# Patient Record
Sex: FEMALE | Race: WHITE | Employment: FULL TIME | URBAN - METROPOLITAN AREA
[De-identification: names, ages, dates, MRNs, and addresses within clinical notes are randomized per-mention and may not be internally consistent; named-entity substitution may affect disease eponyms.]

---

## 2017-01-10 DIAGNOSIS — K21.9 GASTROESOPHAGEAL REFLUX DISEASE, ESOPHAGITIS PRESENCE NOT SPECIFIED: ICD-10-CM

## 2017-01-15 RX ORDER — PANTOPRAZOLE SODIUM 40 MG/1
40 TABLET, DELAYED RELEASE ORAL
Qty: 90 TABLET | Refills: 0 | Status: SHIPPED | OUTPATIENT
Start: 2017-01-15 | End: 2017-01-15

## 2017-01-15 RX ORDER — ROSUVASTATIN CALCIUM 5 MG/1
5 TABLET, COATED ORAL NIGHTLY
Qty: 90 TABLET | Refills: 0 | Status: SHIPPED | OUTPATIENT
Start: 2017-01-15 | End: 2017-01-15

## 2017-01-15 RX ORDER — LEVOTHYROXINE SODIUM 112 UG/1
112 TABLET ORAL
Qty: 90 TABLET | Refills: 0 | Status: SHIPPED | OUTPATIENT
Start: 2017-01-15 | End: 2017-01-15

## 2017-01-16 NOTE — TELEPHONE ENCOUNTER
From: Naresh Plan  To: Payton Peter MD  Sent: 1/10/2017 10:21 PM CST  Subject: Medication Renewal Request    Original authorizing provider: MD Narehs Vega would like a refill of the following medications:  Flutica

## 2017-01-16 NOTE — TELEPHONE ENCOUNTER
Hypothyroid Medications: Medication refilled for 90 days per protocol.     Protocol Criteria:  Appointment scheduled in the past 12 months or the next 3 months  TSH resulted in the past 12 months that is normal  Recent Visits       Provider Department [facility]

## 2017-01-16 NOTE — TELEPHONE ENCOUNTER
Cholesterol Medications/Allergy Medications/GI medications: Medication refilled for 90 days per protocol.     Protocol Criteria:  · Appointment scheduled in the past 12 months or in the next 3 months  · ALT & LDL on file in the past 12 months  · ALT result

## 2017-02-11 ENCOUNTER — HOSPITAL ENCOUNTER (OUTPATIENT)
Age: 57
Discharge: HOME | End: 2017-02-11
Attending: EMERGENCY MEDICINE
Payer: COMMERCIAL

## 2017-02-11 VITALS
SYSTOLIC BLOOD PRESSURE: 124 MMHG | DIASTOLIC BLOOD PRESSURE: 73 MMHG | HEART RATE: 87 BPM | BODY MASS INDEX: 29.99 KG/M2 | TEMPERATURE: 98 F | HEIGHT: 65 IN | RESPIRATION RATE: 18 BRPM | WEIGHT: 180 LBS

## 2017-02-11 DIAGNOSIS — H10.33 ACUTE CONJUNCTIVITIS OF BOTH EYES, UNSPECIFIED ACUTE CONJUNCTIVITIS TYPE: Primary | ICD-10-CM

## 2017-02-11 PROCEDURE — 99204 OFFICE O/P NEW MOD 45 MIN: CPT

## 2017-02-11 PROCEDURE — 99213 OFFICE O/P EST LOW 20 MIN: CPT

## 2017-02-11 RX ORDER — TOBRAMYCIN 3 MG/ML
2 SOLUTION/ DROPS OPHTHALMIC EVERY 6 HOURS
Qty: 1 BOTTLE | Refills: 0 | Status: SHIPPED | OUTPATIENT
Start: 2017-02-11 | End: 2017-02-16

## 2017-02-11 NOTE — ED PROVIDER NOTES
[facility]    History   Patient presents with:   Eye Visual Problem (opthalmic)    Stated Complaint: EYE REDNESS    HPI    The patient is a 58-year-old female with no significant past medical history prese • Cancer Paternal Grandmother      breast   • Lipids Brother          Smoking Status: Never Smoker                      Alcohol Use: No                 Comment:        Review of Systems    Positive for stated complaint: EYE REDNESS  Other systems are as days  If symptoms worsen      Medications Prescribed:  Current Discharge Medication List    START taking these medications    Tobramycin Sulfate 0.3 % Ophthalmic Solution  Place 2 drops into both eyes every 6 (six) hours.   Qty: 1 Bottle Refills: 0

## 2017-03-01 ENCOUNTER — OFFICE VISIT (OUTPATIENT)
Dept: INTERNAL MEDICINE CLINIC | Facility: CLINIC | Age: 57
End: 2017-03-01

## 2017-03-01 VITALS
WEIGHT: 178 LBS | HEIGHT: 64 IN | SYSTOLIC BLOOD PRESSURE: 115 MMHG | BODY MASS INDEX: 30.39 KG/M2 | DIASTOLIC BLOOD PRESSURE: 72 MMHG | TEMPERATURE: 98 F | HEART RATE: 71 BPM

## 2017-03-01 DIAGNOSIS — H04.203 EYE TEARING, BILATERAL: Primary | ICD-10-CM

## 2017-03-01 PROCEDURE — 99212 OFFICE O/P EST SF 10 MIN: CPT | Performed by: INTERNAL MEDICINE

## 2017-03-01 NOTE — PROGRESS NOTES
Nia Sprague is a 60year old female. Patient presents with:  Eye Problem    HPI:   For the past 3-4 days, she has noticed excessive tearing with occasional itchiness in both eyes. No purulent drainage. No redness.   She does not wear contact lenses pink, no conjunctival or scleral redness or injection, and no discharge evident    ASSESSMENT AND PLAN:   1. Eye tearing, bilateral  Doubt conjunctivitis. Reassure and observe. Call if no better.     The patient indicates understanding of these issues and

## 2017-04-05 ENCOUNTER — PATIENT MESSAGE (OUTPATIENT)
Dept: INTERNAL MEDICINE CLINIC | Facility: CLINIC | Age: 57
End: 2017-04-05

## 2017-04-05 DIAGNOSIS — Z00.00 PHYSICAL EXAM: Primary | ICD-10-CM

## 2017-04-05 DIAGNOSIS — Z12.31 SCREENING MAMMOGRAM, ENCOUNTER FOR: ICD-10-CM

## 2017-04-05 NOTE — TELEPHONE ENCOUNTER
From: Terrell Anand  To: Billy Granados MD  Sent: 4/5/2017 11:24 AM CDT  Subject: Other    I have scheduled my yearly regular exam for 4/20/17 to see you. I just want to ask if there is any blood work or testing I should complete before I come.  (

## 2017-04-20 ENCOUNTER — OFFICE VISIT (OUTPATIENT)
Dept: INTERNAL MEDICINE CLINIC | Facility: CLINIC | Age: 57
End: 2017-04-20

## 2017-04-20 VITALS
HEART RATE: 76 BPM | SYSTOLIC BLOOD PRESSURE: 130 MMHG | BODY MASS INDEX: 30.16 KG/M2 | WEIGHT: 181 LBS | HEIGHT: 65 IN | DIASTOLIC BLOOD PRESSURE: 78 MMHG

## 2017-04-20 DIAGNOSIS — E06.3 HYPOTHYROIDISM DUE TO HASHIMOTO'S THYROIDITIS: ICD-10-CM

## 2017-04-20 DIAGNOSIS — E04.9 GOITER: ICD-10-CM

## 2017-04-20 DIAGNOSIS — E78.00 PURE HYPERCHOLESTEROLEMIA: ICD-10-CM

## 2017-04-20 DIAGNOSIS — E03.8 HYPOTHYROIDISM DUE TO HASHIMOTO'S THYROIDITIS: ICD-10-CM

## 2017-04-20 DIAGNOSIS — L98.9 SKIN LESIONS: ICD-10-CM

## 2017-04-20 DIAGNOSIS — K21.9 GASTROESOPHAGEAL REFLUX DISEASE, ESOPHAGITIS PRESENCE NOT SPECIFIED: Primary | ICD-10-CM

## 2017-04-20 DIAGNOSIS — M85.80 OSTEOPENIA, UNSPECIFIED LOCATION: ICD-10-CM

## 2017-04-20 DIAGNOSIS — K21.9 GASTROESOPHAGEAL REFLUX DISEASE WITHOUT ESOPHAGITIS: ICD-10-CM

## 2017-04-20 DIAGNOSIS — N63.25 BREAST LUMP ON LEFT SIDE AT 9 O'CLOCK POSITION: ICD-10-CM

## 2017-04-20 PROCEDURE — 99213 OFFICE O/P EST LOW 20 MIN: CPT | Performed by: INTERNAL MEDICINE

## 2017-04-20 PROCEDURE — 99396 PREV VISIT EST AGE 40-64: CPT | Performed by: INTERNAL MEDICINE

## 2017-04-20 RX ORDER — ROSUVASTATIN CALCIUM 5 MG/1
5 TABLET, COATED ORAL NIGHTLY
Qty: 90 TABLET | Refills: 0 | Status: SHIPPED | OUTPATIENT
Start: 2017-04-20 | End: 2017-04-26

## 2017-04-20 RX ORDER — LEVOTHYROXINE SODIUM 112 UG/1
112 TABLET ORAL
Qty: 90 TABLET | Refills: 0 | Status: SHIPPED | OUTPATIENT
Start: 2017-04-20 | End: 2017-04-26

## 2017-04-20 RX ORDER — PANTOPRAZOLE SODIUM 40 MG/1
40 TABLET, DELAYED RELEASE ORAL
Qty: 90 TABLET | Refills: 0 | Status: SHIPPED | OUTPATIENT
Start: 2017-04-20 | End: 2017-04-26

## 2017-04-20 NOTE — PROGRESS NOTES
HPI:    Patient ID: Keshav Fox is a 60year old female.     HPI Comments: Patient patient presents today for physical exam, states doing well otherwise, denies chest pain, shortness of breath, dyspnea on exertion or heart palpitations also denies na Fluticasone Propionate HFA (FLOVENT HFA) 110 MCG/ACT Inhalation Aerosol Inhale 2 puffs into the lungs 2 (two) times daily.  Disp: 3 Inhaler Rfl: 0   Albuterol Sulfate (2.5 MG/3ML) 0.083% Inhalation Nebu Soln Take 3 mL (2.5 mg total) by nebulization daily as Small  Movable  Under  The  Skin  Cystic  Type  Lesion  - mid  of  Chest   More to   To  Left  Side -to  Breast  Area     Otherwise No Breast mass - bilaterally    Abdominal: Soft. She exhibits no mass. There is no hepatosplenomegaly.  There is no tendernes Dexa  Scan pt  Education   Jae  Vit   D   Bid     Skin lesions  Refer to derm   Mositurizing   Lotions       Physical  Exam   Maintain a healthy low fat and sugar diet  balanced diet   Maintain a regular cardiovascular exercise /walking as tolerated   Comp

## 2017-04-21 ENCOUNTER — TELEPHONE (OUTPATIENT)
Dept: INTERNAL MEDICINE CLINIC | Facility: CLINIC | Age: 57
End: 2017-04-21

## 2017-04-21 DIAGNOSIS — K21.9 GASTROESOPHAGEAL REFLUX DISEASE, ESOPHAGITIS PRESENCE NOT SPECIFIED: Primary | ICD-10-CM

## 2017-04-21 NOTE — TELEPHONE ENCOUNTER
PLEASE SEND TO [facility] PLEASE RE DO SHE IS ALMOST OUT OF MEDICATION    Current Outpatient Prescriptions:  Rosuvastatin Calcium (CRESTOR) 5 MG Oral Tab Take 1 tablet (5 mg total) by mouth nightly.  Disp: 90 tablet Rfl: 0   Pantoprazole Sodium (PRO

## 2017-04-25 ENCOUNTER — PATIENT MESSAGE (OUTPATIENT)
Dept: INTERNAL MEDICINE CLINIC | Facility: CLINIC | Age: 57
End: 2017-04-25

## 2017-04-25 DIAGNOSIS — K21.9 GASTROESOPHAGEAL REFLUX DISEASE, ESOPHAGITIS PRESENCE NOT SPECIFIED: ICD-10-CM

## 2017-04-26 RX ORDER — LEVOTHYROXINE SODIUM 112 UG/1
112 TABLET ORAL
Qty: 90 TABLET | Refills: 1 | Status: SHIPPED | OUTPATIENT
Start: 2017-04-26 | End: 2017-07-17

## 2017-04-26 RX ORDER — ROSUVASTATIN CALCIUM 5 MG/1
5 TABLET, COATED ORAL NIGHTLY
Qty: 90 TABLET | Refills: 1 | Status: SHIPPED | OUTPATIENT
Start: 2017-04-26 | End: 2017-10-26

## 2017-04-26 RX ORDER — PANTOPRAZOLE SODIUM 40 MG/1
40 TABLET, DELAYED RELEASE ORAL
Qty: 90 TABLET | Refills: 1 | Status: SHIPPED | OUTPATIENT
Start: 2017-04-26 | End: 2017-07-17

## 2017-04-26 NOTE — TELEPHONE ENCOUNTER
Pt presented to the [facility] location stating that the meds was sent to the wrong pharmacy. Meds was cancelled at [facility] and sent to [facility]. Understanding was voiced.

## 2017-04-27 NOTE — TELEPHONE ENCOUNTER
From: Mel Garcia  To: Yovany Dominique MD  Sent: 4/25/2017 9:13 PM CDT  Subject: Other    I am upset!!!! I was to see Dr Saad Cooper on 4/20/17. I requested 3 prescription refills to be sent to [facility] immediately as I am almost out.

## 2017-04-28 NOTE — TELEPHONE ENCOUNTER
From: Bharath [place]  To: Laci Arvizu MD  Sent: 4/25/2017 9:21 PM CDT  Subject: Other    This is second message tonight relating to the unsent RX refill.  Please call my cell phone [phone] tomorrow (4/26/17) to let me know if you were able

## 2017-05-01 RX ORDER — PANTOPRAZOLE SODIUM 40 MG/1
40 TABLET, DELAYED RELEASE ORAL
Qty: 90 TABLET | Refills: 0 | Status: CANCELLED | OUTPATIENT
Start: 2017-05-01

## 2017-05-01 RX ORDER — ROSUVASTATIN CALCIUM 5 MG/1
5 TABLET, COATED ORAL NIGHTLY
Qty: 90 TABLET | Refills: 0 | Status: CANCELLED | OUTPATIENT
Start: 2017-05-01 | End: 2018-05-01

## 2017-05-01 RX ORDER — LEVOTHYROXINE SODIUM 112 UG/1
112 TABLET ORAL
Qty: 90 TABLET | Refills: 0 | Status: CANCELLED | OUTPATIENT
Start: 2017-05-01

## 2017-05-01 NOTE — TELEPHONE ENCOUNTER
From: Clara Dooley  To: Josh Vu MD  Sent: 4/25/2017 9:02 PM CDT  Subject: Medication Renewal Request    Original authorizing provider: MD Clara Hearn would like a refill of the following medications:  Rosuvast

## 2017-05-08 ENCOUNTER — HOSPITAL ENCOUNTER (OUTPATIENT)
Dept: ULTRASOUND IMAGING | Age: 57
Discharge: HOME | End: 2017-05-08
Attending: INTERNAL MEDICINE
Payer: COMMERCIAL

## 2017-05-08 ENCOUNTER — HOSPITAL ENCOUNTER (OUTPATIENT)
Dept: MAMMOGRAPHY | Age: 57
Discharge: HOME | End: 2017-05-08
Attending: INTERNAL MEDICINE
Payer: COMMERCIAL

## 2017-05-08 ENCOUNTER — HOSPITAL ENCOUNTER (OUTPATIENT)
Dept: BONE DENSITY | Age: 57
Discharge: HOME | End: 2017-05-08
Attending: INTERNAL MEDICINE
Payer: COMMERCIAL

## 2017-05-08 DIAGNOSIS — M85.80 OSTEOPENIA, UNSPECIFIED LOCATION: ICD-10-CM

## 2017-05-08 DIAGNOSIS — E06.3 HYPOTHYROIDISM DUE TO HASHIMOTO'S THYROIDITIS: ICD-10-CM

## 2017-05-08 DIAGNOSIS — E03.8 HYPOTHYROIDISM DUE TO HASHIMOTO'S THYROIDITIS: ICD-10-CM

## 2017-05-08 DIAGNOSIS — E04.9 GOITER: ICD-10-CM

## 2017-05-08 DIAGNOSIS — Z12.31 SCREENING MAMMOGRAM, ENCOUNTER FOR: ICD-10-CM

## 2017-05-08 PROCEDURE — 76536 US EXAM OF HEAD AND NECK: CPT | Performed by: INTERNAL MEDICINE

## 2017-05-08 PROCEDURE — 77067 SCR MAMMO BI INCL CAD: CPT | Performed by: INTERNAL MEDICINE

## 2017-05-08 PROCEDURE — 77080 DXA BONE DENSITY AXIAL: CPT | Performed by: INTERNAL MEDICINE

## 2017-05-15 ENCOUNTER — OFFICE VISIT (OUTPATIENT)
Dept: DERMATOLOGY CLINIC | Facility: CLINIC | Age: 57
End: 2017-05-15

## 2017-05-15 DIAGNOSIS — D23.5 BENIGN NEOPLASM OF SKIN OF TRUNK, EXCEPT SCROTUM: ICD-10-CM

## 2017-05-15 DIAGNOSIS — D23.60 BENIGN NEOPLASM OF SKIN OF UPPER LIMB, INCLUDING SHOULDER, UNSPECIFIED LATERALITY: ICD-10-CM

## 2017-05-15 DIAGNOSIS — D23.30 BENIGN NEOPLASM OF SKIN OF FACE: ICD-10-CM

## 2017-05-15 DIAGNOSIS — D23.4 BENIGN NEOPLASM OF SCALP AND SKIN OF NECK: ICD-10-CM

## 2017-05-15 DIAGNOSIS — D22.9 MULTIPLE NEVI: Primary | ICD-10-CM

## 2017-05-15 PROCEDURE — 99213 OFFICE O/P EST LOW 20 MIN: CPT | Performed by: DERMATOLOGY

## 2017-05-15 PROCEDURE — 99212 OFFICE O/P EST SF 10 MIN: CPT | Performed by: DERMATOLOGY

## 2017-05-20 ENCOUNTER — LAB ENCOUNTER (OUTPATIENT)
Dept: LAB | Facility: HOSPITAL | Age: 57
End: 2017-05-20
Attending: INTERNAL MEDICINE
Payer: COMMERCIAL

## 2017-05-20 DIAGNOSIS — Z12.31 SCREENING MAMMOGRAM, ENCOUNTER FOR: ICD-10-CM

## 2017-05-20 DIAGNOSIS — Z00.00 PHYSICAL EXAM: ICD-10-CM

## 2017-05-20 PROCEDURE — 85025 COMPLETE CBC W/AUTO DIFF WBC: CPT

## 2017-05-20 PROCEDURE — 36415 COLL VENOUS BLD VENIPUNCTURE: CPT

## 2017-05-20 PROCEDURE — 80053 COMPREHEN METABOLIC PANEL: CPT

## 2017-05-20 PROCEDURE — 81015 MICROSCOPIC EXAM OF URINE: CPT

## 2017-05-20 PROCEDURE — 80061 LIPID PANEL: CPT

## 2017-05-20 PROCEDURE — 84443 ASSAY THYROID STIM HORMONE: CPT

## 2017-05-22 NOTE — PROGRESS NOTES
Quick Note:    Please call patient with blood test results. Kidney and liver function are normal, no anemia. Cholesterol is MILDLY ELEVATED   sugar is MILDLY ELEVATED   Thyroid hormone is normal as well. Urine is clear.      ADVISE TO CONTINUE SAME M

## 2017-05-30 NOTE — PROGRESS NOTES
Becca Farooq is a 60year old female. HPI:     CC:  Patient presents with:  Full Skin Exam: LOV 5/22/2014 with Dr. Lorena Baxter. Pt presenting for full body skin exam. Pt denies personal and family hx of skin cancer.         Allergies:  Review of patient No Known Allergies    Past Medical History   Diagnosis Date   • Hypercholesterolemia    • Hypothyroid    • Mitral valve prolapse    • Lump, breast          Past Surgical History          Comment x2    COLONOSCOPY      T&A      OTHER SURGICAL HISTORY Examination:     Well-developed well-nourished patient alert oriented in no acute distress.   Exam total-body performed, including scalp, head, neck, face,nails, hair, external eyes, including conjunctival mucosa, eyelids, lips external ears, back, chest,/ seborrheic  keratoses, cherry angiomas:  Reassurance regarding other benign skin lesions. Signs and symptoms of skin cancer, ABCDE's of melanoma discussed with patient. Sunscreen use, sun protection, self exams reviewed.   Followup as noted RTC routine check

## 2017-06-01 ENCOUNTER — HOSPITAL ENCOUNTER (OUTPATIENT)
Dept: CT IMAGING | Age: 57
Discharge: HOME | End: 2017-06-01
Attending: INTERNAL MEDICINE
Payer: COMMERCIAL

## 2017-06-01 DIAGNOSIS — R22.1 NECK MASS: ICD-10-CM

## 2017-06-01 PROCEDURE — 70492 CT SFT TSUE NCK W/O & W/DYE: CPT | Performed by: INTERNAL MEDICINE

## 2017-06-01 PROCEDURE — 82565 ASSAY OF CREATININE: CPT

## 2017-06-12 ENCOUNTER — TELEPHONE (OUTPATIENT)
Dept: INTERNAL MEDICINE CLINIC | Facility: CLINIC | Age: 57
End: 2017-06-12

## 2017-06-12 NOTE — TELEPHONE ENCOUNTER
Dr. Krzysztof Blair called to advised PCP they will not see the patient at this time  Pt must see ENT Dr Chencho Mcqueen [place] [phone] if the her Pathology Report comes back positive then they will be able to see   Quyen Fletcher will hang on to her chart and see what ha

## 2017-06-13 NOTE — TELEPHONE ENCOUNTER
Patient was called and informed. She stated she understands and appointment was scheduled. She stated she keep the appointment.

## 2017-06-13 NOTE — TELEPHONE ENCOUNTER
Noted  ,Patient    Needs  To   See  ENT  - DR Parth Salazar  First  And than  possibly  DR Kera Phillips  If needed  Per Dr Kera Phillips  Recommendation  .     Please  Call   Patient  To  Inform about  It  -  She  Has apt   With Dr Parth Salazar soon - 6/20

## 2017-06-20 ENCOUNTER — OFFICE VISIT (OUTPATIENT)
Dept: OTOLARYNGOLOGY | Facility: CLINIC | Age: 57
End: 2017-06-20

## 2017-06-20 VITALS
HEART RATE: 76 BPM | DIASTOLIC BLOOD PRESSURE: 77 MMHG | BODY MASS INDEX: 30.16 KG/M2 | HEIGHT: 65 IN | WEIGHT: 181 LBS | TEMPERATURE: 98 F | SYSTOLIC BLOOD PRESSURE: 126 MMHG

## 2017-06-20 DIAGNOSIS — R59.1 LYMPHADENOPATHY OF HEAD AND NECK: ICD-10-CM

## 2017-06-20 DIAGNOSIS — J39.2 LESION OF THROAT: Primary | ICD-10-CM

## 2017-06-20 PROCEDURE — 99243 OFF/OP CNSLTJ NEW/EST LOW 30: CPT | Performed by: OTOLARYNGOLOGY

## 2017-06-20 PROCEDURE — 31575 DIAGNOSTIC LARYNGOSCOPY: CPT | Performed by: OTOLARYNGOLOGY

## 2017-06-20 NOTE — PROGRESS NOTES
Melisa Barragan is a 60year old female.   Patient presents with:  Lymph Node: Patient is here today to discuss CT results      HISTORY OF PRESENT ILLNESS    She presents with a history of US of the thyroid for presumed thyroid enlargement with normal th Negative Cold intolerance and heat intolerance. Neuro Negative Tremors. Psych Negative Anxiety and depression. Integumentary Negative Frequent skin infections, pigment change and rash. Hema/Lymph Negative Easy bleeding and easy bruising. flexible fiberoptic laryngoscopy was performed. The flexible fiberoptic laryngoscope was placed into the nose or mouthand advanced  into the interior of the larynx. A thorough examination of the interior of the larynx was performed.    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 I have recommended repeat laryngoscopy in 3 months and repeat US of the thyroid region to reevaluate this node in 6 months. She agrees with this plan. Nettie Singh.  Georgette Aguilar MD    6/20/2017    3:49 PM

## 2017-06-21 ENCOUNTER — TELEPHONE (OUTPATIENT)
Dept: INTERNAL MEDICINE CLINIC | Facility: CLINIC | Age: 57
End: 2017-06-21

## 2017-06-21 NOTE — TELEPHONE ENCOUNTER
Noted  ,  Patient  Has  Work up  In  Future  With   Dr Billy Smiley  -   Patient agreed  With  Plan  .   Please  Call pt   To inform   And  Let  Her  Know   Patient to F/u  With me in  3  Months  If any worsening  sy  Growth of lump  Or pain  - f/u sooner    [facility]

## 2017-06-21 NOTE — TELEPHONE ENCOUNTER
Espinoza Ontiveros RN from Dr. [facility] office called in just wanting to touch base with Dr. Jaspal Garvin regarding pt. Espinoza Ontiveros states pt saw Dr. Hyacinth Carver yesterday and he does not feel there is a need for a biopsy.   Espinoza Ontiveros states Dr. Jaspal Garvin can look back into Dr. Venu Packer notes from [facility]este

## 2017-07-11 ENCOUNTER — TELEPHONE (OUTPATIENT)
Dept: INTERNAL MEDICINE CLINIC | Facility: CLINIC | Age: 57
End: 2017-07-11

## 2017-07-11 DIAGNOSIS — K21.9 GASTROESOPHAGEAL REFLUX DISEASE, ESOPHAGITIS PRESENCE NOT SPECIFIED: ICD-10-CM

## 2017-07-11 NOTE — TELEPHONE ENCOUNTER
Pt calling and stts that she is having a mix up with pharmacy and needs scripts sent over to her new pharmacy.      Current Outpatient Prescriptions:  Levothyroxine Sodium 112 MCG Oral Tab Take 1 tablet (112 mcg total) by mouth every morning before breakfas

## 2017-07-17 RX ORDER — PANTOPRAZOLE SODIUM 40 MG/1
40 TABLET, DELAYED RELEASE ORAL
Qty: 90 TABLET | Refills: 0 | Status: SHIPPED | OUTPATIENT
Start: 2017-07-17 | End: 2017-10-26

## 2017-07-17 RX ORDER — LEVOTHYROXINE SODIUM 112 UG/1
112 TABLET ORAL
Qty: 90 TABLET | Refills: 0 | Status: SHIPPED | OUTPATIENT
Start: 2017-07-17 | End: 2017-10-26

## 2017-07-17 NOTE — TELEPHONE ENCOUNTER
Hypothyroid Medications  Protocol Criteria:  Appointment scheduled in the past 12 months or the next 3 months  TSH resulted in the past 12 months that is normal  Recent Outpatient Visits            3 weeks ago Lesion of throat    [facility] for presence not specified    Theodore Hughes MD    Office Visit        Future Appointments       Provider Department Appt Notes    In 1 month Tony Reed MD [facility] New Consult  dx: recurr

## 2017-08-30 ENCOUNTER — OFFICE VISIT (OUTPATIENT)
Dept: SURGERY | Facility: CLINIC | Age: 57
End: 2017-08-30

## 2017-08-30 VITALS
HEIGHT: 64 IN | RESPIRATION RATE: 18 BRPM | OXYGEN SATURATION: 95 % | SYSTOLIC BLOOD PRESSURE: 135 MMHG | HEART RATE: 77 BPM | BODY MASS INDEX: 31.07 KG/M2 | DIASTOLIC BLOOD PRESSURE: 68 MMHG | WEIGHT: 182 LBS

## 2017-08-30 DIAGNOSIS — D17.1 LIPOMA OF CHEST WALL: Primary | ICD-10-CM

## 2017-08-30 PROCEDURE — 99244 OFF/OP CNSLTJ NEW/EST MOD 40: CPT | Performed by: SURGERY

## 2017-10-26 ENCOUNTER — OFFICE VISIT (OUTPATIENT)
Dept: INTERNAL MEDICINE CLINIC | Facility: CLINIC | Age: 57
End: 2017-10-26

## 2017-10-26 VITALS
RESPIRATION RATE: 20 BRPM | BODY MASS INDEX: 30.9 KG/M2 | HEIGHT: 64 IN | WEIGHT: 181 LBS | TEMPERATURE: 98 F | SYSTOLIC BLOOD PRESSURE: 120 MMHG | HEART RATE: 80 BPM | DIASTOLIC BLOOD PRESSURE: 75 MMHG

## 2017-10-26 DIAGNOSIS — E03.8 HYPOTHYROIDISM DUE TO HASHIMOTO'S THYROIDITIS: ICD-10-CM

## 2017-10-26 DIAGNOSIS — E78.00 PURE HYPERCHOLESTEROLEMIA: ICD-10-CM

## 2017-10-26 DIAGNOSIS — Z23 ENCOUNTER FOR VACCINATION: ICD-10-CM

## 2017-10-26 DIAGNOSIS — K21.9 GASTROESOPHAGEAL REFLUX DISEASE, ESOPHAGITIS PRESENCE NOT SPECIFIED: ICD-10-CM

## 2017-10-26 DIAGNOSIS — E06.3 HYPOTHYROIDISM DUE TO HASHIMOTO'S THYROIDITIS: ICD-10-CM

## 2017-10-26 DIAGNOSIS — R59.0 ENLARGED LYMPH NODE IN NECK: Primary | ICD-10-CM

## 2017-10-26 PROCEDURE — 90471 IMMUNIZATION ADMIN: CPT | Performed by: INTERNAL MEDICINE

## 2017-10-26 PROCEDURE — 99214 OFFICE O/P EST MOD 30 MIN: CPT | Performed by: INTERNAL MEDICINE

## 2017-10-26 PROCEDURE — 90686 IIV4 VACC NO PRSV 0.5 ML IM: CPT | Performed by: INTERNAL MEDICINE

## 2017-10-26 RX ORDER — LEVOTHYROXINE SODIUM 112 UG/1
112 TABLET ORAL
Qty: 90 TABLET | Refills: 3 | Status: SHIPPED | OUTPATIENT
Start: 2017-10-26 | End: 2018-02-06

## 2017-10-26 RX ORDER — ROSUVASTATIN CALCIUM 5 MG/1
5 TABLET, COATED ORAL NIGHTLY
Qty: 90 TABLET | Refills: 3 | Status: SHIPPED | OUTPATIENT
Start: 2017-10-26 | End: 2018-05-19

## 2017-10-26 RX ORDER — PANTOPRAZOLE SODIUM 40 MG/1
40 TABLET, DELAYED RELEASE ORAL
Qty: 90 TABLET | Refills: 3 | Status: SHIPPED | OUTPATIENT
Start: 2017-10-26 | End: 2018-05-19

## 2017-10-26 NOTE — PROGRESS NOTES
HPI:    Patient ID: Teresa Zheng is a 59year old female. Thyroid Problem   This is a chronic (pt state doing well  denies ant complains -) problem. The current episode started more than 1 year ago. The problem has been gradually improving.  Pertin and time. She appears well-developed and well-nourished. No distress. HENT:   Head: Normocephalic and atraumatic.    Right Ear: Tympanic membrane, external ear and ear canal normal.   Left Ear: Tympanic membrane, external ear and ear canal normal.   Nose: node in neck  (primary encounter diagnosis)  Us thyroid neck recheck ly node  Than to see Dr Walker Doty   Need repeat laryngoscopy pt will Make apt  -ENT    Pure hypercholesterolemia  · Advice low fat  diet , lean meat turkey and chicken breast , fish in diet ,

## 2017-11-07 ENCOUNTER — OFFICE VISIT (OUTPATIENT)
Dept: OTOLARYNGOLOGY | Facility: CLINIC | Age: 57
End: 2017-11-07

## 2017-11-07 VITALS
HEIGHT: 64 IN | TEMPERATURE: 98 F | DIASTOLIC BLOOD PRESSURE: 79 MMHG | SYSTOLIC BLOOD PRESSURE: 146 MMHG | BODY MASS INDEX: 30.73 KG/M2 | WEIGHT: 180 LBS

## 2017-11-07 DIAGNOSIS — R59.1 LYMPHADENOPATHY OF HEAD AND NECK: Primary | ICD-10-CM

## 2017-11-07 PROCEDURE — 31575 DIAGNOSTIC LARYNGOSCOPY: CPT | Performed by: OTOLARYNGOLOGY

## 2017-11-07 PROCEDURE — 99214 OFFICE O/P EST MOD 30 MIN: CPT | Performed by: OTOLARYNGOLOGY

## 2017-11-07 PROCEDURE — 99212 OFFICE O/P EST SF 10 MIN: CPT | Performed by: OTOLARYNGOLOGY

## 2017-11-07 NOTE — PROGRESS NOTES
Ponce Florence is a 59year old female. Patient presents with:   Follow - Up: regarding lesion of throat, pt is doing well       HISTORY OF PRESENT ILLNESS    She presents with a history of US of the thyroid for presumed thyroid enlargement with normal breast    • Mitral valve prolapse        Past Surgical History:  No date: ADENOIDECTOMY  No date: COLONOSCOPY  2013: ENDOMETRIAL BIOPSY - JAR(S): 2  1997: LUMPECTOMY LEFT      Comment: negative  No date:       Comment: x2  No date: OTHER SURGICAL HISTO Right: Normal, Left: Normal.   Skin Normal Inspection - Normal.        Lymph Detail Normal Submental. Submandibular. Anterior cervical. Posterior cervical. Supraclavicular.         Nose/Mouth/Throat Normal External nose - Normal. Lips/teeth/gums - Normal. T 90 tablet, Rfl: 3  •  Levothyroxine Sodium 112 MCG Oral Tab, Take 1 tablet (112 mcg total) by mouth every morning before breakfast., Disp: 90 tablet, Rfl: 3  •  Fluticasone Propionate HFA (FLOVENT HFA) 110 MCG/ACT Inhalation Aerosol, Inhale 2 puffs into th

## 2017-11-28 ENCOUNTER — HOSPITAL ENCOUNTER (OUTPATIENT)
Dept: ULTRASOUND IMAGING | Age: 57
Discharge: HOME | End: 2017-11-28
Attending: OTOLARYNGOLOGY
Payer: COMMERCIAL

## 2017-11-28 DIAGNOSIS — R59.1 LYMPHADENOPATHY OF HEAD AND NECK: ICD-10-CM

## 2017-11-28 PROCEDURE — 76536 US EXAM OF HEAD AND NECK: CPT | Performed by: OTOLARYNGOLOGY

## 2017-11-30 ENCOUNTER — TELEPHONE (OUTPATIENT)
Dept: OTOLARYNGOLOGY | Facility: CLINIC | Age: 57
End: 2017-11-30

## 2017-11-30 DIAGNOSIS — R59.1 GENERALIZED ENLARGED LYMPH NODES: Primary | ICD-10-CM

## 2017-12-01 NOTE — TELEPHONE ENCOUNTER
Pt informed of results and recommendation for US FNA. Pt verbalized understanding. Order entered and faxed to radiology dept, [phone]; confirmation rec'd.

## 2017-12-13 ENCOUNTER — TELEPHONE (OUTPATIENT)
Dept: OTOLARYNGOLOGY | Facility: CLINIC | Age: 57
End: 2017-12-13

## 2017-12-13 NOTE — TELEPHONE ENCOUNTER
Informed pt radiology department does have her order on file and she will be called to schedule US FNA. Pt verbalized understanding.

## 2017-12-13 NOTE — TELEPHONE ENCOUNTER
Patient calling confused on how to scheduled her US FNA. States was told by the RN not to make the appt until she was called. States its been 2 weeks since the US was ordered and no one has called her.  Wants to make sure she doesn't have to call to schedul

## 2017-12-28 PROBLEM — D17.1 LIPOMA OF CHEST WALL: Status: ACTIVE | Noted: 2017-12-28

## 2017-12-29 NOTE — PROGRESS NOTES
Breast Surgery New Patient Consultation    This is the first visit for this 59year old woman, referred by Dr. Dandre Dela Cruz, who presents for evaluation of Left breast mass.     History of Present Illness:   Ms. Armando Cisneros is a 59year old woman who infertility treatment to achieve pregnancy.     Medications:      [DISCONTINUED] Levothyroxine Sodium 112 MCG Oral Tab Take 1 tablet (112 mcg total) by mouth every morning before breakfast. Disp: 90 tablet Rfl: 0   [DISCONTINUED] Pantoprazole Sodium (PROTON SOB/Coughing at night, swelling of the legs or chest pain while walking.     Breasts:  See history of present illness    Gastrointestinal:     There is no history of difficulty or pain with swallowing, +reflux symptoms, vomiting, dark or bloody stools, cons Her speech patterns and movements are normal. Her affect is appropriate. HEENT: The head is normocephalic. The neck is supple. The thyroid is not enlarged and is without palpable masses/nodules. There are no palpable masses.  The trachea is in the midlin exam today I found the appearance of a clinically apparent lipoma in her area of concern. I reassured the patient that this is not within her breast gland and that therefore I am not suspicious of a primary breast carcinoma.   I did explain that lipomas ar

## 2018-01-03 ENCOUNTER — OFFICE VISIT (OUTPATIENT)
Dept: SURGERY | Facility: CLINIC | Age: 58
End: 2018-01-03

## 2018-01-03 ENCOUNTER — HOSPITAL ENCOUNTER (OUTPATIENT)
Facility: HOSPITAL | Age: 58
Discharge: HOME | End: 2018-01-03
Attending: SURGERY
Payer: COMMERCIAL

## 2018-01-03 VITALS
DIASTOLIC BLOOD PRESSURE: 60 MMHG | HEART RATE: 76 BPM | HEIGHT: 64 IN | WEIGHT: 180 LBS | BODY MASS INDEX: 30.73 KG/M2 | OXYGEN SATURATION: 97 % | SYSTOLIC BLOOD PRESSURE: 136 MMHG | RESPIRATION RATE: 18 BRPM

## 2018-01-03 DIAGNOSIS — D17.1 LIPOMA OF CHEST WALL: Primary | ICD-10-CM

## 2018-01-03 DIAGNOSIS — Z12.39 SCREENING FOR BREAST CANCER: ICD-10-CM

## 2018-01-03 PROCEDURE — 99214 OFFICE O/P EST MOD 30 MIN: CPT | Performed by: SURGERY

## 2018-01-03 PROCEDURE — 99211 OFF/OP EST MAY X REQ PHY/QHP: CPT | Performed by: SURGERY

## 2018-01-04 ENCOUNTER — LAB ENCOUNTER (OUTPATIENT)
Dept: LAB | Facility: HOSPITAL | Age: 58
End: 2018-01-04
Attending: INTERNAL MEDICINE
Payer: COMMERCIAL

## 2018-01-04 DIAGNOSIS — E78.00 PURE HYPERCHOLESTEROLEMIA: ICD-10-CM

## 2018-01-04 LAB
ALBUMIN SERPL BCP-MCNC: 4 G/DL (ref 3.5–4.8)
ALBUMIN/GLOB SERPL: 1.3 {RATIO} (ref 1–2)
ALP SERPL-CCNC: 37 U/L (ref 32–100)
ALT SERPL-CCNC: 24 U/L (ref 14–54)
ANION GAP SERPL CALC-SCNC: 8 MMOL/L (ref 0–18)
AST SERPL-CCNC: 22 U/L (ref 15–41)
BACTERIA UR QL AUTO: NEGATIVE /HPF
BASOPHILS # BLD: 0 K/UL (ref 0–0.2)
BASOPHILS NFR BLD: 1 %
BILIRUB SERPL-MCNC: 1.3 MG/DL (ref 0.3–1.2)
BUN SERPL-MCNC: 13 MG/DL (ref 8–20)
BUN/CREAT SERPL: 13.8 (ref 10–20)
CALCIUM SERPL-MCNC: 9.5 MG/DL (ref 8.5–10.5)
CHLORIDE SERPL-SCNC: 103 MMOL/L (ref 95–110)
CHOLEST SERPL-MCNC: 170 MG/DL (ref 110–200)
CO2 SERPL-SCNC: 27 MMOL/L (ref 22–32)
CREAT SERPL-MCNC: 0.94 MG/DL (ref 0.5–1.5)
EOSINOPHIL # BLD: 0.1 K/UL (ref 0–0.7)
EOSINOPHIL NFR BLD: 1 %
ERYTHROCYTE [DISTWIDTH] IN BLOOD BY AUTOMATED COUNT: 12.6 % (ref 11–15)
GLOBULIN PLAS-MCNC: 3.2 G/DL (ref 2.5–3.7)
GLUCOSE SERPL-MCNC: 122 MG/DL (ref 70–99)
HCT VFR BLD AUTO: 41.3 % (ref 35–48)
HDLC SERPL-MCNC: 53 MG/DL
HGB BLD-MCNC: 13.8 G/DL (ref 12–16)
HYALINE CASTS #/AREA URNS AUTO: 1 /LPF
LDLC SERPL CALC-MCNC: 100 MG/DL (ref 0–99)
LYMPHOCYTES # BLD: 2.3 K/UL (ref 1–4)
LYMPHOCYTES NFR BLD: 41 %
MCH RBC QN AUTO: 30.4 PG (ref 27–32)
MCHC RBC AUTO-ENTMCNC: 33.4 G/DL (ref 32–37)
MCV RBC AUTO: 91.1 FL (ref 80–100)
MONOCYTES # BLD: 0.4 K/UL (ref 0–1)
MONOCYTES NFR BLD: 6 %
NEUTROPHILS # BLD AUTO: 2.9 K/UL (ref 1.8–7.7)
NEUTROPHILS NFR BLD: 51 %
NONHDLC SERPL-MCNC: 117 MG/DL
OSMOLALITY UR CALC.SUM OF ELEC: 287 MOSM/KG (ref 275–295)
PLATELET # BLD AUTO: 286 K/UL (ref 140–400)
PMV BLD AUTO: 7 FL (ref 7.4–10.3)
POTASSIUM SERPL-SCNC: 4 MMOL/L (ref 3.3–5.1)
PROT SERPL-MCNC: 7.2 G/DL (ref 5.9–8.4)
RBC # BLD AUTO: 4.53 M/UL (ref 3.7–5.4)
RBC #/AREA URNS AUTO: <1 /HPF
SODIUM SERPL-SCNC: 138 MMOL/L (ref 136–144)
T3 SERPL-MCNC: 1.03 NG/ML (ref 0.87–1.78)
T4 FREE SERPL-MCNC: 1.35 NG/DL (ref 0.58–1.64)
TRIGL SERPL-MCNC: 85 MG/DL (ref 1–149)
TSH SERPL-ACNC: 0.2 UIU/ML (ref 0.45–5.33)
WBC # BLD AUTO: 5.7 K/UL (ref 4–11)
WBC #/AREA URNS AUTO: <1 /HPF

## 2018-01-04 PROCEDURE — 81015 MICROSCOPIC EXAM OF URINE: CPT

## 2018-01-04 PROCEDURE — 84439 ASSAY OF FREE THYROXINE: CPT

## 2018-01-04 PROCEDURE — 84480 ASSAY TRIIODOTHYRONINE (T3): CPT

## 2018-01-04 PROCEDURE — 84443 ASSAY THYROID STIM HORMONE: CPT

## 2018-01-04 PROCEDURE — 80061 LIPID PANEL: CPT

## 2018-01-04 PROCEDURE — 36415 COLL VENOUS BLD VENIPUNCTURE: CPT

## 2018-01-04 PROCEDURE — 85025 COMPLETE CBC W/AUTO DIFF WBC: CPT

## 2018-01-04 PROCEDURE — 80053 COMPREHEN METABOLIC PANEL: CPT

## 2018-01-04 NOTE — PROGRESS NOTES
Breast Surgery Surveillance      History of Present Illness:   Ms. Aliya Khan is a 59year old woman who presented with concerns of a periodic palpable mass in the left medial chest wall and was last seen in August 2017.   She reports that she has n contraceptive use for 1 years, last in 1978. She denies infertility treatment to achieve pregnancy. Medications:      Rosuvastatin Calcium (CRESTOR) 5 MG Oral Tab Take 1 tablet (5 mg total) by mouth nightly.  Disp: 90 tablet Rfl: 3   Pantoprazole Sodium flat, SOB/Coughing at night, swelling of the legs or chest pain while walking.     Breasts:  See history of present illness    Gastrointestinal:     There is no history of difficulty or pain with swallowing, +reflux symptoms, vomiting, dark or bloody stools age. Her speech patterns and movements are normal. Her affect is appropriate. HEENT: The head is normocephalic. The neck is supple. The thyroid is not enlarged and is without palpable masses/nodules. There are no palpable masses.  The trachea is in the m exam. On exam today I found the appearance of a clinically apparent lipoma in her area of concern that has overall not changed since her last visit.   I reassured the patient that this is not within her breast gland and that therefore I am not suspicious of

## 2018-01-05 ENCOUNTER — HOSPITAL ENCOUNTER (OUTPATIENT)
Dept: ULTRASOUND IMAGING | Facility: HOSPITAL | Age: 58
Discharge: HOME | End: 2018-01-05
Attending: OTOLARYNGOLOGY
Payer: COMMERCIAL

## 2018-01-05 VITALS
SYSTOLIC BLOOD PRESSURE: 153 MMHG | HEART RATE: 82 BPM | DIASTOLIC BLOOD PRESSURE: 79 MMHG | RESPIRATION RATE: 16 BRPM | OXYGEN SATURATION: 97 %

## 2018-01-05 DIAGNOSIS — R59.1 GENERALIZED ENLARGED LYMPH NODES: ICD-10-CM

## 2018-01-05 LAB
CD10 CELLS NFR SPEC: 1 %
CD11C CELLS NFR SPEC: 2 %
CD14 CELLS NFR SPEC: 2 %
CD19 CELLS NFR SPEC: 18 %
CD19/CD10 CELLS: 1 %
CD20 CELLS NFR SPEC: 18 %
CD22 CELLS NFR SPEC: 18 %
CD23 CELLS NFR SPEC: 10 %
CD3 CELLS NFR SPEC: 82 %
CD3+CD4+ CELLS NFR SPEC: 75 %
CD3+CD4+ CELLS/CD3+CD8+ CLL SPEC: 10.7
CD3+CD8+ CELLS NFR SPEC: 7 %
CD45 CELLS NFR SPEC: 100 %
CD5 CELLS NFR SPEC: 84 %
CD5/CD19 CELLS: 2 %
CD56 CELLS NFR SPEC: <1 %
CD7 CELLS NFR SPEC: 77 %
CELL SURF KAPPA/LAMBDA RATIO: 1.3
CELL SURF LAMBDA LIGHT CHAIN: 7 %
CELL SURFACE KAPPA LIGHT CHAIN: 9 %
FMC7 CELLS NFR SPEC: 15 %

## 2018-01-05 PROCEDURE — 10022 US FNA LYMPH NODE, RIGHT (CPT=76942,10022): CPT | Performed by: OTOLARYNGOLOGY

## 2018-01-05 PROCEDURE — 88185 FLOWCYTOMETRY/TC ADD-ON: CPT | Performed by: OTOLARYNGOLOGY

## 2018-01-05 PROCEDURE — 76942 ECHO GUIDE FOR BIOPSY: CPT | Performed by: OTOLARYNGOLOGY

## 2018-01-05 PROCEDURE — 88172 CYTP DX EVAL FNA 1ST EA SITE: CPT | Performed by: OTOLARYNGOLOGY

## 2018-01-05 PROCEDURE — 88184 FLOWCYTOMETRY/ TC 1 MARKER: CPT | Performed by: OTOLARYNGOLOGY

## 2018-01-05 PROCEDURE — 88189 FLOWCYTOMETRY/READ 16 & >: CPT | Performed by: OTOLARYNGOLOGY

## 2018-01-05 PROCEDURE — 88173 CYTOPATH EVAL FNA REPORT: CPT | Performed by: OTOLARYNGOLOGY

## 2018-01-05 NOTE — IMAGING NOTE
PT ARRIVED TO ROOM 4   SCANS BY CLAUDIA RAZO US TECH    HX TAKEN PROCEDURE EXPLAINED QUESTIONS ANSWERED  HX OF HYPOTHYROIDISM.  LOCALIZED SWELLING, LUMP RIGHT SIDE OF NECK  PLAN IS FNA X 2 SUBMANDIBULAR LYMPH NODES ON THE RIGHT    PT CONSENTED AT 1

## 2018-01-08 ENCOUNTER — TELEPHONE (OUTPATIENT)
Dept: OTOLARYNGOLOGY | Facility: CLINIC | Age: 58
End: 2018-01-08

## 2018-01-08 NOTE — PROGRESS NOTES
Please call patient with blood test results. Kidney and liver function are normal, no anemia.    Cholesterol is normal   sugar is elevated - advice to decrease  Sugars and carbs in  Diet   Thyroid hormone mildly elevated - will  Decrease   Dosage to 100

## 2018-01-18 ENCOUNTER — OFFICE VISIT (OUTPATIENT)
Dept: OTOLARYNGOLOGY | Facility: CLINIC | Age: 58
End: 2018-01-18

## 2018-01-18 VITALS
BODY MASS INDEX: 30.73 KG/M2 | HEIGHT: 64 IN | DIASTOLIC BLOOD PRESSURE: 82 MMHG | TEMPERATURE: 98 F | WEIGHT: 180 LBS | SYSTOLIC BLOOD PRESSURE: 133 MMHG

## 2018-01-18 DIAGNOSIS — R59.1 LYMPHADENOPATHY OF HEAD AND NECK: Primary | ICD-10-CM

## 2018-01-18 PROCEDURE — 99212 OFFICE O/P EST SF 10 MIN: CPT | Performed by: OTOLARYNGOLOGY

## 2018-01-18 PROCEDURE — 99214 OFFICE O/P EST MOD 30 MIN: CPT | Performed by: OTOLARYNGOLOGY

## 2018-01-18 NOTE — PROGRESS NOTES
Miller Stahl is a 59year old female. Patient presents with:   Follow - Up: US FNA lymph node result done on 1/5/18      HISTORY OF PRESENT ILLNESS  She presents with a history of US of the thyroid for presumed thyroid enlargement with normal thyroid • Cancer Mother      cervical   • Cancer Paternal Grandmother      breast   • Breast Cancer Paternal Grandmother 48   • Lipids Brother        Past Medical History:   Diagnosis Date   • Hypercholesterolemia    • Hypothyroid    • Hypothyroidism    • Lump, External nose - Normal. Lips/teeth/gums - Normal. Tonsils - Normal. Oropharynx - Normal.   Ears Normal Inspection - Right: Normal, Left: Normal. Canal - Right: Normal, Left: Normal. TM - Right: Normal, Left: Normal.   Skin Normal Inspection - Normal.

## 2018-02-06 ENCOUNTER — OFFICE VISIT (OUTPATIENT)
Dept: OBGYN CLINIC | Facility: CLINIC | Age: 58
End: 2018-02-06

## 2018-02-06 VITALS
DIASTOLIC BLOOD PRESSURE: 74 MMHG | HEIGHT: 64 IN | SYSTOLIC BLOOD PRESSURE: 137 MMHG | HEART RATE: 82 BPM | BODY MASS INDEX: 30.25 KG/M2 | WEIGHT: 177.19 LBS

## 2018-02-06 DIAGNOSIS — Z01.419 ENCOUNTER FOR GYNECOLOGICAL EXAMINATION: ICD-10-CM

## 2018-02-06 DIAGNOSIS — Z12.4 SCREENING FOR MALIGNANT NEOPLASM OF CERVIX: Primary | ICD-10-CM

## 2018-02-06 PROCEDURE — 99386 PREV VISIT NEW AGE 40-64: CPT | Performed by: OBSTETRICS & GYNECOLOGY

## 2018-02-07 LAB — HPV I/H RISK 1 DNA SPEC QL NAA+PROBE: NEGATIVE

## 2018-02-07 NOTE — PROGRESS NOTES
Clara Dooley is a 59year old female [id number] Patient's last menstrual period was 12/01/2016. here for annual exam.       Seen [facility] in 2013. LMP 12/2016. Sees Dr Estrella Cárdenas. Pt has stable small lipoma on medial left breast.  Last mammogram 5/2017. Fluticasone Propionate HFA (FLOVENT HFA) 110 MCG/ACT Inhalation Aerosol Inhale 2 puffs into the lungs 2 (two) times daily.  Disp: 3 Inhaler Rfl: 3       ALLERGIES:  No Known Allergies      Review of Systems:  Constitutional:  Denies fatigue, night sweats, contour, position, mobility, without tenderness  Adnexa: normal without masses or tenderness  Perineum/anus: normal      Assessment & Plan:    Chloe Barclay is a 59year old female who presents for an annual physical exam.    1. Encounter for gynecolo

## 2018-02-24 ENCOUNTER — PATIENT MESSAGE (OUTPATIENT)
Dept: INTERNAL MEDICINE CLINIC | Facility: CLINIC | Age: 58
End: 2018-02-24

## 2018-02-24 ENCOUNTER — TELEPHONE (OUTPATIENT)
Dept: INTERNAL MEDICINE CLINIC | Facility: CLINIC | Age: 58
End: 2018-02-24

## 2018-02-24 DIAGNOSIS — R73.9 ELEVATED BLOOD SUGAR: ICD-10-CM

## 2018-02-24 DIAGNOSIS — E03.9 HYPOTHYROIDISM, UNSPECIFIED TYPE: Primary | ICD-10-CM

## 2018-02-26 ENCOUNTER — APPOINTMENT (OUTPATIENT)
Dept: LAB | Facility: HOSPITAL | Age: 58
End: 2018-02-26
Attending: INTERNAL MEDICINE
Payer: COMMERCIAL

## 2018-02-26 DIAGNOSIS — E03.9 HYPOTHYROIDISM, UNSPECIFIED TYPE: ICD-10-CM

## 2018-02-26 LAB — TSH SERPL-ACNC: 2.28 UIU/ML (ref 0.45–5.33)

## 2018-02-26 PROCEDURE — 36415 COLL VENOUS BLD VENIPUNCTURE: CPT

## 2018-02-26 PROCEDURE — 84443 ASSAY THYROID STIM HORMONE: CPT

## 2018-02-26 NOTE — TELEPHONE ENCOUNTER
Please call pt  -  sorry  For  The  Confusion regarding  Labs  Order    -  Labs  Are  In system now  and  Can still see me on  Thursday  -  If  You   Can   To complete  Before  Visit  3/1 /18 That  Would  Be best -   Than  Will  Make  Refills  At  The same

## 2018-02-26 NOTE — TELEPHONE ENCOUNTER
Spoke to Susie and let her know that everything was in the system to be able to get her bloodwork complete. Pt stated understanding and will try to go either today or tomorrow for at least the thyroid portion of what was ordered.   Apologized to the patien

## 2018-02-26 NOTE — TELEPHONE ENCOUNTER
Pt is requesting an order for labs to have her thyroid checked. Last lab dated 01/04/18. Should pt have repeat labs? Thank you. Mylene Carpenter Please respond to pool: NELSON BERNARD LMB LPN/CMA

## 2018-03-01 ENCOUNTER — OFFICE VISIT (OUTPATIENT)
Dept: INTERNAL MEDICINE CLINIC | Facility: CLINIC | Age: 58
End: 2018-03-01

## 2018-03-01 VITALS
SYSTOLIC BLOOD PRESSURE: 129 MMHG | HEIGHT: 64 IN | TEMPERATURE: 98 F | HEART RATE: 73 BPM | DIASTOLIC BLOOD PRESSURE: 86 MMHG | RESPIRATION RATE: 20 BRPM | BODY MASS INDEX: 29.77 KG/M2 | WEIGHT: 174.38 LBS

## 2018-03-01 DIAGNOSIS — E03.8 HYPOTHYROIDISM DUE TO HASHIMOTO'S THYROIDITIS: ICD-10-CM

## 2018-03-01 DIAGNOSIS — E06.3 HYPOTHYROIDISM DUE TO HASHIMOTO'S THYROIDITIS: ICD-10-CM

## 2018-03-01 DIAGNOSIS — J30.9 ALLERGIC RHINITIS, UNSPECIFIED SEASONALITY, UNSPECIFIED TRIGGER: Primary | ICD-10-CM

## 2018-03-01 PROCEDURE — 99214 OFFICE O/P EST MOD 30 MIN: CPT | Performed by: INTERNAL MEDICINE

## 2018-03-01 PROCEDURE — 99212 OFFICE O/P EST SF 10 MIN: CPT | Performed by: INTERNAL MEDICINE

## 2018-03-01 RX ORDER — LEVOTHYROXINE SODIUM 0.1 MG/1
100 TABLET ORAL
Qty: 90 TABLET | Refills: 1 | Status: SHIPPED | OUTPATIENT
Start: 2018-03-01 | End: 2018-08-25

## 2018-03-01 NOTE — PROGRESS NOTES
HPI:    Patient ID: Wilmar Reina is a 59year old female. Medication Request   Associated symptoms include congestion and coughing.  Pertinent negatives include no abdominal pain, chest pain, chills, fatigue, fever, headaches, myalgias, nausea, dillon breakfast. Disp: 90 tablet Rfl: 1   Rosuvastatin Calcium (CRESTOR) 5 MG Oral Tab Take 1 tablet (5 mg total) by mouth nightly.  Disp: 90 tablet Rfl: 3   Pantoprazole Sodium (PROTONIX) 40 MG Oral Tab EC Take 1 tablet (40 mg total) by mouth every morning befor She has no cervical adenopathy. Right axillary: No pectoral and no lateral adenopathy present. Left axillary: No pectoral and no lateral adenopathy present. Right: No supraclavicular adenopathy present.         Left: No supraclavicul

## 2018-05-19 DIAGNOSIS — K21.9 GASTROESOPHAGEAL REFLUX DISEASE, ESOPHAGITIS PRESENCE NOT SPECIFIED: ICD-10-CM

## 2018-05-21 RX ORDER — PANTOPRAZOLE SODIUM 40 MG/1
40 TABLET, DELAYED RELEASE ORAL
Qty: 90 TABLET | Refills: 0 | Status: SHIPPED | OUTPATIENT
Start: 2018-05-21 | End: 2018-08-25

## 2018-05-21 RX ORDER — ROSUVASTATIN CALCIUM 5 MG/1
5 TABLET, COATED ORAL NIGHTLY
Qty: 90 TABLET | Refills: 0 | Status: SHIPPED | OUTPATIENT
Start: 2018-05-21 | End: 2018-08-25

## 2018-05-21 NOTE — TELEPHONE ENCOUNTER
Cholesterol Medication Protocol Passed5/21 12:13 PM   ALT in past 12 months    LDL in past 12 months    Last ALT < 80    Last LDL < 130    Appointment in past 12 or next 3 months       Gastrointestional Medication Protocol Passed5/21 4:29 PM   Appointment

## 2018-05-21 NOTE — TELEPHONE ENCOUNTER
From: Jan Brown  Sent: 5/19/2018 10:04 PM CDT  Subject: Medication Renewal Request    Jan Brown would like a refill of the following medications:     Rosuvastatin Calcium (CRESTOR) 5 MG Oral Tab Jan Pierce MD]   Patient Comment:

## 2018-06-05 ENCOUNTER — HOSPITAL ENCOUNTER (OUTPATIENT)
Dept: MAMMOGRAPHY | Age: 58
Discharge: HOME | End: 2018-06-05
Attending: SURGERY
Payer: COMMERCIAL

## 2018-06-05 DIAGNOSIS — Z12.39 SCREENING FOR BREAST CANCER: ICD-10-CM

## 2018-06-05 PROCEDURE — 77067 SCR MAMMO BI INCL CAD: CPT | Performed by: SURGERY

## 2018-06-18 ENCOUNTER — NURSE TRIAGE (OUTPATIENT)
Dept: OTHER | Age: 58
End: 2018-06-18

## 2018-06-18 NOTE — TELEPHONE ENCOUNTER
Action Requested: Summary for Provider     []  Critical Lab, Recommendations Needed  [] Need Additional Advice  []   FYI    []   Need Orders  [] Need Medications Sent to Pharmacy  []  Other     SUMMARY: Spoke with patient who reports she received 2 mosquit

## 2018-06-19 ENCOUNTER — OFFICE VISIT (OUTPATIENT)
Dept: INTERNAL MEDICINE CLINIC | Facility: CLINIC | Age: 58
End: 2018-06-19

## 2018-06-19 VITALS
DIASTOLIC BLOOD PRESSURE: 75 MMHG | HEART RATE: 89 BPM | WEIGHT: 179 LBS | SYSTOLIC BLOOD PRESSURE: 114 MMHG | TEMPERATURE: 98 F | BODY MASS INDEX: 31 KG/M2 | RESPIRATION RATE: 16 BRPM

## 2018-06-19 DIAGNOSIS — L03.116 CELLULITIS OF LEFT LOWER EXTREMITY: Primary | ICD-10-CM

## 2018-06-19 PROCEDURE — 99213 OFFICE O/P EST LOW 20 MIN: CPT | Performed by: INTERNAL MEDICINE

## 2018-06-19 PROCEDURE — 99212 OFFICE O/P EST SF 10 MIN: CPT | Performed by: INTERNAL MEDICINE

## 2018-06-19 RX ORDER — SULFAMETHOXAZOLE AND TRIMETHOPRIM 800; 160 MG/1; MG/1
1 TABLET ORAL 2 TIMES DAILY
Qty: 10 TABLET | Refills: 0 | Status: SHIPPED | OUTPATIENT
Start: 2018-06-19 | End: 2018-06-24

## 2018-06-19 NOTE — PROGRESS NOTES
Hazel Garcia is a 59year old female. Patient presents with:  Cellulitis (integumentary, infectious): Left leg x 3 days      HPI:   Comes as an urgent visit  C/c left leg ?  Bug bite   C/o left leg swelling and warm   Was in the backyard with her gra fatigue  SKIN:as above   RESPIRATORY: denies shortness of breath, cough, wheezing  CARDIOVASCULAR: denies chest pain on exertion, palpitations, swelling in feet  MUS: No back pain, joint pain, muscle pain  NEURO: denies headaches , anxiety, depression    E

## 2018-06-19 NOTE — PATIENT INSTRUCTIONS
Cellulitis  Cellulitis is an infection of the deep layers of skin. A break in the skin, such as a cut or scratch, can let bacteria under the skin. If the bacteria get to deep layers of the skin, it can be serious.  If not treated, cellulitis can get into · Fever higher of 100.4º F (38.0º C) or higher after 2 days on antibiotics  Date Last Reviewed: 9/1/2016  © 4529-6941 [employer]. [address] [facility], [address] [place] [address]. All rights reserved.  This information is not intended as a substitut

## 2018-08-25 ENCOUNTER — APPOINTMENT (OUTPATIENT)
Dept: LAB | Facility: HOSPITAL | Age: 58
End: 2018-08-25
Attending: INTERNAL MEDICINE
Payer: COMMERCIAL

## 2018-08-25 DIAGNOSIS — R73.9 ELEVATED BLOOD SUGAR: ICD-10-CM

## 2018-08-25 DIAGNOSIS — K21.9 GASTROESOPHAGEAL REFLUX DISEASE, ESOPHAGITIS PRESENCE NOT SPECIFIED: ICD-10-CM

## 2018-08-25 LAB
ALBUMIN SERPL BCP-MCNC: 3.9 G/DL (ref 3.5–4.8)
ALBUMIN/GLOB SERPL: 1.2 {RATIO} (ref 1–2)
ALP SERPL-CCNC: 37 U/L (ref 32–100)
ALT SERPL-CCNC: 18 U/L (ref 14–54)
ANION GAP SERPL CALC-SCNC: 7 MMOL/L (ref 0–18)
AST SERPL-CCNC: 20 U/L (ref 15–41)
BILIRUB SERPL-MCNC: 1.4 MG/DL (ref 0.3–1.2)
BUN SERPL-MCNC: 10 MG/DL (ref 8–20)
BUN/CREAT SERPL: 11 (ref 10–20)
CALCIUM SERPL-MCNC: 9.3 MG/DL (ref 8.5–10.5)
CHLORIDE SERPL-SCNC: 99 MMOL/L (ref 95–110)
CO2 SERPL-SCNC: 30 MMOL/L (ref 22–32)
CREAT SERPL-MCNC: 0.91 MG/DL (ref 0.5–1.5)
GLOBULIN PLAS-MCNC: 3.3 G/DL (ref 2.5–3.7)
GLUCOSE SERPL-MCNC: 110 MG/DL (ref 70–99)
HBA1C MFR BLD: 5.9 % (ref 4–6)
OSMOLALITY UR CALC.SUM OF ELEC: 282 MOSM/KG (ref 275–295)
PATIENT FASTING: YES
POTASSIUM SERPL-SCNC: 3.8 MMOL/L (ref 3.3–5.1)
PROT SERPL-MCNC: 7.2 G/DL (ref 5.9–8.4)
SODIUM SERPL-SCNC: 136 MMOL/L (ref 136–144)

## 2018-08-25 PROCEDURE — 83036 HEMOGLOBIN GLYCOSYLATED A1C: CPT

## 2018-08-25 PROCEDURE — 80053 COMPREHEN METABOLIC PANEL: CPT

## 2018-08-25 PROCEDURE — 36415 COLL VENOUS BLD VENIPUNCTURE: CPT

## 2018-08-25 NOTE — TELEPHONE ENCOUNTER
From: Clara Dooley  Sent: 8/25/2018 8:20 AM CDT  Subject: Medication Renewal Request    Clara Dooley would like a refill of the following medications:     Levothyroxine Sodium 100 MCG Oral Tab Barbara Christianson MD]   Patient Comment: 3 month pt lethargic

## 2018-08-27 RX ORDER — ROSUVASTATIN CALCIUM 5 MG/1
5 TABLET, COATED ORAL NIGHTLY
Qty: 90 TABLET | Refills: 0 | Status: SHIPPED
Start: 2018-08-27 | End: 2018-11-17

## 2018-08-27 RX ORDER — PANTOPRAZOLE SODIUM 40 MG/1
40 TABLET, DELAYED RELEASE ORAL
Qty: 90 TABLET | Refills: 0 | Status: SHIPPED
Start: 2018-08-27 | End: 2018-11-17

## 2018-08-27 RX ORDER — LEVOTHYROXINE SODIUM 0.1 MG/1
100 TABLET ORAL
Qty: 90 TABLET | Refills: 1 | Status: SHIPPED
Start: 2018-08-27 | End: 2018-11-17

## 2018-08-27 NOTE — TELEPHONE ENCOUNTER
Hypothyroid Medications  Protocol Criteria:  Appointment scheduled in the past 12 months or the next 3 months  TSH resulted in the past 12 months that is normal  Recent Outpatient Visits            2 months ago Cellulitis of left lower extremity    [place] Results  Component Value Date    (H) 01/04/2018       Lab Results  Component Value Date   ALT 18 08/25/2018         Refill Protocol Appointment Criteria  · Appointment scheduled in the past 12 months or in the next 3 months  Recent Outpatient Visits

## 2018-11-17 DIAGNOSIS — K21.9 GASTROESOPHAGEAL REFLUX DISEASE, ESOPHAGITIS PRESENCE NOT SPECIFIED: ICD-10-CM

## 2018-11-18 RX ORDER — ROSUVASTATIN CALCIUM 5 MG/1
5 TABLET, COATED ORAL NIGHTLY
Qty: 90 TABLET | Refills: 0 | Status: SHIPPED | OUTPATIENT
Start: 2018-11-18 | End: 2019-02-23

## 2018-11-18 RX ORDER — PANTOPRAZOLE SODIUM 40 MG/1
40 TABLET, DELAYED RELEASE ORAL
Qty: 90 TABLET | Refills: 0 | Status: SHIPPED | OUTPATIENT
Start: 2018-11-18 | End: 2019-02-23

## 2018-11-18 RX ORDER — LEVOTHYROXINE SODIUM 0.1 MG/1
100 TABLET ORAL
Qty: 90 TABLET | Refills: 0 | Status: SHIPPED | OUTPATIENT
Start: 2018-11-18 | End: 2019-02-23

## 2019-02-23 DIAGNOSIS — K21.9 GASTROESOPHAGEAL REFLUX DISEASE, ESOPHAGITIS PRESENCE NOT SPECIFIED: ICD-10-CM

## 2019-02-25 RX ORDER — LEVOTHYROXINE SODIUM 0.1 MG/1
100 TABLET ORAL
Qty: 90 TABLET | Refills: 0 | Status: SHIPPED | OUTPATIENT
Start: 2019-02-25 | End: 2019-05-02

## 2019-02-25 RX ORDER — PANTOPRAZOLE SODIUM 40 MG/1
40 TABLET, DELAYED RELEASE ORAL
Qty: 90 TABLET | Refills: 0 | Status: SHIPPED | OUTPATIENT
Start: 2019-02-25 | End: 2019-03-14

## 2019-02-26 NOTE — TELEPHONE ENCOUNTER
Please review; protocol failed.     Cholesterol Medications  Protocol Criteria:  · Appointment scheduled in the past 12 months or in the next 3 months  · ALT & LDL on file in the past 12 months  · ALT result < 80  · LDL result <130   Recent Outpatient Visit Hypothyroid Medications  Protocol Criteria:  Appointment scheduled in the past 12 months or the next 3 months  TSH resulted in the past 12 months that is normal  Recent Outpatient Visits            8 months ago Cellulitis of left lower extremity    E

## 2019-02-28 RX ORDER — ROSUVASTATIN CALCIUM 5 MG/1
5 TABLET, COATED ORAL NIGHTLY
Qty: 90 TABLET | Refills: 0 | Status: SHIPPED | OUTPATIENT
Start: 2019-02-28 | End: 2019-05-25

## 2019-03-14 ENCOUNTER — OFFICE VISIT (OUTPATIENT)
Dept: INTERNAL MEDICINE CLINIC | Facility: CLINIC | Age: 59
End: 2019-03-14
Payer: COMMERCIAL

## 2019-03-14 VITALS
DIASTOLIC BLOOD PRESSURE: 83 MMHG | HEART RATE: 87 BPM | WEIGHT: 190.19 LBS | HEIGHT: 64 IN | TEMPERATURE: 98 F | RESPIRATION RATE: 20 BRPM | SYSTOLIC BLOOD PRESSURE: 134 MMHG | BODY MASS INDEX: 32.47 KG/M2

## 2019-03-14 DIAGNOSIS — E78.00 PURE HYPERCHOLESTEROLEMIA: Primary | ICD-10-CM

## 2019-03-14 DIAGNOSIS — K21.9 GASTROESOPHAGEAL REFLUX DISEASE, ESOPHAGITIS PRESENCE NOT SPECIFIED: ICD-10-CM

## 2019-03-14 DIAGNOSIS — E03.8 HYPOTHYROIDISM DUE TO HASHIMOTO'S THYROIDITIS: ICD-10-CM

## 2019-03-14 DIAGNOSIS — E06.3 HYPOTHYROIDISM DUE TO HASHIMOTO'S THYROIDITIS: ICD-10-CM

## 2019-03-14 DIAGNOSIS — Z00.00 PHYSICAL EXAM: ICD-10-CM

## 2019-03-14 DIAGNOSIS — R49.0 HOARSENESS OF VOICE: ICD-10-CM

## 2019-03-14 DIAGNOSIS — Z12.31 SCREENING MAMMOGRAM, ENCOUNTER FOR: ICD-10-CM

## 2019-03-14 PROCEDURE — 99213 OFFICE O/P EST LOW 20 MIN: CPT | Performed by: INTERNAL MEDICINE

## 2019-03-14 PROCEDURE — 99212 OFFICE O/P EST SF 10 MIN: CPT | Performed by: INTERNAL MEDICINE

## 2019-03-14 PROCEDURE — 99396 PREV VISIT EST AGE 40-64: CPT | Performed by: INTERNAL MEDICINE

## 2019-03-14 RX ORDER — PANTOPRAZOLE SODIUM 40 MG/1
40 TABLET, DELAYED RELEASE ORAL
Qty: 180 TABLET | Refills: 0 | Status: SHIPPED | OUTPATIENT
Start: 2019-03-14 | End: 2019-09-19

## 2019-03-14 NOTE — PROGRESS NOTES
HPI:    Patient ID: Nash Josue is a 59year old female. Patient presents with:  Physical: Pt is presenting today for a physical       Patient presents today for physical exam, states doing well otherwise, denies chest pain, shortness of breath, dysp time  Was told that she is snoring      Cardiovascular: Negative for chest pain, palpitations and leg swelling. Gastrointestinal: Negative for abdominal pain, constipation, diarrhea, nausea and vomiting.         Heartburns at least 3-4 times a week per we scleral icterus. Neck: Neck supple. No JVD present. No thyromegaly present. Cardiovascular: Normal rate, regular rhythm and normal heart sounds. No murmur heard. Pulmonary/Chest: Effort normal and breath sounds normal. No respiratory distress.  She h Aspirin-based medications  Advised to avoid wearing  tight clothes   Advised to elevate the head of the bed   Avoid eating at least 3 hours before bedtime   Counseling on ideal weight/BMI  Advised patient to increase pantoprazole twice daily qd in the morn Prescriptions     Signed Prescriptions Disp Refills   • Pantoprazole Sodium (PROTONIX) 40 MG Oral Tab  tablet 0     Sig: Take 1 tablet (40 mg total) by mouth 2 (two) times daily before meals.  In am   30- 60 min before  breakfast  and  30  - 60 min be

## 2019-04-02 ENCOUNTER — OFFICE VISIT (OUTPATIENT)
Dept: OTOLARYNGOLOGY | Facility: CLINIC | Age: 59
End: 2019-04-02
Payer: COMMERCIAL

## 2019-04-02 VITALS
HEIGHT: 65 IN | DIASTOLIC BLOOD PRESSURE: 88 MMHG | BODY MASS INDEX: 31.65 KG/M2 | SYSTOLIC BLOOD PRESSURE: 156 MMHG | TEMPERATURE: 98 F | WEIGHT: 190 LBS

## 2019-04-02 DIAGNOSIS — R49.0 HOARSENESS: Primary | ICD-10-CM

## 2019-04-02 PROCEDURE — 99212 OFFICE O/P EST SF 10 MIN: CPT | Performed by: OTOLARYNGOLOGY

## 2019-04-02 PROCEDURE — 99214 OFFICE O/P EST MOD 30 MIN: CPT | Performed by: OTOLARYNGOLOGY

## 2019-04-02 PROCEDURE — 31575 DIAGNOSTIC LARYNGOSCOPY: CPT | Performed by: OTOLARYNGOLOGY

## 2019-04-02 RX ORDER — AZELASTINE 1 MG/ML
2 SPRAY, METERED NASAL 2 TIMES DAILY
Qty: 1 BOTTLE | Refills: 0 | Status: SHIPPED | OUTPATIENT
Start: 2019-04-02 | End: 2020-02-26 | Stop reason: ALTCHOICE

## 2019-04-02 RX ORDER — MONTELUKAST SODIUM 10 MG/1
10 TABLET ORAL NIGHTLY
Qty: 30 TABLET | Refills: 3 | Status: SHIPPED | OUTPATIENT
Start: 2019-04-02 | End: 2019-09-23

## 2019-04-02 NOTE — PROGRESS NOTES
Berny Felix is a 59year old female. Patient presents with:  Voice Problem: hoarseness       HISTORY OF PRESENT ILLNESS  She presents with a history of US of the thyroid for presumed thyroid enlargement with normal thyroid noted.  1.9 cm node noted Alcohol use:  Yes        Alcohol/week: 0.5 oz        Types: 1 Glasses of wine per week        Comment:        Drug use: No    Other Topics      Concerns:        Caffeine Concern: Yes          soda, 8oz daily        Pt has a pacemaker: No        Pt has a person & situation. Appropriate mood and affect.    Neck Exam Normal Inspection - Normal. Palpation - Normal. Parotid gland - Normal. Thyroid gland - Normal.   Eyes Normal Conjunctiva - Right: Normal, Left: Normal. Pupil - Right: Normal, Left: Normal. Fundu swollen  Pyriform sinus:  Bilateral: Pyriform sinuses are normal.  Base of tongue is normal in appearance. There is no airway obstruction. General comments: No lesions or masses noted.   Mild swelling of vocal cord                Current Outpatient Medic

## 2019-04-27 ENCOUNTER — LAB ENCOUNTER (OUTPATIENT)
Dept: LAB | Facility: HOSPITAL | Age: 59
End: 2019-04-27
Attending: INTERNAL MEDICINE
Payer: COMMERCIAL

## 2019-04-27 DIAGNOSIS — Z00.00 PHYSICAL EXAM: ICD-10-CM

## 2019-04-27 DIAGNOSIS — E78.00 PURE HYPERCHOLESTEROLEMIA: ICD-10-CM

## 2019-04-27 DIAGNOSIS — E03.8 HYPOTHYROIDISM DUE TO HASHIMOTO'S THYROIDITIS: ICD-10-CM

## 2019-04-27 DIAGNOSIS — E06.3 HYPOTHYROIDISM DUE TO HASHIMOTO'S THYROIDITIS: ICD-10-CM

## 2019-04-27 PROCEDURE — 84443 ASSAY THYROID STIM HORMONE: CPT

## 2019-04-27 PROCEDURE — 80061 LIPID PANEL: CPT

## 2019-04-27 PROCEDURE — 84439 ASSAY OF FREE THYROXINE: CPT

## 2019-04-27 PROCEDURE — 36415 COLL VENOUS BLD VENIPUNCTURE: CPT

## 2019-04-27 PROCEDURE — 81001 URINALYSIS AUTO W/SCOPE: CPT

## 2019-04-27 PROCEDURE — 80053 COMPREHEN METABOLIC PANEL: CPT

## 2019-04-27 PROCEDURE — 85025 COMPLETE CBC W/AUTO DIFF WBC: CPT

## 2019-05-07 ENCOUNTER — OFFICE VISIT (OUTPATIENT)
Dept: OTOLARYNGOLOGY | Facility: CLINIC | Age: 59
End: 2019-05-07
Payer: COMMERCIAL

## 2019-05-07 VITALS
TEMPERATURE: 97 F | DIASTOLIC BLOOD PRESSURE: 85 MMHG | HEIGHT: 64 IN | SYSTOLIC BLOOD PRESSURE: 127 MMHG | BODY MASS INDEX: 30.73 KG/M2 | WEIGHT: 180 LBS

## 2019-05-07 DIAGNOSIS — R49.0 HOARSENESS: Primary | ICD-10-CM

## 2019-05-07 PROCEDURE — 99212 OFFICE O/P EST SF 10 MIN: CPT | Performed by: OTOLARYNGOLOGY

## 2019-05-07 PROCEDURE — 99214 OFFICE O/P EST MOD 30 MIN: CPT | Performed by: OTOLARYNGOLOGY

## 2019-05-07 NOTE — PROGRESS NOTES
Keshav Fox is a 59year old female. Patient presents with:   Follow - Up: regarding hoarseness, improvement in symptoms       HISTORY OF PRESENT ILLNESS  She presents with a history of US of the thyroid for presumed thyroid enlargement with normal to once a day. She has only been able to tolerate Loratadine-D once a day as it does cause her to have palpitations at times. No other signs, symptoms or complaints.   Overall she feels that she is at least 50% better than the last time I saw her      Soc heartbeat/palpitations and syncope. GI Negative Abdominal pain and diarrhea. Endocrine Negative Cold intolerance and heat intolerance. Neuro Negative Tremors. Psych Negative Anxiety and depression.    Integumentary Negative Frequent skin infections, nightly., Disp: 30 tablet, Rfl: 3  •  Loratadine-Pseudoephedrine ER 5-120 MG Oral Tablet 12 Hr, Take 1 tablet by mouth every 12 (twelve) hours. , Disp: 60 tablet, Rfl: 3  •  Azelastine HCl 0.1 % Nasal Solution, 2 sprays by Nasal route 2 (two) times daily. ,

## 2019-05-28 RX ORDER — ROSUVASTATIN CALCIUM 5 MG/1
5 TABLET, COATED ORAL NIGHTLY
Qty: 90 TABLET | Refills: 1 | Status: SHIPPED | OUTPATIENT
Start: 2019-05-28 | End: 2020-07-27

## 2019-05-28 RX ORDER — LEVOTHYROXINE SODIUM 0.1 MG/1
TABLET ORAL
Qty: 90 TABLET | Refills: 0 | OUTPATIENT
Start: 2019-05-28

## 2019-05-28 NOTE — TELEPHONE ENCOUNTER
Thyroid medication dose increased on 5/2/19, refill refused. Protocol passed.   Cholesterol Medications  Protocol Criteria:  · Appointment scheduled in the past 12 months or in the next 3 months  · ALT & LDL on file in the past 12 months  · ALT result

## 2019-06-24 RX ORDER — LEVOTHYROXINE SODIUM 112 UG/1
TABLET ORAL
Qty: 90 TABLET | Refills: 1 | Status: SHIPPED | OUTPATIENT
Start: 2019-06-24 | End: 2019-09-23

## 2019-06-24 NOTE — TELEPHONE ENCOUNTER
Refill passed per [facility], [facility] protocol.   Hypothyroid Medications  Protocol Criteria:  Appointment scheduled in the past 12 months or the next 3 months  TSH resulted in the past 12 months that is normal  Recent Outpatient Visits            1 month ago H

## 2019-07-17 ENCOUNTER — LAB ENCOUNTER (OUTPATIENT)
Dept: LAB | Age: 59
End: 2019-07-17
Attending: INTERNAL MEDICINE
Payer: COMMERCIAL

## 2019-07-17 ENCOUNTER — HOSPITAL ENCOUNTER (OUTPATIENT)
Dept: MAMMOGRAPHY | Age: 59
Discharge: HOME | End: 2019-07-17
Attending: INTERNAL MEDICINE
Payer: COMMERCIAL

## 2019-07-17 DIAGNOSIS — Z12.31 SCREENING MAMMOGRAM, ENCOUNTER FOR: ICD-10-CM

## 2019-07-17 DIAGNOSIS — E03.8 HYPOTHYROIDISM DUE TO HASHIMOTO'S THYROIDITIS: ICD-10-CM

## 2019-07-17 DIAGNOSIS — E06.3 HYPOTHYROIDISM DUE TO HASHIMOTO'S THYROIDITIS: ICD-10-CM

## 2019-07-17 LAB
T4 FREE SERPL-MCNC: 1.2 NG/DL (ref 0.8–1.7)
TSI SER-ACNC: 1.88 MIU/ML (ref 0.36–3.74)

## 2019-07-17 PROCEDURE — 36415 COLL VENOUS BLD VENIPUNCTURE: CPT

## 2019-07-17 PROCEDURE — 84439 ASSAY OF FREE THYROXINE: CPT

## 2019-07-17 PROCEDURE — 77067 SCR MAMMO BI INCL CAD: CPT | Performed by: INTERNAL MEDICINE

## 2019-07-17 PROCEDURE — 77063 BREAST TOMOSYNTHESIS BI: CPT | Performed by: INTERNAL MEDICINE

## 2019-07-17 PROCEDURE — 84443 ASSAY THYROID STIM HORMONE: CPT

## 2019-09-19 DIAGNOSIS — K21.9 GASTROESOPHAGEAL REFLUX DISEASE, ESOPHAGITIS PRESENCE NOT SPECIFIED: ICD-10-CM

## 2019-09-19 RX ORDER — PANTOPRAZOLE SODIUM 40 MG/1
TABLET, DELAYED RELEASE ORAL
Qty: 90 TABLET | Refills: 1 | Status: SHIPPED | OUTPATIENT
Start: 2019-09-19 | End: 2020-05-26

## 2019-09-20 NOTE — TELEPHONE ENCOUNTER
Refill passed per [facility], [facility] protocol.   Refill Protocol Appointment Criteria  · Appointment scheduled in the past 12 months or in the next 3 months  Recent Outpatient Visits            4 months ago [facility] for [facility]

## 2019-09-23 ENCOUNTER — PATIENT MESSAGE (OUTPATIENT)
Dept: INTERNAL MEDICINE CLINIC | Facility: CLINIC | Age: 59
End: 2019-09-23

## 2019-09-23 RX ORDER — LEVOTHYROXINE SODIUM 112 UG/1
TABLET ORAL
Qty: 90 TABLET | Refills: 0 | OUTPATIENT
Start: 2019-09-23

## 2019-09-23 NOTE — TELEPHONE ENCOUNTER
Spoke with pharmacy. Duplicate request. Prescription ready for . Pharmacy will call patient and let her know.

## 2019-09-24 RX ORDER — MONTELUKAST SODIUM 10 MG/1
10 TABLET ORAL NIGHTLY
Qty: 30 TABLET | Refills: 3 | Status: SHIPPED | OUTPATIENT
Start: 2019-09-24 | End: 2020-01-18

## 2019-09-24 NOTE — TELEPHONE ENCOUNTER
From: Keshav Fox  To: Rakel Colon MD  Sent: 9/23/2019 8:40 PM CDT  Subject: Prescription Question    Please disregard previous note about Levothyorine refill. Apparently it was a pharmacy mix up between 2 Minh Candelario.  I do have one more

## 2019-09-24 NOTE — TELEPHONE ENCOUNTER
From: Catarino Montemayor  To: Khris Hernandez MD  Sent: 9/23/2019 8:23 PM CDT  Subject: Prescription Question    I have recently moved so I [facility] to request refill of Levothyoxine 112 mcg from you and it was DENIED.  I only hav

## 2019-09-25 RX ORDER — LEVOTHYROXINE SODIUM 112 UG/1
112 TABLET ORAL
Qty: 90 TABLET | Refills: 1 | Status: SHIPPED | OUTPATIENT
Start: 2019-09-25 | End: 2020-06-11

## 2019-09-25 NOTE — TELEPHONE ENCOUNTER
Refill passed per [facility] protocol.     Requested Prescriptions   Pending Prescriptions Disp Refills   • Levothyroxine Sodium 112 MCG Oral Tab 90 tablet 1     Sig: Take 1 tablet (112 mcg total) by mouth before breakfast. 30-60mins before breakfast.

## 2019-11-06 RX ORDER — LORATADINE, PSEUDOEPHEDRINE 5; 120 MG/1; MG/1
TABLET, EXTENDED RELEASE ORAL
Qty: 60 TABLET | Refills: 0 | Status: SHIPPED
Start: 2019-11-06 | End: 2021-03-17

## 2019-11-06 NOTE — TELEPHONE ENCOUNTER
Last office visit 5/7/19. Was supposed to fu in the summer but didn't, no fu scheduled. Refill for loratidine-d pended.

## 2019-11-09 RX ORDER — LORATADINE, PSEUDOEPHEDRINE 5; 120 MG/1; MG/1
TABLET, EXTENDED RELEASE ORAL
Qty: 60 TABLET | Refills: 0 | OUTPATIENT
Start: 2019-11-09

## 2020-01-18 RX ORDER — MONTELUKAST SODIUM 10 MG/1
10 TABLET ORAL NIGHTLY
Qty: 30 TABLET | Refills: 3 | Status: SHIPPED | OUTPATIENT
Start: 2020-01-18 | End: 2020-07-27

## 2020-02-11 ENCOUNTER — HOSPITAL ENCOUNTER (OUTPATIENT)
Age: 60
Discharge: HOME | End: 2020-02-11
Attending: EMERGENCY MEDICINE
Payer: COMMERCIAL

## 2020-02-11 VITALS
WEIGHT: 185 LBS | DIASTOLIC BLOOD PRESSURE: 82 MMHG | SYSTOLIC BLOOD PRESSURE: 148 MMHG | HEART RATE: 108 BPM | BODY MASS INDEX: 32 KG/M2 | OXYGEN SATURATION: 99 % | TEMPERATURE: 98 F | RESPIRATION RATE: 18 BRPM

## 2020-02-11 DIAGNOSIS — N30.01 ACUTE CYSTITIS WITH HEMATURIA: Primary | ICD-10-CM

## 2020-02-11 LAB
BILIRUB UR QL STRIP: NEGATIVE
COLOR UR: YELLOW
GLUCOSE UR STRIP-MCNC: NEGATIVE MG/DL
KETONES UR STRIP-MCNC: NEGATIVE MG/DL
NITRITE UR QL STRIP: POSITIVE
PH UR STRIP: 7 [PH]
PROT UR STRIP-MCNC: NEGATIVE MG/DL
SP GR UR STRIP: 1.01
UROBILINOGEN UR STRIP-ACNC: <2 MG/DL

## 2020-02-11 PROCEDURE — 99204 OFFICE O/P NEW MOD 45 MIN: CPT

## 2020-02-11 PROCEDURE — 87086 URINE CULTURE/COLONY COUNT: CPT | Performed by: EMERGENCY MEDICINE

## 2020-02-11 PROCEDURE — 87186 SC STD MICRODIL/AGAR DIL: CPT | Performed by: EMERGENCY MEDICINE

## 2020-02-11 PROCEDURE — 99214 OFFICE O/P EST MOD 30 MIN: CPT

## 2020-02-11 PROCEDURE — 87077 CULTURE AEROBIC IDENTIFY: CPT | Performed by: EMERGENCY MEDICINE

## 2020-02-11 PROCEDURE — 81002 URINALYSIS NONAUTO W/O SCOPE: CPT

## 2020-02-11 RX ORDER — FLUCONAZOLE 150 MG/1
TABLET ORAL
Qty: 2 TABLET | Refills: 0 | Status: SHIPPED | OUTPATIENT
Start: 2020-02-11 | End: 2020-02-26 | Stop reason: ALTCHOICE

## 2020-02-11 RX ORDER — ATORVASTATIN CALCIUM 10 MG/1
10 TABLET, FILM COATED ORAL NIGHTLY
COMMUNITY
End: 2020-02-26

## 2020-02-11 RX ORDER — CEPHALEXIN 500 MG/1
500 CAPSULE ORAL 3 TIMES DAILY
Qty: 21 CAPSULE | Refills: 0 | Status: SHIPPED | OUTPATIENT
Start: 2020-02-11 | End: 2020-02-18

## 2020-02-12 NOTE — ED PROVIDER NOTES
Patient Seen in: [address] [facility]      History   Patient presents with:  Urinary Symptoms    Stated Complaint: urinary symptoms    HPI    Patient is a 17-year-old female without contributory past medical history complains of dy BMI 31.76 kg/m²         Physical Exam    Alert and in no acute distress  HEENT: Normocephalic atraumatic  Oropharynx: Mucous membranes moist  Neck: Supple without adenopathy  Back: Full range of motion, no CVA tenderness  Abdomen: Normal active bowel sound

## 2020-02-26 ENCOUNTER — APPOINTMENT (OUTPATIENT)
Dept: LAB | Age: 60
End: 2020-02-26
Attending: INTERNAL MEDICINE
Payer: COMMERCIAL

## 2020-02-26 ENCOUNTER — OFFICE VISIT (OUTPATIENT)
Dept: INTERNAL MEDICINE CLINIC | Facility: CLINIC | Age: 60
End: 2020-02-26
Payer: COMMERCIAL

## 2020-02-26 VITALS
BODY MASS INDEX: 32.47 KG/M2 | RESPIRATION RATE: 20 BRPM | DIASTOLIC BLOOD PRESSURE: 80 MMHG | HEIGHT: 64 IN | SYSTOLIC BLOOD PRESSURE: 139 MMHG | HEART RATE: 88 BPM | TEMPERATURE: 97 F | OXYGEN SATURATION: 96 % | WEIGHT: 190.19 LBS

## 2020-02-26 DIAGNOSIS — N39.0 URINARY TRACT INFECTION WITHOUT HEMATURIA, SITE UNSPECIFIED: Primary | ICD-10-CM

## 2020-02-26 DIAGNOSIS — N39.0 URINARY TRACT INFECTION WITHOUT HEMATURIA, SITE UNSPECIFIED: ICD-10-CM

## 2020-02-26 DIAGNOSIS — E78.00 PURE HYPERCHOLESTEROLEMIA: ICD-10-CM

## 2020-02-26 DIAGNOSIS — E03.8 HYPOTHYROIDISM DUE TO HASHIMOTO'S THYROIDITIS: ICD-10-CM

## 2020-02-26 DIAGNOSIS — Z12.11 SCREENING FOR MALIGNANT NEOPLASM OF COLON: ICD-10-CM

## 2020-02-26 DIAGNOSIS — E06.3 HYPOTHYROIDISM DUE TO HASHIMOTO'S THYROIDITIS: ICD-10-CM

## 2020-02-26 LAB
BACTERIA UR QL AUTO: NEGATIVE /HPF
BILIRUB UR QL: NEGATIVE
CLARITY UR: CLEAR
COLOR UR: COLORLESS
GLUCOSE UR-MCNC: NEGATIVE MG/DL
KETONES UR-MCNC: NEGATIVE MG/DL
NITRITE UR QL STRIP.AUTO: NEGATIVE
PH UR: 7 [PH] (ref 5–8)
PROT UR-MCNC: NEGATIVE MG/DL
RBC #/AREA URNS AUTO: 1 /HPF
SP GR UR STRIP: 1.01 (ref 1–1.03)
UROBILINOGEN UR STRIP-ACNC: <2
WBC #/AREA URNS AUTO: 8 /HPF

## 2020-02-26 PROCEDURE — 87086 URINE CULTURE/COLONY COUNT: CPT

## 2020-02-26 PROCEDURE — 81001 URINALYSIS AUTO W/SCOPE: CPT

## 2020-02-26 PROCEDURE — 99214 OFFICE O/P EST MOD 30 MIN: CPT | Performed by: INTERNAL MEDICINE

## 2020-02-26 RX ORDER — ROSUVASTATIN CALCIUM 5 MG/1
5 TABLET, COATED ORAL NIGHTLY
COMMUNITY
Start: 2020-02-03 | End: 2021-03-17

## 2020-02-26 NOTE — PROGRESS NOTES
HPI:    Patient ID: Georgina Benitez is a 62year old female. Patient presents with:  UTI: Pt state that she is f/u from the UC with UTI and it is better.      Patient in office today for  uti sy -follow-up from the immediate care patient states she is d • Levothyroxine Sodium 112 MCG Oral Tab Take 1 tablet (112 mcg total) by mouth before breakfast. 30-60mins before breakfast. 90 tablet 1   • PANTOPRAZOLE SODIUM 40 MG Oral Tab EC Take 1 tablet (40 mg total) by mouth 2 (two) times daily before meals.  In m Psychiatric: She has a normal mood and affect. Her behavior is normal.   Nursing note and vitals reviewed. Blood pressure 139/80, pulse 88, temperature 97.3 °F (36.3 °C), temperature source Oral, resp.  rate 20, height 5' 4\" (1.626 m), weight 190 lb 3

## 2020-03-11 ENCOUNTER — HOSPITAL ENCOUNTER (OUTPATIENT)
Age: 60
Discharge: HOME | End: 2020-03-11
Attending: FAMILY MEDICINE
Payer: COMMERCIAL

## 2020-03-11 VITALS
SYSTOLIC BLOOD PRESSURE: 146 MMHG | WEIGHT: 185 LBS | OXYGEN SATURATION: 99 % | RESPIRATION RATE: 18 BRPM | HEART RATE: 98 BPM | BODY MASS INDEX: 32 KG/M2 | DIASTOLIC BLOOD PRESSURE: 74 MMHG | TEMPERATURE: 98 F

## 2020-03-11 DIAGNOSIS — J06.9 VIRAL UPPER RESPIRATORY TRACT INFECTION: Primary | ICD-10-CM

## 2020-03-11 DIAGNOSIS — H10.33 ACUTE BACTERIAL CONJUNCTIVITIS OF BOTH EYES: ICD-10-CM

## 2020-03-11 PROCEDURE — 99213 OFFICE O/P EST LOW 20 MIN: CPT

## 2020-03-11 PROCEDURE — 99214 OFFICE O/P EST MOD 30 MIN: CPT

## 2020-03-11 RX ORDER — BENZONATATE 100 MG/1
100 CAPSULE ORAL 3 TIMES DAILY PRN
Qty: 30 CAPSULE | Refills: 0 | Status: SHIPPED | OUTPATIENT
Start: 2020-03-11 | End: 2020-04-10

## 2020-03-11 RX ORDER — ALBUTEROL SULFATE 90 UG/1
2 AEROSOL, METERED RESPIRATORY (INHALATION) EVERY 4 HOURS PRN
Qty: 1 INHALER | Refills: 0 | Status: SHIPPED | OUTPATIENT
Start: 2020-03-11 | End: 2020-04-10

## 2020-03-11 RX ORDER — TOBRAMYCIN 3 MG/ML
2 SOLUTION/ DROPS OPHTHALMIC
Qty: 1 BOTTLE | Refills: 0 | Status: SHIPPED | OUTPATIENT
Start: 2020-03-11 | End: 2020-03-18

## 2020-03-11 NOTE — ED PROVIDER NOTES
Patient Seen in: [facility]    History   Patient presents with: Eye Visual Problem    Stated Complaint: eye issue    HPI    Pt complains of right eye redness and discharge for 1 days . Now some redness in the left eye.  Has • Lipids Mother    • Cancer Mother         cervical   • Cancer Paternal Grandmother         breast   • Breast Cancer Paternal Grandmother 48   • Lipids Brother        Family history reviewed with patient/caregiver and is not pertinent to presenting probl conjunctivitis of both eyes    Disposition:  Discharge    [facility]  [phone]          MD Shashi Rose  [phone]    In 5 days        Medications Presc

## 2020-03-16 ENCOUNTER — NURSE TRIAGE (OUTPATIENT)
Dept: INTERNAL MEDICINE CLINIC | Facility: CLINIC | Age: 60
End: 2020-03-16

## 2020-03-16 RX ORDER — AZITHROMYCIN 250 MG/1
TABLET, FILM COATED ORAL
Qty: 6 TABLET | Refills: 0 | Status: SHIPPED | OUTPATIENT
Start: 2020-03-16 | End: 2021-03-17 | Stop reason: ALTCHOICE

## 2020-03-16 RX ORDER — BUDESONIDE AND FORMOTEROL FUMARATE DIHYDRATE 160; 4.5 UG/1; UG/1
2 AEROSOL RESPIRATORY (INHALATION) 2 TIMES DAILY
Qty: 1 INHALER | Refills: 0 | Status: SHIPPED | OUTPATIENT
Start: 2020-03-16 | End: 2021-11-01 | Stop reason: ALTCHOICE

## 2020-03-16 NOTE — TELEPHONE ENCOUNTER
Discussed with patient denies any travel or any exposure to somebody who is diagnosed with: Coronavirus cov 19 it or possibly have  it       Patient denies fever or wheezing  , states has asthma and is having some cough with some worsening with mucus  , bu

## 2020-03-16 NOTE — TELEPHONE ENCOUNTER
Action Requested: Summary for Provider     []  Critical Lab, Recommendations Needed  [] Need Additional Advice  []   FYI    []   Need Orders  [] Need Medications Sent to Pharmacy  []  Other     SUMMARY:  Patient states she was seen in the UC on 3/11, diagn

## 2020-03-18 ENCOUNTER — TELEPHONE (OUTPATIENT)
Dept: INTERNAL MEDICINE CLINIC | Facility: CLINIC | Age: 60
End: 2020-03-18

## 2020-03-18 ENCOUNTER — PATIENT MESSAGE (OUTPATIENT)
Dept: INTERNAL MEDICINE CLINIC | Facility: CLINIC | Age: 60
End: 2020-03-18

## 2020-03-18 RX ORDER — TOBRAMYCIN 3 MG/ML
2 SOLUTION/ DROPS OPHTHALMIC EVERY 6 HOURS
Qty: 1 BOTTLE | Refills: 0 | Status: SHIPPED | OUTPATIENT
Start: 2020-03-18 | End: 2020-03-23

## 2020-03-18 NOTE — TELEPHONE ENCOUNTER
Dr Deb Vargas for Dr Annelise Pina  Patient asking for refill of tobramycin eye drops prescribed in UC on 3/11/20 for return of symptoms, see pending rx

## 2020-03-18 NOTE — TELEPHONE ENCOUNTER
From: Walter Haskins  To: Laney Brown MD  Sent: 3/18/2020 8:45 AM CDT  Subject: Visit Follow-up Question    Dr WOODS--I was seen at [facility] on 3/11/20 for bilateral bacterial conjunctivitis (red eyes, itchy, crusty and gooey secretions).  I fo

## 2020-03-18 NOTE — TELEPHONE ENCOUNTER
Dr WOODS--I was seen at [facility]mediate care on 3/11/20 for bilateral bacterial conjunctivitis (red eyes, itchy, crusty and gooey secretions).  I followed the orders of Tobramycin eye gtts for 5 days and was due to see you on Tuesday 3/17/20 for re-check (appt was ca

## 2020-03-18 NOTE — TELEPHONE ENCOUNTER
Spoke to patient, advised to try ophthalmic drops again and if the eye symptoms not resolving see ophthalmologist, prescription sent to the pharmacy

## 2020-03-23 ENCOUNTER — TELEPHONE (OUTPATIENT)
Dept: OTHER | Age: 60
End: 2020-03-23

## 2020-03-23 ENCOUNTER — TELEPHONE (OUTPATIENT)
Dept: INTERNAL MEDICINE CLINIC | Facility: CLINIC | Age: 60
End: 2020-03-23

## 2020-03-23 ENCOUNTER — TELEPHONE (OUTPATIENT)
Dept: OPHTHALMOLOGY | Facility: CLINIC | Age: 60
End: 2020-03-23

## 2020-03-23 PROCEDURE — 99442 PHONE E/M BY PHYS 11-20 MIN: CPT | Performed by: INTERNAL MEDICINE

## 2020-03-23 NOTE — TELEPHONE ENCOUNTER
Per pt has pink eye, has been treated by PCP twice this month and there hasnt been improvement. Pt asking for appt soon. Please advise thank you.

## 2020-03-23 NOTE — TELEPHONE ENCOUNTER
Virtual/Telephone Check-In    Keshav Fox verbally consents to a Virtual/Telephone Check-In service on 03/23/20. Patient understands and accepts financial responsibility for any deductible, co-insurance and/or co-pays associated with this service.

## 2020-03-23 NOTE — TELEPHONE ENCOUNTER
Spoke with patient and Dr Aj Razo regarding patient care. Advised patient to follow up with PCP per Dr Aj Razo and also to try doing lid hygiene a few times a day to help, advised if wearing CLs to stop wearing them.  Pt states she is almost out of tobramycin

## 2020-03-23 NOTE — TELEPHONE ENCOUNTER
Patient calling does not know what to do. Still with eye infection, swollen eye lids, white of eye pink. Eyes itching no pain. Crusty discharge in morning. Has used 2 bottles of Tobramycin and not improving.   Asking could she have allergy to tobramycin

## 2020-05-24 DIAGNOSIS — K21.9 GASTROESOPHAGEAL REFLUX DISEASE, ESOPHAGITIS PRESENCE NOT SPECIFIED: ICD-10-CM

## 2020-05-26 RX ORDER — PANTOPRAZOLE SODIUM 40 MG/1
40 TABLET, DELAYED RELEASE ORAL NIGHTLY
Qty: 90 TABLET | Refills: 1 | Status: SHIPPED | OUTPATIENT
Start: 2020-05-26 | End: 2020-12-29

## 2020-05-26 RX ORDER — PANTOPRAZOLE SODIUM 40 MG/1
TABLET, DELAYED RELEASE ORAL
Qty: 90 TABLET | Refills: 0 | Status: SHIPPED | OUTPATIENT
Start: 2020-05-26 | End: 2020-09-04

## 2020-06-11 RX ORDER — LEVOTHYROXINE SODIUM 112 UG/1
TABLET ORAL
Qty: 90 TABLET | Refills: 0 | Status: SHIPPED | OUTPATIENT
Start: 2020-06-11 | End: 2020-09-20

## 2020-06-26 RX ORDER — MONTELUKAST SODIUM 10 MG/1
10 TABLET ORAL NIGHTLY
Qty: 30 TABLET | Refills: 0 | OUTPATIENT
Start: 2020-06-26

## 2020-07-08 RX ORDER — MONTELUKAST SODIUM 10 MG/1
10 TABLET ORAL NIGHTLY
Qty: 30 TABLET | Refills: 3 | OUTPATIENT
Start: 2020-07-08

## 2020-07-27 ENCOUNTER — PATIENT MESSAGE (OUTPATIENT)
Dept: INTERNAL MEDICINE CLINIC | Facility: CLINIC | Age: 60
End: 2020-07-27

## 2020-07-27 RX ORDER — ROSUVASTATIN CALCIUM 5 MG/1
5 TABLET, COATED ORAL NIGHTLY
Qty: 90 TABLET | Refills: 0 | Status: SHIPPED | OUTPATIENT
Start: 2020-07-27 | End: 2020-12-20

## 2020-07-27 RX ORDER — MONTELUKAST SODIUM 10 MG/1
10 TABLET ORAL NIGHTLY
Qty: 30 TABLET | Refills: 3 | Status: SHIPPED | OUTPATIENT
Start: 2020-07-27 | End: 2020-11-14

## 2020-07-27 NOTE — TELEPHONE ENCOUNTER
From: Berny Face  To: Shanon Poole MD  Sent: 7/27/2020 7:35 AM CDT  Subject: Prescription Question    I am unable to refill Rovastatin (Crestor) via the refill center on this website. I only have 1 1/2 weeks worth left to this medication.  James Stevens

## 2020-09-22 RX ORDER — LEVOTHYROXINE SODIUM 112 UG/1
112 TABLET ORAL
Qty: 90 TABLET | Refills: 0 | Status: SHIPPED | OUTPATIENT
Start: 2020-09-22 | End: 2020-12-22

## 2020-10-24 ENCOUNTER — MED REC SCAN ONLY (OUTPATIENT)
Dept: INTERNAL MEDICINE CLINIC | Facility: CLINIC | Age: 60
End: 2020-10-24

## 2020-11-14 RX ORDER — MONTELUKAST SODIUM 10 MG/1
10 TABLET ORAL NIGHTLY
Qty: 30 TABLET | Refills: 3 | Status: SHIPPED | OUTPATIENT
Start: 2020-11-14 | End: 2021-03-29

## 2020-12-22 RX ORDER — LEVOTHYROXINE SODIUM 112 UG/1
112 TABLET ORAL
Qty: 90 TABLET | Refills: 0 | Status: SHIPPED | OUTPATIENT
Start: 2020-12-22 | End: 2021-05-26 | Stop reason: DRUGHIGH

## 2020-12-25 RX ORDER — LEVOTHYROXINE SODIUM 112 UG/1
112 TABLET ORAL
Qty: 30 TABLET | Refills: 0 | Status: SHIPPED | OUTPATIENT
Start: 2020-12-25 | End: 2021-03-17

## 2020-12-25 RX ORDER — ROSUVASTATIN CALCIUM 5 MG/1
5 TABLET, COATED ORAL NIGHTLY
Qty: 30 TABLET | Refills: 0 | Status: SHIPPED | OUTPATIENT
Start: 2020-12-25 | End: 2021-01-24

## 2020-12-27 DIAGNOSIS — K21.9 GASTROESOPHAGEAL REFLUX DISEASE: ICD-10-CM

## 2020-12-29 RX ORDER — PANTOPRAZOLE SODIUM 40 MG/1
40 TABLET, DELAYED RELEASE ORAL NIGHTLY
Qty: 90 TABLET | Refills: 0 | Status: SHIPPED | OUTPATIENT
Start: 2020-12-29 | End: 2021-03-17

## 2021-01-24 RX ORDER — ROSUVASTATIN CALCIUM 5 MG/1
5 TABLET, COATED ORAL NIGHTLY
Qty: 15 TABLET | Refills: 0 | Status: SHIPPED | OUTPATIENT
Start: 2021-01-24 | End: 2021-03-17

## 2021-01-25 NOTE — TELEPHONE ENCOUNTER
Call center please call and schedule an appointment. Thank You. Mychart message sent to the patient.

## 2021-01-27 NOTE — TELEPHONE ENCOUNTER
Patient called and advised she is Hesitant to come into the office at the moment. She would like to know if a Virtual visit will be OK for her to come in? She advised she is due for her 2nd Covid Shot soon, so she might feel a little more comfortable coming once that is done. Please Advise.

## 2021-02-10 RX ORDER — ROSUVASTATIN CALCIUM 5 MG/1
5 TABLET, COATED ORAL NIGHTLY
Qty: 15 TABLET | Refills: 0 | OUTPATIENT
Start: 2021-02-10 | End: 2021-08-09

## 2021-02-17 ENCOUNTER — LAB ENCOUNTER (OUTPATIENT)
Dept: LAB | Age: 61
End: 2021-02-17
Attending: INTERNAL MEDICINE
Payer: COMMERCIAL

## 2021-02-17 DIAGNOSIS — E03.8 HYPOTHYROIDISM DUE TO HASHIMOTO'S THYROIDITIS: ICD-10-CM

## 2021-02-17 DIAGNOSIS — E78.00 PURE HYPERCHOLESTEROLEMIA: ICD-10-CM

## 2021-02-17 DIAGNOSIS — E06.3 HYPOTHYROIDISM DUE TO HASHIMOTO'S THYROIDITIS: ICD-10-CM

## 2021-02-17 LAB
ALBUMIN SERPL-MCNC: 3.7 G/DL (ref 3.4–5)
ALBUMIN/GLOB SERPL: 0.9 {RATIO} (ref 1–2)
ALP LIVER SERPL-CCNC: 52 U/L
ALT SERPL-CCNC: 32 U/L
ANION GAP SERPL CALC-SCNC: 3 MMOL/L (ref 0–18)
AST SERPL-CCNC: 17 U/L (ref 15–37)
BASOPHILS # BLD AUTO: 0.05 X10(3) UL (ref 0–0.2)
BASOPHILS NFR BLD AUTO: 0.7 %
BILIRUB SERPL-MCNC: 1 MG/DL (ref 0.1–2)
BUN BLD-MCNC: 13 MG/DL (ref 7–18)
BUN/CREAT SERPL: 14.4 (ref 10–20)
CALCIUM BLD-MCNC: 9.7 MG/DL (ref 8.5–10.1)
CHLORIDE SERPL-SCNC: 104 MMOL/L (ref 98–112)
CHOLEST SMN-MCNC: 205 MG/DL (ref ?–200)
CO2 SERPL-SCNC: 32 MMOL/L (ref 21–32)
CREAT BLD-MCNC: 0.9 MG/DL
DEPRECATED RDW RBC AUTO: 41.9 FL (ref 35.1–46.3)
EOSINOPHIL # BLD AUTO: 0.14 X10(3) UL (ref 0–0.7)
EOSINOPHIL NFR BLD AUTO: 1.9 %
ERYTHROCYTE [DISTWIDTH] IN BLOOD BY AUTOMATED COUNT: 12.1 % (ref 11–15)
GLOBULIN PLAS-MCNC: 4.3 G/DL (ref 2.8–4.4)
GLUCOSE BLD-MCNC: 115 MG/DL (ref 70–99)
HCT VFR BLD AUTO: 41.5 %
HDLC SERPL-MCNC: 43 MG/DL (ref 40–59)
HGB BLD-MCNC: 13.4 G/DL
IMM GRANULOCYTES # BLD AUTO: 0.01 X10(3) UL (ref 0–1)
IMM GRANULOCYTES NFR BLD: 0.1 %
LDLC SERPL CALC-MCNC: 133 MG/DL (ref ?–100)
LYMPHOCYTES # BLD AUTO: 3.25 X10(3) UL (ref 1–4)
LYMPHOCYTES NFR BLD AUTO: 45.3 %
M PROTEIN MFR SERPL ELPH: 8 G/DL (ref 6.4–8.2)
MCH RBC QN AUTO: 30.1 PG (ref 26–34)
MCHC RBC AUTO-ENTMCNC: 32.3 G/DL (ref 31–37)
MCV RBC AUTO: 93.3 FL
MONOCYTES # BLD AUTO: 0.44 X10(3) UL (ref 0.1–1)
MONOCYTES NFR BLD AUTO: 6.1 %
NEUTROPHILS # BLD AUTO: 3.29 X10 (3) UL (ref 1.5–7.7)
NEUTROPHILS # BLD AUTO: 3.29 X10(3) UL (ref 1.5–7.7)
NEUTROPHILS NFR BLD AUTO: 45.9 %
NONHDLC SERPL-MCNC: 162 MG/DL (ref ?–130)
OSMOLALITY SERPL CALC.SUM OF ELEC: 289 MOSM/KG (ref 275–295)
PATIENT FASTING Y/N/NP: YES
PATIENT FASTING Y/N/NP: YES
PLATELET # BLD AUTO: 315 10(3)UL (ref 150–450)
POTASSIUM SERPL-SCNC: 4 MMOL/L (ref 3.5–5.1)
RBC # BLD AUTO: 4.45 X10(6)UL
SODIUM SERPL-SCNC: 139 MMOL/L (ref 136–145)
T4 FREE SERPL-MCNC: 1 NG/DL (ref 0.8–1.7)
TRIGL SERPL-MCNC: 147 MG/DL (ref 30–149)
TSI SER-ACNC: 7.17 MIU/ML (ref 0.36–3.74)
VLDLC SERPL CALC-MCNC: 29 MG/DL (ref 0–30)
WBC # BLD AUTO: 7.2 X10(3) UL (ref 4–11)

## 2021-02-17 PROCEDURE — 84443 ASSAY THYROID STIM HORMONE: CPT

## 2021-02-17 PROCEDURE — 80061 LIPID PANEL: CPT

## 2021-02-17 PROCEDURE — 36415 COLL VENOUS BLD VENIPUNCTURE: CPT

## 2021-02-17 PROCEDURE — 85025 COMPLETE CBC W/AUTO DIFF WBC: CPT

## 2021-02-17 PROCEDURE — 84439 ASSAY OF FREE THYROXINE: CPT

## 2021-02-17 PROCEDURE — 80053 COMPREHEN METABOLIC PANEL: CPT

## 2021-03-02 NOTE — PROGRESS NOTES
Patient viewed on 2/27/21.      Viewed by Clara Granda on 2/27/2021  9:35 PM  Written by Daria Lafleur MD on 2/22/2021  8:32 AM   Dear Katie Carrizales,     Your blood test results are showing       Kidney and liver function are normal   No anemia   Choles

## 2021-03-11 ENCOUNTER — OFFICE VISIT (OUTPATIENT)
Dept: OTOLARYNGOLOGY | Facility: CLINIC | Age: 61
End: 2021-03-11
Payer: COMMERCIAL

## 2021-03-11 VITALS
HEIGHT: 64 IN | SYSTOLIC BLOOD PRESSURE: 134 MMHG | TEMPERATURE: 98 F | WEIGHT: 185 LBS | BODY MASS INDEX: 31.58 KG/M2 | DIASTOLIC BLOOD PRESSURE: 85 MMHG

## 2021-03-11 DIAGNOSIS — J34.89 SINUS PRESSURE: ICD-10-CM

## 2021-03-11 DIAGNOSIS — H61.21 IMPACTED CERUMEN OF RIGHT EAR: ICD-10-CM

## 2021-03-11 DIAGNOSIS — R49.0 HOARSENESS: Primary | ICD-10-CM

## 2021-03-11 PROCEDURE — 3008F BODY MASS INDEX DOCD: CPT | Performed by: OTOLARYNGOLOGY

## 2021-03-11 PROCEDURE — 3075F SYST BP GE 130 - 139MM HG: CPT | Performed by: OTOLARYNGOLOGY

## 2021-03-11 PROCEDURE — 99213 OFFICE O/P EST LOW 20 MIN: CPT | Performed by: OTOLARYNGOLOGY

## 2021-03-11 PROCEDURE — 3079F DIAST BP 80-89 MM HG: CPT | Performed by: OTOLARYNGOLOGY

## 2021-03-11 PROCEDURE — 69210 REMOVE IMPACTED EAR WAX UNI: CPT | Performed by: OTOLARYNGOLOGY

## 2021-03-11 RX ORDER — MONTELUKAST SODIUM 10 MG/1
10 TABLET ORAL NIGHTLY
Qty: 90 TABLET | Refills: 3 | Status: SHIPPED | OUTPATIENT
Start: 2021-03-11 | End: 2021-03-17

## 2021-03-11 RX ORDER — AZELASTINE 1 MG/ML
2 SPRAY, METERED NASAL 2 TIMES DAILY
Qty: 1 BOTTLE | Refills: 3 | Status: SHIPPED | OUTPATIENT
Start: 2021-03-11 | End: 2021-11-01 | Stop reason: ALTCHOICE

## 2021-03-11 NOTE — PROGRESS NOTES
eMlisa Barragan is a 56year old female.   Patient presents with:  Sinus Problem: c/o sinus congestion, clogged ear for  a while      HISTORY OF PRESENT ILLNESS    She presents with a history of US of the thyroid for presumed thyroid enlargement with nor day to once a day. She has only been able to tolerate Loratadine-D once a day as it does cause her to have palpitations at times. No other signs, symptoms or complaints.   Overall she feels that she is at least 50% better than the last time I saw her    Attends Club or Organization Meetings:       Marital Status:   Intimate Partner Violence:       Fear of Current or Ex-Partner:       Emotionally Abused:       Physically Abused:       Sexually Abused:     Family History   Problem Relation Age of Onset   • Palpation - Normal. Parotid gland - Normal. Thyroid gland - Normal.   Eyes Normal Conjunctiva - Right: Normal, Left: Normal. Pupil - Right: Normal, Left: Normal. Fundus - Right: Normal, Left: Normal.   Neurological Normal Memory - Normal. Cranial nerves - MINUTES BEFORE BREAKFAST, Disp: 30 tablet, Rfl: 0  •  LEVOTHYROXINE SODIUM 112 MCG Oral Tab, Take 1 tablet (112 mcg total) by mouth every morning before breakfast. 30-60 MINUTES BEFORE BREAKFAST, Disp: 90 tablet, Rfl: 0  •  MONTELUKAST SODIUM 10 MG Oral Ta

## 2021-03-12 ENCOUNTER — ORDER TRANSCRIPTION (OUTPATIENT)
Dept: ADMINISTRATIVE | Facility: HOSPITAL | Age: 61
End: 2021-03-12

## 2021-03-12 ENCOUNTER — TELEPHONE (OUTPATIENT)
Dept: GASTROENTEROLOGY | Facility: CLINIC | Age: 61
End: 2021-03-12

## 2021-03-12 DIAGNOSIS — Z12.31 VISIT FOR SCREENING MAMMOGRAM: Primary | ICD-10-CM

## 2021-03-15 ENCOUNTER — HOSPITAL ENCOUNTER (OUTPATIENT)
Dept: MAMMOGRAPHY | Age: 61
Discharge: HOME | End: 2021-03-15
Attending: INTERNAL MEDICINE
Payer: COMMERCIAL

## 2021-03-15 DIAGNOSIS — Z12.31 VISIT FOR SCREENING MAMMOGRAM: ICD-10-CM

## 2021-03-15 PROCEDURE — 77067 SCR MAMMO BI INCL CAD: CPT | Performed by: INTERNAL MEDICINE

## 2021-03-15 PROCEDURE — 77063 BREAST TOMOSYNTHESIS BI: CPT | Performed by: INTERNAL MEDICINE

## 2021-03-16 NOTE — TELEPHONE ENCOUNTER
[facility]  Please review patient's chart & provide prep/orders if appropriate. Please see below note regarding inability to retrieve last colon report. Will continue to check [facility] to be back up and running to obtain. Anticoagulants: no    Diabetic Meds: no    BP meds(Ace inhibitors/ARB's):no    Weight loss meds (phentermine/vyvanse): no    Iron supplement (RX/OTC): no    Height & Weight/BMI: 5'4\"/200 lb/BMI 34.3    Hx of Cardiac/CVA issues/(MI/Stroke): no    Devices Pacemaker/Defibrillator/Stents: no    Resp.  Issues/Oxygen Use/ELINOR/COPD: No    Issues w/Anesthesia: no    Symptoms (Y/N): no

## 2021-03-16 NOTE — TELEPHONE ENCOUNTER
Nursing: Since we are waiting to check on [employer], can we double check the details of her last colonoscopy and forward the encounter back to me once we have done so? Thank you!

## 2021-03-16 NOTE — TELEPHONE ENCOUNTER
Unable to obtain medical records from last colonoscopy at this time. Last colon was performed 11/11/2019. [employer] system is currently down. I called [address] [place] [address] medical records department, but due to 10 year retention, they were unable to assist in obtaining patient's records from 2009. I will obtain last colon pathology/operative report ASAP. LMTCB on patient's phone.

## 2021-03-16 NOTE — TELEPHONE ENCOUNTER
Madison Ch: Yes, of course! Will route back when operative report and pathology available from previous procedure.

## 2021-03-17 ENCOUNTER — OFFICE VISIT (OUTPATIENT)
Dept: INTERNAL MEDICINE CLINIC | Facility: CLINIC | Age: 61
End: 2021-03-17
Payer: COMMERCIAL

## 2021-03-17 VITALS
SYSTOLIC BLOOD PRESSURE: 116 MMHG | WEIGHT: 205 LBS | HEIGHT: 64 IN | HEART RATE: 86 BPM | DIASTOLIC BLOOD PRESSURE: 79 MMHG | BODY MASS INDEX: 35 KG/M2

## 2021-03-17 DIAGNOSIS — E03.8 HYPOTHYROIDISM DUE TO HASHIMOTO'S THYROIDITIS: ICD-10-CM

## 2021-03-17 DIAGNOSIS — D22.9 NUMEROUS SKIN MOLES: ICD-10-CM

## 2021-03-17 DIAGNOSIS — Z00.00 PE (PHYSICAL EXAM), ROUTINE: ICD-10-CM

## 2021-03-17 DIAGNOSIS — E06.3 HYPOTHYROIDISM DUE TO HASHIMOTO'S THYROIDITIS: ICD-10-CM

## 2021-03-17 DIAGNOSIS — K21.9 GASTROESOPHAGEAL REFLUX DISEASE: ICD-10-CM

## 2021-03-17 DIAGNOSIS — Z12.31 SCREENING MAMMOGRAM, ENCOUNTER FOR: Primary | ICD-10-CM

## 2021-03-17 DIAGNOSIS — Z13.820 SCREENING FOR OSTEOPOROSIS: ICD-10-CM

## 2021-03-17 PROCEDURE — 3074F SYST BP LT 130 MM HG: CPT | Performed by: INTERNAL MEDICINE

## 2021-03-17 PROCEDURE — 3078F DIAST BP <80 MM HG: CPT | Performed by: INTERNAL MEDICINE

## 2021-03-17 PROCEDURE — 99396 PREV VISIT EST AGE 40-64: CPT | Performed by: INTERNAL MEDICINE

## 2021-03-17 PROCEDURE — 99213 OFFICE O/P EST LOW 20 MIN: CPT | Performed by: INTERNAL MEDICINE

## 2021-03-17 PROCEDURE — 3008F BODY MASS INDEX DOCD: CPT | Performed by: INTERNAL MEDICINE

## 2021-03-17 RX ORDER — LEVOTHYROXINE SODIUM 112 UG/1
112 TABLET ORAL
Qty: 90 TABLET | Refills: 1 | Status: CANCELLED | OUTPATIENT
Start: 2021-03-17

## 2021-03-17 RX ORDER — PANTOPRAZOLE SODIUM 40 MG/1
40 TABLET, DELAYED RELEASE ORAL NIGHTLY
Qty: 90 TABLET | Refills: 3 | Status: SHIPPED | OUTPATIENT
Start: 2021-03-17

## 2021-03-17 RX ORDER — LORATADINE 10 MG/1
10 TABLET ORAL AS NEEDED
COMMUNITY

## 2021-03-17 RX ORDER — ROSUVASTATIN CALCIUM 5 MG/1
5 TABLET, COATED ORAL NIGHTLY
Qty: 90 TABLET | Refills: 3 | Status: SHIPPED | OUTPATIENT
Start: 2021-03-17

## 2021-03-17 RX ORDER — LEVOTHYROXINE SODIUM 0.12 MG/1
125 TABLET ORAL
Qty: 90 TABLET | Refills: 0 | Status: SHIPPED | OUTPATIENT
Start: 2021-03-17 | End: 2021-06-14

## 2021-03-17 NOTE — PROGRESS NOTES
HPI:    Patient ID: Joann Canseco is a 56year old female. Patient presents with:  Physical: Recent labs completed on 2/17/21      Patient presents today for physical exam, states doing well otherwise, denies chest pain, shortness of breath, dyspnea on Musculoskeletal: Negative for myalgias. Skin: Negative for pallor. Neurological: Negative for dizziness, focal weakness and headaches. Psychiatric/Behavioral: Negative for confusion. The patient is not nervous/anxious.              Current Outpatien Rhythm: Normal rate and regular rhythm. Heart sounds: Normal heart sounds. No murmur heard. Pulmonary:      Effort: Pulmonary effort is normal. No respiratory distress. Breath sounds: Normal breath sounds. No wheezing or rales.    Abdominal: stomach and 30-60 minutes before dinner   side effects and directions of medication discussed with patient. Patient verbalized understanding and compliance. - Pantoprazole Sodium (PROTONIX) 40 MG Oral Tab EC;  Take 1 tablet (40 mg total) by mouth 2 (two

## 2021-03-23 NOTE — TELEPHONE ENCOUNTER
Pt: MYRTLECORYENRICO CHAVEZ       Acct: [de-identified]                               Rpt: [id number]    Patient Room/Bed:   --------------------------------------------------------------------------------------------------------------    Patient:  Maxime LABOYHELEN #:  [id number]  Room:PRATIMA Dc MD #:  [id number]           DATE OF SURGERY: 11/11/2009     PREOPERATIVE DIAGNOSIS: Colorectal cancer screening. POSTOPERATIVE DIAGNOSIS: Normal colonoscopy. PROCEDURE: Screening colonoscopy. SURGEON: Dr. Rick Sutherland. ANESTHESIA:  1. Fentanyl 75 mcg intravenous. 2. Versed 5 mg intravenous in divided doses. HISTORY: [age]-year-old female who presents for screening colonoscopy. She has had no gastrointestinal tract symptoms, signs, or family history. TECHNIQUE: The patient placed in the left lateral recumbent position. Digital rectal examination revealed no intraluminal abnormalities. Olympus  variable stiffness 180 series colonoscopy inserted into the rectum and  advanced to the splenic flexure. The patient was made supine. The  instrument advanced to the cecum, ileocecal valve and into the terminal  ileum. The colon was examined upon withdrawal in the supine position. ENDOSCOPIC FINDINGS: Preparation of the colon was good. The terminal ileum  mucosa was normal.  The ileocecal valve well preserved. The colonic  haustrations were distinct. The mucosa from the cecum to the anal verge  was normal with an intact vascular pattern. No colonic polyps, definite  diverticula, mass lesions or vascular anomalies were seen. Retroflexion of  the rectum revealed no abnormalities. The patient tolerated this procedure  well without immediate complication. IMPRESSION: Normal colonoscopy to the terminal ileum. RECOMMENDATIONS: In the absence of any new symptoms or signs, repeat  colonoscopy in 10 years.               D:    11/11/2009 8:14 A  T:    11/11/2009  9:37 A  ATTENDING:  Leonard Mireles MD Josh 0800  DICTATING:    Clarisse Escamilla MD 0800  MD ID #:      [id number]  cc:   Marks D [phone]        Clarisse Escamilla MD 0800    ================================

## 2021-03-23 NOTE — TELEPHONE ENCOUNTER
Pt states PCP is requesting for pt to see Dr. Tony Nino first before scheduling CLN due to heartburn.  Pt scheduled appt for 5/26/21 at 2:30pm.

## 2021-03-29 RX ORDER — MONTELUKAST SODIUM 10 MG/1
10 TABLET ORAL NIGHTLY
Qty: 30 TABLET | Refills: 0 | Status: SHIPPED | OUTPATIENT
Start: 2021-03-29

## 2021-04-15 ENCOUNTER — OFFICE VISIT (OUTPATIENT)
Dept: OTOLARYNGOLOGY | Facility: CLINIC | Age: 61
End: 2021-04-15
Payer: COMMERCIAL

## 2021-04-15 VITALS
HEIGHT: 64 IN | DIASTOLIC BLOOD PRESSURE: 74 MMHG | TEMPERATURE: 98 F | WEIGHT: 205 LBS | SYSTOLIC BLOOD PRESSURE: 117 MMHG | BODY MASS INDEX: 35 KG/M2

## 2021-04-15 DIAGNOSIS — R49.0 HOARSENESS: Primary | ICD-10-CM

## 2021-04-15 DIAGNOSIS — J34.89 SINUS PRESSURE: ICD-10-CM

## 2021-04-15 PROCEDURE — 99213 OFFICE O/P EST LOW 20 MIN: CPT | Performed by: OTOLARYNGOLOGY

## 2021-04-15 PROCEDURE — 3008F BODY MASS INDEX DOCD: CPT | Performed by: OTOLARYNGOLOGY

## 2021-04-15 PROCEDURE — 3074F SYST BP LT 130 MM HG: CPT | Performed by: OTOLARYNGOLOGY

## 2021-04-15 PROCEDURE — 3078F DIAST BP <80 MM HG: CPT | Performed by: OTOLARYNGOLOGY

## 2021-04-16 NOTE — PROGRESS NOTES
Amarilis Manzo is a 56year old female. Patient presents with:   Follow - Up: 1 month follow up- sinus pressure- per pt there is some changes/improvement of symptoms      HISTORY OF PRESENT ILLNESS  She presents with a history of US of the thyroid for to get off of her pantoprazole twice a day to once a day.  She has only been able to tolerate Loratadine-D once a day as it does cause her to have palpitations at times.  No other signs, symptoms or complaints.  Overall she feels that she is at least 50% b Hypercholesterolemia    • Hyperlipidemia    • Hypothyroidism    • Lump, breast    • Mitral valve prolapse        Past Surgical History:   Procedure Laterality Date   • ADENOIDECTOMY     • COLONOSCOPY     • ENDOMETRIAL BIOPSY - JAR(S): 2  2013   • [facility] LOCAL [place] - Right: Normal, Left: Normal. TM - Right: Normal, Left: Normal.   Skin Normal Inspection - Normal.        Lymph Detail Normal Submental. Submandibular. Anterior cervical. Posterior cervical. Supraclavicular.         Nose/Mouth/Throat Normal External corrected. While every attempt is made to correct errors during dictation discrepancies may still exist    Nino Zhou MD    4/15/2021    8:25 PM

## 2021-05-02 ENCOUNTER — HOSPITAL ENCOUNTER (OUTPATIENT)
Age: 61
Discharge: HOME | End: 2021-05-02
Payer: COMMERCIAL

## 2021-05-02 ENCOUNTER — APPOINTMENT (OUTPATIENT)
Dept: GENERAL RADIOLOGY | Age: 61
End: 2021-05-02
Attending: NURSE PRACTITIONER
Payer: COMMERCIAL

## 2021-05-02 VITALS
RESPIRATION RATE: 18 BRPM | TEMPERATURE: 98 F | DIASTOLIC BLOOD PRESSURE: 71 MMHG | SYSTOLIC BLOOD PRESSURE: 156 MMHG | HEART RATE: 85 BPM | OXYGEN SATURATION: 97 %

## 2021-05-02 DIAGNOSIS — S83.92XA SPRAIN OF LEFT KNEE, UNSPECIFIED LIGAMENT, INITIAL ENCOUNTER: Primary | ICD-10-CM

## 2021-05-02 PROCEDURE — 99213 OFFICE O/P EST LOW 20 MIN: CPT

## 2021-05-02 PROCEDURE — 73560 X-RAY EXAM OF KNEE 1 OR 2: CPT | Performed by: NURSE PRACTITIONER

## 2021-05-02 NOTE — ED PROVIDER NOTES
Patient presents with:  Leg or Foot Injury      HPI:     Naresh Wooten is a 56year old female who presents today with a chief complaint of pain in the left knee.   The patient has been having intermittent discomfort in the left knee and her daughter i  Service: Not Asked        Blood Transfusions: Not Asked        Caffeine Concern: Yes          soda, 8oz daily        Occupational Exposure: Not Asked        Hobby Hazards: Not Asked        Sleep Concern: Not Asked        Stress Concern: Not Asked intact:  Yes  Normal sensation: Yes  Normal capillary refill: Yes  ROM:  pain to the left knee with ambulation and flexion/extension. No deformity. No calf redness, pain, or swelling. Bearing weight with a limp.   Point Tenderness: No     Physical Exam:  BP initial encounter  S83. [id number]        All results reviewed and discussed with patient. See AVS for detailed discharge instructions for your condition today.     Follow Up with:  Callie Mauro MD  [address] [id number] [phone]    Schedule

## 2021-05-02 NOTE — ED INITIAL ASSESSMENT (HPI)
Patient presents with pain on left knee due to injury. Pt states 3year old family member run into her knee.

## 2021-05-04 ENCOUNTER — OFFICE VISIT (OUTPATIENT)
Dept: ORTHOPEDICS CLINIC | Facility: CLINIC | Age: 61
End: 2021-05-04
Payer: COMMERCIAL

## 2021-05-04 VITALS — HEART RATE: 76 BPM | SYSTOLIC BLOOD PRESSURE: 125 MMHG | DIASTOLIC BLOOD PRESSURE: 78 MMHG

## 2021-05-04 DIAGNOSIS — E66.01 CLASS 2 SEVERE OBESITY DUE TO EXCESS CALORIES WITH SERIOUS COMORBIDITY AND BODY MASS INDEX (BMI) OF 35.0 TO 35.9 IN ADULT (HCC): ICD-10-CM

## 2021-05-04 DIAGNOSIS — M23.92 ACUTE INTERNAL DERANGEMENT OF LEFT KNEE: Primary | ICD-10-CM

## 2021-05-04 PROCEDURE — 3078F DIAST BP <80 MM HG: CPT | Performed by: ORTHOPAEDIC SURGERY

## 2021-05-04 PROCEDURE — 99244 OFF/OP CNSLTJ NEW/EST MOD 40: CPT | Performed by: ORTHOPAEDIC SURGERY

## 2021-05-04 PROCEDURE — 3074F SYST BP LT 130 MM HG: CPT | Performed by: ORTHOPAEDIC SURGERY

## 2021-05-04 PROCEDURE — 20610 DRAIN/INJ JOINT/BURSA W/O US: CPT | Performed by: ORTHOPAEDIC SURGERY

## 2021-05-04 RX ORDER — TRIAMCINOLONE ACETONIDE 40 MG/ML
40 INJECTION, SUSPENSION INTRA-ARTICULAR; INTRAMUSCULAR ONCE
Status: COMPLETED | OUTPATIENT
Start: 2021-05-04 | End: 2021-05-04

## 2021-05-04 RX ADMIN — TRIAMCINOLONE ACETONIDE 40 MG: 40 INJECTION, SUSPENSION INTRA-ARTICULAR; INTRAMUSCULAR at 15:57:00

## 2021-05-04 NOTE — H&P
NURSING INTAKE COMMENTS: Patient presents with:  Knee Pain: left knee pain for 3 weeks- feels worse for that  last 2 days- does not recall how it started hurting- denies any injuries besides grandson running into the knee- pain at this visit 5/10- pain is LEVOTHYROXINE SODIUM 112 MCG Oral Tab Take 1 tablet (112 mcg total) by mouth every morning before breakfast. 30-60 MINUTES BEFORE BREAKFAST (Patient not taking: Reported on 4/15/2021 ) 90 tablet 0   • Budesonide-Formoterol Fumarate (SYMBICORT) 160-4.5 MCG/ Constitutional: appears well hydrated, alert and responsive, no acute distress noted  Extremities: The left thigh knee and leg were symmetric to the right without swelling or asymmetric warmth. There was no effusion. Calves were soft and nontender.   Mu index (BMI) of 35.0 to 35.9 in adult [facility])        Assessment: Left knee internal derangement x3 weeks. Plan: I recommended aspiration and injection of the Baker's cyst and knee. Aspiration of the Baker's cyst yielded a drop of thick normal joint fluid.

## 2021-05-05 ENCOUNTER — HOSPITAL ENCOUNTER (OUTPATIENT)
Dept: BONE DENSITY | Age: 61
Discharge: HOME | End: 2021-05-05
Attending: INTERNAL MEDICINE
Payer: COMMERCIAL

## 2021-05-05 ENCOUNTER — LAB ENCOUNTER (OUTPATIENT)
Dept: LAB | Age: 61
End: 2021-05-05
Attending: INTERNAL MEDICINE
Payer: COMMERCIAL

## 2021-05-05 DIAGNOSIS — Z13.820 SCREENING FOR OSTEOPOROSIS: ICD-10-CM

## 2021-05-05 DIAGNOSIS — E06.3 HYPOTHYROIDISM DUE TO HASHIMOTO'S THYROIDITIS: ICD-10-CM

## 2021-05-05 DIAGNOSIS — E03.8 HYPOTHYROIDISM DUE TO HASHIMOTO'S THYROIDITIS: ICD-10-CM

## 2021-05-05 PROCEDURE — 36415 COLL VENOUS BLD VENIPUNCTURE: CPT

## 2021-05-05 PROCEDURE — 84443 ASSAY THYROID STIM HORMONE: CPT

## 2021-05-25 ENCOUNTER — HOSPITAL ENCOUNTER (EMERGENCY)
Facility: HOSPITAL | Age: 61
Discharge: HOME | End: 2021-05-25
Attending: EMERGENCY MEDICINE
Payer: COMMERCIAL

## 2021-05-25 ENCOUNTER — APPOINTMENT (OUTPATIENT)
Dept: GENERAL RADIOLOGY | Facility: HOSPITAL | Age: 61
End: 2021-05-25
Attending: EMERGENCY MEDICINE
Payer: COMMERCIAL

## 2021-05-25 VITALS
BODY MASS INDEX: 34.15 KG/M2 | OXYGEN SATURATION: 94 % | SYSTOLIC BLOOD PRESSURE: 129 MMHG | WEIGHT: 200 LBS | DIASTOLIC BLOOD PRESSURE: 73 MMHG | TEMPERATURE: 99 F | HEART RATE: 79 BPM | HEIGHT: 64 IN | RESPIRATION RATE: 17 BRPM

## 2021-05-25 DIAGNOSIS — F43.0 ACUTE STRESS REACTION: ICD-10-CM

## 2021-05-25 DIAGNOSIS — R91.1 LUNG NODULE: ICD-10-CM

## 2021-05-25 DIAGNOSIS — R00.2 PALPITATIONS: Primary | ICD-10-CM

## 2021-05-25 DIAGNOSIS — I49.3 PVC (PREMATURE VENTRICULAR CONTRACTION): ICD-10-CM

## 2021-05-25 PROCEDURE — 83735 ASSAY OF MAGNESIUM: CPT | Performed by: EMERGENCY MEDICINE

## 2021-05-25 PROCEDURE — 85025 COMPLETE CBC W/AUTO DIFF WBC: CPT

## 2021-05-25 PROCEDURE — 84484 ASSAY OF TROPONIN QUANT: CPT

## 2021-05-25 PROCEDURE — 36415 COLL VENOUS BLD VENIPUNCTURE: CPT

## 2021-05-25 PROCEDURE — 85379 FIBRIN DEGRADATION QUANT: CPT | Performed by: EMERGENCY MEDICINE

## 2021-05-25 PROCEDURE — 80048 BASIC METABOLIC PNL TOTAL CA: CPT | Performed by: EMERGENCY MEDICINE

## 2021-05-25 PROCEDURE — 71045 X-RAY EXAM CHEST 1 VIEW: CPT | Performed by: EMERGENCY MEDICINE

## 2021-05-25 PROCEDURE — 93010 ELECTROCARDIOGRAM REPORT: CPT | Performed by: EMERGENCY MEDICINE

## 2021-05-25 PROCEDURE — 84484 ASSAY OF TROPONIN QUANT: CPT | Performed by: EMERGENCY MEDICINE

## 2021-05-25 PROCEDURE — 93005 ELECTROCARDIOGRAM TRACING: CPT

## 2021-05-25 PROCEDURE — 84443 ASSAY THYROID STIM HORMONE: CPT | Performed by: EMERGENCY MEDICINE

## 2021-05-25 PROCEDURE — 99284 EMERGENCY DEPT VISIT MOD MDM: CPT

## 2021-05-25 PROCEDURE — 85025 COMPLETE CBC W/AUTO DIFF WBC: CPT | Performed by: EMERGENCY MEDICINE

## 2021-05-25 PROCEDURE — 80048 BASIC METABOLIC PNL TOTAL CA: CPT

## 2021-05-25 RX ORDER — POTASSIUM CHLORIDE 20 MEQ/1
40 TABLET, EXTENDED RELEASE ORAL ONCE
Status: COMPLETED | OUTPATIENT
Start: 2021-05-25 | End: 2021-05-25

## 2021-05-25 RX ORDER — DIAZEPAM 2 MG/1
2 TABLET ORAL 3 TIMES DAILY PRN
Qty: 15 TABLET | Refills: 0 | Status: SHIPPED | OUTPATIENT
Start: 2021-05-25 | End: 2021-05-30

## 2021-05-25 RX ORDER — DIAZEPAM 5 MG/1
5 TABLET ORAL ONCE
Status: COMPLETED | OUTPATIENT
Start: 2021-05-25 | End: 2021-05-25

## 2021-05-25 NOTE — ED PROVIDER NOTES
Patient Seen in: [facility] Emergency Department    History   Patient presents with:  Chest Pain Angina    Stated Complaint: cp     HPI    27-year-old female with past medical history of GERD, dyslipidemia, hypothyroid presenting for evaluation of a tablet (112 mcg total) by mouth every morning before breakfast. 30-60 MINUTES BEFORE BREAKFAST  Patient not taking: Reported on 4/15/2021    Budesonide-Formoterol Fumarate (SYMBICORT) 160-4.5 MCG/ACT Inhalation Aerosol,  Inhale 2 puffs into the lungs 2 (tw deformity. Neurological: Alert. CN II-XII grossly intact. BUE/BLE proximally and distally with 5/5 strength. Skin: Skin is warm. Psychiatric: Cooperative. Nursing note and vitals reviewed.         ED Course     Labs Reviewed   BASIC METABOLIC PANEL (8) No significant pulmonary parenchymal abnormalities. No effusion or pleural thickening. BONES: No fracture or visible bony lesion. OTHER: Negative. CONCLUSION:  1. No acute airspace disease.  2. Questionable indeterminate 4.6 mm size nodule right l personal hand/facial/oropharyngeal contact and gloves worn throughout encounter. See note and/or contact this provider for further PPE details.     We recommend that you schedule follow up care with a primary care provider within the next three months to ob

## 2021-05-25 NOTE — ED INITIAL ASSESSMENT (HPI)
Pt presents to ER for palpitations withnShortenss of breath, with dizziness going on for a week. Per pt she is on a lot of stress lately. Denies N/V/D, denies fever. Pt is A/O x 4, breathing is unlabored.

## 2021-05-26 ENCOUNTER — TELEPHONE (OUTPATIENT)
Dept: GASTROENTEROLOGY | Facility: CLINIC | Age: 61
End: 2021-05-26

## 2021-05-26 ENCOUNTER — OFFICE VISIT (OUTPATIENT)
Dept: GASTROENTEROLOGY | Facility: CLINIC | Age: 61
End: 2021-05-26
Payer: COMMERCIAL

## 2021-05-26 VITALS
HEIGHT: 64 IN | HEART RATE: 79 BPM | WEIGHT: 202 LBS | SYSTOLIC BLOOD PRESSURE: 128 MMHG | DIASTOLIC BLOOD PRESSURE: 78 MMHG | BODY MASS INDEX: 34.49 KG/M2

## 2021-05-26 DIAGNOSIS — Z12.11 SCREENING FOR COLORECTAL CANCER: ICD-10-CM

## 2021-05-26 DIAGNOSIS — Z12.12 SCREENING FOR COLORECTAL CANCER: ICD-10-CM

## 2021-05-26 DIAGNOSIS — Z12.11 COLON CANCER SCREENING: Primary | ICD-10-CM

## 2021-05-26 DIAGNOSIS — K21.9 GASTROESOPHAGEAL REFLUX DISEASE WITHOUT ESOPHAGITIS: ICD-10-CM

## 2021-05-26 DIAGNOSIS — K21.9 GASTROESOPHAGEAL REFLUX DISEASE, UNSPECIFIED WHETHER ESOPHAGITIS PRESENT: Primary | ICD-10-CM

## 2021-05-26 PROCEDURE — 3074F SYST BP LT 130 MM HG: CPT | Performed by: INTERNAL MEDICINE

## 2021-05-26 PROCEDURE — 3008F BODY MASS INDEX DOCD: CPT | Performed by: INTERNAL MEDICINE

## 2021-05-26 PROCEDURE — 3078F DIAST BP <80 MM HG: CPT | Performed by: INTERNAL MEDICINE

## 2021-05-26 PROCEDURE — S0285 CNSLT BEFORE SCREEN COLONOSC: HCPCS | Performed by: INTERNAL MEDICINE

## 2021-05-26 RX ORDER — POLYETHYLENE GLYCOL 3350, SODIUM CHLORIDE, SODIUM BICARBONATE, POTASSIUM CHLORIDE 420; 11.2; 5.72; 1.48 G/4L; G/4L; G/4L; G/4L
4 POWDER, FOR SOLUTION ORAL ONCE
Qty: 4000 ML | Refills: 0 | Status: SHIPPED | OUTPATIENT
Start: 2021-05-26 | End: 2021-05-26

## 2021-05-26 NOTE — TELEPHONE ENCOUNTER
Scheduled for: Colonoscopy [facility]   Provider Name: Dr Denny Saucedo    Date:  Mon 9/27/2021    Location: [facility]   Sedation: IV   Time: 9:45 am    Prep: split trilyte  Meds/Allergies Reconciled?: NKDA  Diagnosis with codes: screening Z12.11, GERD K21.9

## 2021-05-26 NOTE — PATIENT INSTRUCTIONS
1. Schedule screening colonoscopy following a split dose TriLyte preparation and either IV sedation or monitored anesthesia care per scheduling. 2. Schedule concurrent upper endoscopy for chronic gastroesophageal reflux. 3. Continue Protonix.

## 2021-06-09 ENCOUNTER — OFFICE VISIT (OUTPATIENT)
Dept: INTERNAL MEDICINE CLINIC | Facility: CLINIC | Age: 61
End: 2021-06-09
Payer: COMMERCIAL

## 2021-06-09 VITALS
BODY MASS INDEX: 34.83 KG/M2 | HEIGHT: 64 IN | OXYGEN SATURATION: 99 % | DIASTOLIC BLOOD PRESSURE: 78 MMHG | HEART RATE: 77 BPM | WEIGHT: 204 LBS | SYSTOLIC BLOOD PRESSURE: 124 MMHG

## 2021-06-09 DIAGNOSIS — E06.3 HYPOTHYROIDISM DUE TO HASHIMOTO'S THYROIDITIS: ICD-10-CM

## 2021-06-09 DIAGNOSIS — E78.00 PURE HYPERCHOLESTEROLEMIA: ICD-10-CM

## 2021-06-09 DIAGNOSIS — R91.1 INCIDENTAL LUNG NODULE, > 3MM AND < 8MM: ICD-10-CM

## 2021-06-09 DIAGNOSIS — E03.8 HYPOTHYROIDISM DUE TO HASHIMOTO'S THYROIDITIS: ICD-10-CM

## 2021-06-09 DIAGNOSIS — R00.2 HEART PALPITATIONS: Primary | ICD-10-CM

## 2021-06-09 DIAGNOSIS — K21.9 GASTROESOPHAGEAL REFLUX DISEASE WITHOUT ESOPHAGITIS: ICD-10-CM

## 2021-06-09 PROCEDURE — 3074F SYST BP LT 130 MM HG: CPT | Performed by: INTERNAL MEDICINE

## 2021-06-09 PROCEDURE — 3008F BODY MASS INDEX DOCD: CPT | Performed by: INTERNAL MEDICINE

## 2021-06-09 PROCEDURE — 99214 OFFICE O/P EST MOD 30 MIN: CPT | Performed by: INTERNAL MEDICINE

## 2021-06-09 PROCEDURE — 3078F DIAST BP <80 MM HG: CPT | Performed by: INTERNAL MEDICINE

## 2021-06-09 NOTE — PROGRESS NOTES
HPI:    Patient ID: Nia Sprague is a 56year old female. Patient presents with:  ER F/U: Stts she was seen at [facility] ER on 5/25/21 due to palpatations      Patient presents today for follow-up ER due to heart palpitations patient states that she has bee her occasionally needs to take the medicine daily to control the heartburns    Genitourinary: Negative for dysuria and frequency. Musculoskeletal: Negative for myalgias. Skin: Negative for pallor.    Neurological: Negative for dizziness, focal weakness Neck:      Thyroid: No thyromegaly. Vascular: No JVD. Cardiovascular:      Rate and Rhythm: Normal rate and regular rhythm. Heart sounds: Normal heart sounds. No murmur heard.      Pulmonary:      Effort: Pulmonary effort is normal. No respira any higher doses than that even   Continue to improve low saturated fat diet  - LIPID PANEL; Future    . Hypothyroidism due to Hashimoto's thyroiditis  Labs stable cpm    stable  ,levothyroxine 125 mcg daily          3.  Gastroesophageal reflux disease, eso

## 2021-06-10 PROBLEM — R00.2 HEART PALPITATIONS: Status: ACTIVE | Noted: 2021-06-10

## 2021-06-10 PROBLEM — R91.1 INCIDENTAL LUNG NODULE, > 3MM AND < 8MM: Status: ACTIVE | Noted: 2021-06-10

## 2021-06-13 RX ORDER — LEVOTHYROXINE SODIUM 0.12 MG/1
125 TABLET ORAL
Qty: 90 TABLET | Refills: 0 | OUTPATIENT
Start: 2021-06-13

## 2021-06-14 RX ORDER — LEVOTHYROXINE SODIUM 0.12 MG/1
125 TABLET ORAL
Qty: 90 TABLET | Refills: 0 | Status: SHIPPED | OUTPATIENT
Start: 2021-06-14 | End: 2021-09-10

## 2021-06-21 ENCOUNTER — HOSPITAL ENCOUNTER (OUTPATIENT)
Dept: CT IMAGING | Age: 61
Discharge: HOME | End: 2021-06-21
Attending: INTERNAL MEDICINE
Payer: COMMERCIAL

## 2021-06-21 DIAGNOSIS — R91.1 INCIDENTAL LUNG NODULE, > 3MM AND < 8MM: ICD-10-CM

## 2021-06-21 PROCEDURE — 71260 CT THORAX DX C+: CPT | Performed by: INTERNAL MEDICINE

## 2021-06-21 PROCEDURE — 71250 CT THORAX DX C-: CPT | Performed by: INTERNAL MEDICINE

## 2021-06-21 PROCEDURE — 71270 CT THORAX DX C-/C+: CPT | Performed by: INTERNAL MEDICINE

## 2021-06-24 ENCOUNTER — HOSPITAL ENCOUNTER (OUTPATIENT)
Dept: CV DIAGNOSTICS | Facility: HOSPITAL | Age: 61
Discharge: HOME | End: 2021-06-24
Attending: INTERNAL MEDICINE
Payer: COMMERCIAL

## 2021-06-24 DIAGNOSIS — R00.2 HEART PALPITATIONS: ICD-10-CM

## 2021-06-24 PROCEDURE — 93306 TTE W/DOPPLER COMPLETE: CPT | Performed by: INTERNAL MEDICINE

## 2021-06-24 PROCEDURE — 93225 XTRNL ECG REC<48 HRS REC: CPT | Performed by: INTERNAL MEDICINE

## 2021-06-24 PROCEDURE — 93227 XTRNL ECG REC<48 HR R&I: CPT | Performed by: INTERNAL MEDICINE

## 2021-07-05 ENCOUNTER — TELEPHONE (OUTPATIENT)
Dept: INTERNAL MEDICINE CLINIC | Facility: CLINIC | Age: 61
End: 2021-07-05

## 2021-07-05 DIAGNOSIS — R00.2 PALPITATIONS: Primary | ICD-10-CM

## 2021-07-05 DIAGNOSIS — I47.2 NSVT (NONSUSTAINED VENTRICULAR TACHYCARDIA) (HCC): ICD-10-CM

## 2021-07-21 ENCOUNTER — OFFICE VISIT (OUTPATIENT)
Dept: CARDIOLOGY CLINIC | Facility: CLINIC | Age: 61
End: 2021-07-21
Payer: COMMERCIAL

## 2021-07-21 VITALS
RESPIRATION RATE: 18 BRPM | DIASTOLIC BLOOD PRESSURE: 82 MMHG | WEIGHT: 205 LBS | SYSTOLIC BLOOD PRESSURE: 142 MMHG | HEART RATE: 84 BPM | BODY MASS INDEX: 35 KG/M2 | HEIGHT: 64 IN

## 2021-07-21 DIAGNOSIS — E78.00 PURE HYPERCHOLESTEROLEMIA: ICD-10-CM

## 2021-07-21 DIAGNOSIS — R00.2 HEART PALPITATIONS: Primary | ICD-10-CM

## 2021-07-21 PROCEDURE — 3008F BODY MASS INDEX DOCD: CPT | Performed by: INTERNAL MEDICINE

## 2021-07-21 PROCEDURE — 3079F DIAST BP 80-89 MM HG: CPT | Performed by: INTERNAL MEDICINE

## 2021-07-21 PROCEDURE — 99245 OFF/OP CONSLTJ NEW/EST HI 55: CPT | Performed by: INTERNAL MEDICINE

## 2021-07-21 PROCEDURE — 3077F SYST BP >= 140 MM HG: CPT | Performed by: INTERNAL MEDICINE

## 2021-07-21 RX ORDER — METOPROLOL SUCCINATE 50 MG/1
50 TABLET, EXTENDED RELEASE ORAL DAILY
Qty: 30 TABLET | Refills: 11 | Status: SHIPPED | OUTPATIENT
Start: 2021-07-21

## 2021-07-21 NOTE — PROGRESS NOTES
Cardiology Consult Note    7/21/2021    Nash Josue is a 59year old female. HPI:   22-year-old female presents for initial consultation.   Has been experiencing frequent palpitations and fluttering in her chest.  Had Holter monitor that showed freq Use      Smoking status: Never Smoker      Smokeless tobacco: Never Used    Vaping Use      Vaping Use: Never used    Alcohol use:  Yes      Alcohol/week: 0.8 standard drinks      Types: 1 Glasses of wine per week      Comment: rare    Drug use: No     [facility]. Interventional Cardiology.

## 2021-08-02 ENCOUNTER — HOSPITAL ENCOUNTER (OUTPATIENT)
Dept: CV DIAGNOSTICS | Facility: HOSPITAL | Age: 61
Discharge: HOME | End: 2021-08-02
Attending: INTERNAL MEDICINE
Payer: COMMERCIAL

## 2021-08-02 ENCOUNTER — HOSPITAL ENCOUNTER (OUTPATIENT)
Dept: NUCLEAR MEDICINE | Facility: HOSPITAL | Age: 61
Discharge: HOME | End: 2021-08-02
Attending: INTERNAL MEDICINE
Payer: COMMERCIAL

## 2021-08-02 DIAGNOSIS — R00.2 HEART PALPITATIONS: ICD-10-CM

## 2021-08-02 PROCEDURE — 78452 HT MUSCLE IMAGE SPECT MULT: CPT | Performed by: INTERNAL MEDICINE

## 2021-08-02 PROCEDURE — 93017 CV STRESS TEST TRACING ONLY: CPT | Performed by: INTERNAL MEDICINE

## 2021-08-02 PROCEDURE — 93016 CV STRESS TEST SUPVJ ONLY: CPT | Performed by: INTERNAL MEDICINE

## 2021-08-02 PROCEDURE — 93018 CV STRESS TEST I&R ONLY: CPT | Performed by: INTERNAL MEDICINE

## 2021-08-06 ENCOUNTER — HOSPITAL ENCOUNTER (OUTPATIENT)
Age: 61
Discharge: ANOTHER HOSPITAL | End: 2021-08-06
Attending: EMERGENCY MEDICINE
Payer: COMMERCIAL

## 2021-08-06 ENCOUNTER — HOSPITAL ENCOUNTER (EMERGENCY)
Facility: HOSPITAL | Age: 61
Discharge: HOME | End: 2021-08-06
Attending: EMERGENCY MEDICINE
Payer: COMMERCIAL

## 2021-08-06 VITALS
WEIGHT: 205 LBS | RESPIRATION RATE: 16 BRPM | HEART RATE: 92 BPM | BODY MASS INDEX: 35 KG/M2 | OXYGEN SATURATION: 97 % | HEIGHT: 64 IN | TEMPERATURE: 100 F | SYSTOLIC BLOOD PRESSURE: 161 MMHG | DIASTOLIC BLOOD PRESSURE: 80 MMHG

## 2021-08-06 VITALS
OXYGEN SATURATION: 100 % | SYSTOLIC BLOOD PRESSURE: 142 MMHG | TEMPERATURE: 99 F | HEART RATE: 96 BPM | DIASTOLIC BLOOD PRESSURE: 82 MMHG | RESPIRATION RATE: 20 BRPM

## 2021-08-06 DIAGNOSIS — R42 LIGHTHEADEDNESS: ICD-10-CM

## 2021-08-06 DIAGNOSIS — R04.0 LEFT-SIDED NOSEBLEED: Primary | ICD-10-CM

## 2021-08-06 DIAGNOSIS — R04.0 EPISTAXIS: Primary | ICD-10-CM

## 2021-08-06 LAB
BASOPHILS # BLD AUTO: 0.04 X10(3) UL (ref 0–0.2)
BASOPHILS NFR BLD AUTO: 0.5 %
DEPRECATED RDW RBC AUTO: 40.3 FL (ref 35.1–46.3)
EOSINOPHIL # BLD AUTO: 0.09 X10(3) UL (ref 0–0.7)
EOSINOPHIL NFR BLD AUTO: 1.1 %
ERYTHROCYTE [DISTWIDTH] IN BLOOD BY AUTOMATED COUNT: 11.9 % (ref 11–15)
HCT VFR BLD AUTO: 39.7 %
HGB BLD-MCNC: 13.3 G/DL
IMM GRANULOCYTES # BLD AUTO: 0.01 X10(3) UL (ref 0–1)
IMM GRANULOCYTES NFR BLD: 0.1 %
LYMPHOCYTES # BLD AUTO: 2.81 X10(3) UL (ref 1–4)
LYMPHOCYTES NFR BLD AUTO: 32.9 %
MCH RBC QN AUTO: 30.7 PG (ref 26–34)
MCHC RBC AUTO-ENTMCNC: 33.5 G/DL (ref 31–37)
MCV RBC AUTO: 91.7 FL
MONOCYTES # BLD AUTO: 0.57 X10(3) UL (ref 0.1–1)
MONOCYTES NFR BLD AUTO: 6.7 %
NEUTROPHILS # BLD AUTO: 5.02 X10 (3) UL (ref 1.5–7.7)
NEUTROPHILS # BLD AUTO: 5.02 X10(3) UL (ref 1.5–7.7)
NEUTROPHILS NFR BLD AUTO: 58.7 %
PLATELET # BLD AUTO: 307 10(3)UL (ref 150–450)
RBC # BLD AUTO: 4.33 X10(6)UL
WBC # BLD AUTO: 8.5 X10(3) UL (ref 4–11)

## 2021-08-06 PROCEDURE — 99215 OFFICE O/P EST HI 40 MIN: CPT

## 2021-08-06 PROCEDURE — 30903 CONTROL OF NOSEBLEED: CPT

## 2021-08-06 PROCEDURE — 85025 COMPLETE CBC W/AUTO DIFF WBC: CPT | Performed by: EMERGENCY MEDICINE

## 2021-08-06 PROCEDURE — 99283 EMERGENCY DEPT VISIT LOW MDM: CPT

## 2021-08-06 PROCEDURE — 99214 OFFICE O/P EST MOD 30 MIN: CPT

## 2021-08-06 PROCEDURE — 36415 COLL VENOUS BLD VENIPUNCTURE: CPT

## 2021-08-06 NOTE — ED QUICK NOTES
Left rhino rocket placed by dr Mikhail Patel, pt felt lightheaded and dizzy, pt to go to ed for further eval and management, 911 called

## 2021-08-06 NOTE — ED PROVIDER NOTES
Nosebleed  Patient Seen in: [facility]      History   Patient presents with:  Nose Bleed    Stated Complaint: BLOODY NOSE    HPI/Subjective:   HPI    The patient is a 49-year-old female with a past history of hyperlipidemia, hypothyroidism who °C) (Temporal)   Resp 20   SpO2 100%         Physical Exam    Constitutional: Well-developed well-nourished in no acute distress  Head: Normocephalic, no swelling or tenderness  Eyes: Nonicteric sclera, no conjunctival injection  ENT: TMs are clear and fla

## 2021-08-06 NOTE — ED QUICK NOTES
[facility] approx 1600 developed a nosebleed. [facility] went to [facility], rhinorocket was placed. States continues to bleed. [facility] sent to ER.

## 2021-08-06 NOTE — ED INITIAL ASSESSMENT (HPI)
Care assumed from EMS. Pt sent from [address] [place] [address] for epistaxis since around 1600. rhinorocket placed at IC to L nostril which pt states has not helped.    Pt denies blood thinners

## 2021-08-07 NOTE — ED PROVIDER NOTES
Patient Seen in: [facility] AND [facility] Emergency Department      History   Patient presents with:  Nose Bleed    Stated Complaint: epistaxis    HPI/Subjective:   HPI    68-year-old female presents for evaluation of epistaxis.   Patient seen in immediate care 16   Temp 99.7 °F (37.6 °C)   Temp src Temporal   SpO2 97 %   O2 Device None (Room air)       Current:BP (!) 161/80   Pulse 92   Temp 99.7 °F (37.6 °C) (Temporal)   Resp 16   Ht 162.6 cm (5' 4\")   Wt 93 kg   SpO2 97%   BMI 35.19 kg/m²         Physical Exa with ENT follow-up and return precautions. She verbalizes understanding of and agreement with this plan.         Disposition and Plan     Clinical Impression:  Epistaxis  (primary encounter diagnosis)     Disposition:  Discharge  8/6/2021  6:40 pm    [facility] AT [facility]

## 2021-08-10 ENCOUNTER — OFFICE VISIT (OUTPATIENT)
Dept: OTOLARYNGOLOGY | Facility: CLINIC | Age: 61
End: 2021-08-10
Payer: COMMERCIAL

## 2021-08-10 VITALS — HEIGHT: 64 IN | WEIGHT: 205 LBS | BODY MASS INDEX: 35 KG/M2

## 2021-08-10 DIAGNOSIS — R04.0 NOSEBLEED: Primary | ICD-10-CM

## 2021-08-10 PROCEDURE — 99213 OFFICE O/P EST LOW 20 MIN: CPT | Performed by: OTOLARYNGOLOGY

## 2021-08-10 PROCEDURE — 3008F BODY MASS INDEX DOCD: CPT | Performed by: OTOLARYNGOLOGY

## 2021-08-10 NOTE — PROGRESS NOTES
Yazmin Sanders is a 59year old female. Patient presents with: Follow - Up: pt presents today due to nose packet falling out. pt was in the er 08/06 due to nosebleeds.       HISTORY OF PRESENT ILLNESS  She presents with a history of US of the thyroid wishes to get off of her pantoprazole twice a day to once a day. Pal[place] Rey has only been able to tolerate Loratadine-D once a day as it does cause her to have palpitations at times.  No other signs, symptoms or complaints.  Overall she feels that she is at leas Concerns:        Caffeine Concern: Yes          soda, 8oz daily        Pt has a pacemaker: No        Pt has a defibrillator: No        Reaction to local anesthetic: No      Family History   Problem Relation Age of Onset   • Stroke Father    • Other (Ambrocio Palpation - Normal. Parotid gland - Normal. Thyroid gland - Normal.   Eyes Normal Conjunctiva - Right: Normal, Left: Normal. Pupil - Right: Normal, Left: Normal. Fundus - Right: Normal, Left: Normal.   Neurological Normal Memory - Normal. Cranial nerves - daily. (Patient not taking: Reported on 8/10/2021 ), Disp: 1 Bottle, Rfl: 3  •  Budesonide-Formoterol Fumarate (SYMBICORT) 160-4.5 MCG/ACT Inhalation Aerosol, Inhale 2 puffs into the lungs 2 (two) times daily.  (Patient not taking: Reported on 8/10/2021 ),

## 2021-08-23 ENCOUNTER — HOSPITAL ENCOUNTER (EMERGENCY)
Facility: HOSPITAL | Age: 61
Discharge: HOME | End: 2021-08-23
Attending: EMERGENCY MEDICINE
Payer: COMMERCIAL

## 2021-08-23 VITALS
TEMPERATURE: 99 F | SYSTOLIC BLOOD PRESSURE: 139 MMHG | DIASTOLIC BLOOD PRESSURE: 65 MMHG | BODY MASS INDEX: 35 KG/M2 | HEART RATE: 70 BPM | HEIGHT: 64 IN | OXYGEN SATURATION: 94 % | WEIGHT: 205 LBS | RESPIRATION RATE: 16 BRPM

## 2021-08-23 DIAGNOSIS — R04.0 EPISTAXIS: Primary | ICD-10-CM

## 2021-08-23 PROCEDURE — 99284 EMERGENCY DEPT VISIT MOD MDM: CPT

## 2021-08-23 PROCEDURE — 30901 CONTROL OF NOSEBLEED: CPT

## 2021-08-23 RX ORDER — LIDOCAINE HYDROCHLORIDE 20 MG/ML
10 SOLUTION OROPHARYNGEAL ONCE
Status: COMPLETED | OUTPATIENT
Start: 2021-08-23 | End: 2021-08-23

## 2021-08-24 NOTE — ED INITIAL ASSESSMENT (HPI)
Nose bleed started 1 hr pta. Nose clamp applied. Recently had rhino rocket placed. Pt denies blood thinners.

## 2021-08-24 NOTE — ED PROVIDER NOTES
Patient Seen in: [facility] AND [facility] Emergency Department    History   Patient presents with:  Nose Bleed    Stated Complaint: nose bleed    HPI    Patient complains of a nosebleed that began last week, had packed. Removed saaw ent.   Today began bleeding 48   • Cancer Paternal Grandmother    • Lipids Brother    • Breast Cancer Paternal Cousin Female    • Cancer Paternal Grandfather         lung cancer       Social History    Tobacco Use      Smoking status: Never Smoker      Smokeless tobacco: Never Used (primary encounter diagnosis)     Disposition:  Discharge  8/23/2021  9:41 pm    Follow-up:  Darryle Bowers, MD  [address] [place] [address] [place], [address] [place] [address] [facility]  [phone]

## 2021-08-26 ENCOUNTER — OFFICE VISIT (OUTPATIENT)
Dept: OTOLARYNGOLOGY | Facility: CLINIC | Age: 61
End: 2021-08-26
Payer: COMMERCIAL

## 2021-08-26 VITALS — BODY MASS INDEX: 35 KG/M2 | HEIGHT: 64 IN | WEIGHT: 205 LBS

## 2021-08-26 DIAGNOSIS — R04.0 NOSEBLEED: Primary | ICD-10-CM

## 2021-08-26 PROCEDURE — 3008F BODY MASS INDEX DOCD: CPT | Performed by: OTOLARYNGOLOGY

## 2021-08-26 PROCEDURE — 99213 OFFICE O/P EST LOW 20 MIN: CPT | Performed by: OTOLARYNGOLOGY

## 2021-08-27 NOTE — PROGRESS NOTES
Jan Brown is a 59year old female. Patient presents with:  Nose Bleed: pt is here today for continueing nose bleeds. She was seen on 08/10/2021. She was seen in ther ER on 08/23/2021. She had another nose bleed monday and tuesday.  Yesterday she w had significant improvement in her breathing congestion postnasal discharge and voice and even reflux and wishes to get off of her pantoprazole twice a day to once a day.  She has only been able to tolerate Loratadine-D once a day as it does cause her to h be pretty brisk. Last visit I felt that she might be having a posterior nosebleed. No obvious vessels noted on examination. Blood pressure seems to be reasonably well controlled although she does occasionally have some spikes currently on metoprolol.   [employer] Negative Fatigue, fever and weight loss. ENMT Negative Drooling. Eyes Negative Blurred vision and vision changes. Respiratory Negative Dyspnea and wheezing. Cardio Negative Chest pain, irregular heartbeat/palpitations and syncope.    GI Negative Abd Current Outpatient Medications:   •  metoprolol succinate 50 MG Oral Tablet 24 Hr, Take 1 tablet (50 mg total) by mouth daily. , Disp: 30 tablet, Rfl: 11  •  LEVOTHYROXINE SODIUM 125 MCG Oral Tab, Take 1 tablet (125 mcg total) by mouth before breakfas Chanda Lira to see if he could perhaps at least visualize a bleeding site and address that in the office setting.   She agrees with this plan and will return to see me as needed        This note was prepared using [employer] voice recognition dict

## 2021-09-09 ENCOUNTER — NURSE TRIAGE (OUTPATIENT)
Dept: INTERNAL MEDICINE CLINIC | Facility: CLINIC | Age: 61
End: 2021-09-09

## 2021-09-09 ENCOUNTER — LAB ENCOUNTER (OUTPATIENT)
Dept: LAB | Age: 61
End: 2021-09-09
Attending: INTERNAL MEDICINE
Payer: COMMERCIAL

## 2021-09-09 DIAGNOSIS — N39.0 URINARY TRACT INFECTION WITHOUT HEMATURIA, SITE UNSPECIFIED: Primary | ICD-10-CM

## 2021-09-09 DIAGNOSIS — N39.0 URINARY TRACT INFECTION WITHOUT HEMATURIA, SITE UNSPECIFIED: ICD-10-CM

## 2021-09-09 LAB
BILIRUB UR QL: NEGATIVE
CLARITY UR: CLEAR
COLOR UR: YELLOW
GLUCOSE UR-MCNC: NEGATIVE MG/DL
KETONES UR-MCNC: NEGATIVE MG/DL
LEUKOCYTE ESTERASE UR QL STRIP.AUTO: NEGATIVE
NITRITE UR QL STRIP.AUTO: NEGATIVE
PH UR: 7 [PH] (ref 5–8)
PROT UR-MCNC: NEGATIVE MG/DL
SP GR UR STRIP: 1.01 (ref 1–1.03)
UROBILINOGEN UR STRIP-ACNC: <2

## 2021-09-09 PROCEDURE — 81001 URINALYSIS AUTO W/SCOPE: CPT

## 2021-09-09 PROCEDURE — 87086 URINE CULTURE/COLONY COUNT: CPT

## 2021-09-09 RX ORDER — NITROFURANTOIN 25; 75 MG/1; MG/1
100 CAPSULE ORAL 2 TIMES DAILY
Qty: 14 CAPSULE | Refills: 0 | Status: SHIPPED | OUTPATIENT
Start: 2021-09-09 | End: 2021-09-16

## 2021-09-09 NOTE — TELEPHONE ENCOUNTER
Called pt  No answer   Left message on her VM -patient voice  was recognized    Recommend patient to continue with keep good water hydration , cranberry capsules to continue patient to complete urine and urine culture    We will send antibiotic , but m

## 2021-09-09 NOTE — TELEPHONE ENCOUNTER
Action Requested: Summary for Provider     []  Critical Lab, Recommendations Needed  [] Need Additional Advice  []   FYI    []   Need Orders  [] Need Medications Sent to Pharmacy  []  Other     SUMMARY: Pt states she is having urgency and frequency of urin

## 2021-09-10 RX ORDER — LEVOTHYROXINE SODIUM 0.12 MG/1
125 TABLET ORAL
Qty: 90 TABLET | Refills: 1 | Status: SHIPPED | OUTPATIENT
Start: 2021-09-10

## 2021-09-10 NOTE — TELEPHONE ENCOUNTER
Refill passed per [facility] protocol   Requested Prescriptions   Pending Prescriptions Disp Refills    LEVOTHYROXINE 125 MCG Oral Tab [Pharmacy Med Name: Levothyroxine Sodium 125 Mcg Tab Lupi] 90 tablet 0     Sig: Take 1 tablet (125 mcg total) by cathi

## 2021-09-24 ENCOUNTER — LAB ENCOUNTER (OUTPATIENT)
Dept: LAB | Facility: HOSPITAL | Age: 61
End: 2021-09-24
Attending: INTERNAL MEDICINE
Payer: COMMERCIAL

## 2021-09-24 DIAGNOSIS — Z01.818 PRE-OP TESTING: ICD-10-CM

## 2021-09-25 LAB — SARS-COV-2 RNA RESP QL NAA+PROBE: NOT DETECTED

## 2021-09-27 ENCOUNTER — ANESTHESIA EVENT (OUTPATIENT)
Dept: ENDOSCOPY | Facility: HOSPITAL | Age: 61
End: 2021-09-27
Payer: COMMERCIAL

## 2021-09-27 ENCOUNTER — HOSPITAL ENCOUNTER (OUTPATIENT)
Facility: HOSPITAL | Age: 61
Setting detail: HOSPITAL OUTPATIENT SURGERY
Discharge: HOME | End: 2021-09-27
Attending: INTERNAL MEDICINE | Admitting: INTERNAL MEDICINE
Payer: COMMERCIAL

## 2021-09-27 ENCOUNTER — ANESTHESIA (OUTPATIENT)
Dept: ENDOSCOPY | Facility: HOSPITAL | Age: 61
End: 2021-09-27
Payer: COMMERCIAL

## 2021-09-27 VITALS
SYSTOLIC BLOOD PRESSURE: 136 MMHG | HEART RATE: 76 BPM | OXYGEN SATURATION: 96 % | WEIGHT: 200 LBS | TEMPERATURE: 97 F | DIASTOLIC BLOOD PRESSURE: 63 MMHG | HEIGHT: 64 IN | RESPIRATION RATE: 16 BRPM | BODY MASS INDEX: 34.15 KG/M2

## 2021-09-27 DIAGNOSIS — K21.9 GASTROESOPHAGEAL REFLUX DISEASE WITHOUT ESOPHAGITIS: ICD-10-CM

## 2021-09-27 DIAGNOSIS — Z01.818 PRE-OP TESTING: Primary | ICD-10-CM

## 2021-09-27 DIAGNOSIS — Z12.11 COLON CANCER SCREENING: ICD-10-CM

## 2021-09-27 PROCEDURE — 43251 EGD REMOVE LESION SNARE: CPT | Performed by: INTERNAL MEDICINE

## 2021-09-27 PROCEDURE — 45385 COLONOSCOPY W/LESION REMOVAL: CPT | Performed by: INTERNAL MEDICINE

## 2021-09-27 PROCEDURE — 0DB68ZX EXCISION OF STOMACH, VIA NATURAL OR ARTIFICIAL OPENING ENDOSCOPIC, DIAGNOSTIC: ICD-10-PCS | Performed by: INTERNAL MEDICINE

## 2021-09-27 PROCEDURE — 0DBL8ZX EXCISION OF TRANSVERSE COLON, VIA NATURAL OR ARTIFICIAL OPENING ENDOSCOPIC, DIAGNOSTIC: ICD-10-PCS | Performed by: INTERNAL MEDICINE

## 2021-09-27 PROCEDURE — 0DBH8ZX EXCISION OF CECUM, VIA NATURAL OR ARTIFICIAL OPENING ENDOSCOPIC, DIAGNOSTIC: ICD-10-PCS | Performed by: INTERNAL MEDICINE

## 2021-09-27 RX ORDER — SODIUM CHLORIDE, SODIUM LACTATE, POTASSIUM CHLORIDE, CALCIUM CHLORIDE 600; 310; 30; 20 MG/100ML; MG/100ML; MG/100ML; MG/100ML
INJECTION, SOLUTION INTRAVENOUS CONTINUOUS
Status: DISCONTINUED | OUTPATIENT
Start: 2021-09-27 | End: 2021-09-27

## 2021-09-27 RX ORDER — NALOXONE HYDROCHLORIDE 0.4 MG/ML
80 INJECTION, SOLUTION INTRAMUSCULAR; INTRAVENOUS; SUBCUTANEOUS AS NEEDED
Status: DISCONTINUED | OUTPATIENT
Start: 2021-09-27 | End: 2021-09-27

## 2021-09-27 RX ORDER — MIDAZOLAM HYDROCHLORIDE 1 MG/ML
1 INJECTION INTRAMUSCULAR; INTRAVENOUS EVERY 5 MIN PRN
Status: DISCONTINUED | OUTPATIENT
Start: 2021-09-27 | End: 2021-09-27

## 2021-09-27 RX ORDER — LIDOCAINE HYDROCHLORIDE 10 MG/ML
INJECTION, SOLUTION EPIDURAL; INFILTRATION; INTRACAUDAL; PERINEURAL AS NEEDED
Status: DISCONTINUED | OUTPATIENT
Start: 2021-09-27 | End: 2021-09-27 | Stop reason: SURG

## 2021-09-27 RX ORDER — SODIUM CHLORIDE 0.9 % (FLUSH) 0.9 %
10 SYRINGE (ML) INJECTION AS NEEDED
Status: DISCONTINUED | OUTPATIENT
Start: 2021-09-27 | End: 2021-09-27

## 2021-09-27 RX ADMIN — SODIUM CHLORIDE, SODIUM LACTATE, POTASSIUM CHLORIDE, CALCIUM CHLORIDE: 600; 310; 30; 20 INJECTION, SOLUTION INTRAVENOUS at 10:23:00

## 2021-09-27 RX ADMIN — SODIUM CHLORIDE, SODIUM LACTATE, POTASSIUM CHLORIDE, CALCIUM CHLORIDE: 600; 310; 30; 20 INJECTION, SOLUTION INTRAVENOUS at 11:27:00

## 2021-09-27 RX ADMIN — LIDOCAINE HYDROCHLORIDE 25 MG: 10 INJECTION, SOLUTION EPIDURAL; INFILTRATION; INTRACAUDAL; PERINEURAL at 10:27:00

## 2021-09-27 NOTE — H&P
History & Physical Examination    Patient Name: Yazmin Sanders  MRN: [id number]  [facility]: [id number]  YOB: 1960    Diagnosis: Colorectal cancer screening, chronic gastroesophageal reflux, mild dysphagia      MONTELUKAST SODIUM 10 MG Oral Tab, Hypercholesterolemia    • Hyperlipidemia    • Hypothyroidism    • Lump, breast    • Mitral valve prolapse      Past Surgical History:   Procedure Laterality Date   • ADENOIDECTOMY     • COLONOSCOPY  2010   • ENDOMETRIAL BIOPSY - JAR(S): 2  2013   • ZACHARY LOC

## 2021-09-27 NOTE — ANESTHESIA POSTPROCEDURE EVALUATION
Patient: Kelli Davis    Procedure Summary     Date: 09/27/21 Room / Location: [facility] ENDOSCOPY 04 / [facility] ENDOSCOPY    Anesthesia Start: 6769 Anesthesia Stop: 7527    Procedures:       COLONOSCOPY/ESOPHAGOGASTRODUODENOSCOPY (EGD) (N/A )      ESOPHAGOGAST

## 2021-09-27 NOTE — OPERATIVE REPORT
[facility] Endoscopy Report      Date of Procedure:  09/27/21      Preoperative Diagnosis:  1. Colorectal cancer screening  2. Chronic gastroesophageal reflux  3. Mild episodic solid food dysphagia      Postoperative Diagnosis:  1.   [place] was straightened, insufflated air and fluid were suctioned and the endoscope was withdrawn. The procedures were well tolerated without immediate complication. Findings:  The preparation of the colon was good.   The terminal ileum was examined for 5 cm post bulbar regions were normal.      Impression:  1. Diminutive sigmoid colon polyps  2. Sigmoid colon diverticulosis, currently uncomplicated  3. Internal hemorrhoids  4. Multiple gastric polyps likely fundic gland in nature  5.   Small hiatal hernia

## 2021-09-27 NOTE — ANESTHESIA PREPROCEDURE EVALUATION
Anesthesia PreOp Note    HPI:     Melisa Barragan is a 59year old female who presents for preoperative consultation requested by: Tika Lomeli MD    Date of Surgery: 9/27/2021    Procedure(s):  COLONOSCOPY/ESOPHAGOGASTRODUODENOSCOPY (EGD)  ESO Past Surgical History:   Procedure Laterality Date   • ADENOIDECTOMY     • COLONOSCOPY     • ENDOMETRIAL BIOPSY - JAR(S): 2     • ZACHARY LOCALIZATION WIRE 1 SITE LEFT (CPT=19281)       appx date   • NEEDLE BIOPSY LEFT     •       x2   • Breast Cancer Paternal Cousin Female    • Cancer Paternal Grandfather         lung cancer     Social History    Socioeconomic History      Marital status:       Spouse name: Not on file      Number of children: Not on file      Years of education: Exercise per Session: Not on file  Stress:       Feeling of Stress : Not on file  Social Connections:       Frequency of Communication with Friends and Family: Not on file      Frequency of Social Gatherings with Friends and Family: Not on file      Attend (+) GERD, bowel prep    Endo/Other    (+) hypothyroidism,   Abdominal                Anesthesia Plan:   ASA:  2  Plan:   General  Informed Consent Plan and Risks Discussed With:  Patient  Discussed plan with:  Surgeon      I have informed Chad Lara

## 2021-09-29 ENCOUNTER — TELEPHONE (OUTPATIENT)
Dept: GASTROENTEROLOGY | Facility: CLINIC | Age: 61
End: 2021-09-29

## 2021-09-29 NOTE — TELEPHONE ENCOUNTER
----- Message from Juanito Keith MD sent at 9/28/2021  6:02 PM CDT -----  I spoke to Eugenio Mendesummer. She is feeling well. She had #2 subcentimeter polyps removed from the colon. #1 was a tubular adenoma and the other hyperplastic.   I have discussed the si

## 2021-09-29 NOTE — TELEPHONE ENCOUNTER
Health Maintenance Updated. 7 year colonoscopy recall entered into patient outreach in [address] [place] [address] Rd. Next colonoscopy will be due 9/27/2028.

## 2021-10-27 ENCOUNTER — OFFICE VISIT (OUTPATIENT)
Dept: DERMATOLOGY CLINIC | Facility: CLINIC | Age: 61
End: 2021-10-27
Payer: COMMERCIAL

## 2021-10-27 DIAGNOSIS — L81.4 LENTIGO: ICD-10-CM

## 2021-10-27 DIAGNOSIS — L82.1 SEBORRHEIC KERATOSES: Primary | ICD-10-CM

## 2021-10-27 DIAGNOSIS — D22.9 MULTIPLE NEVI: ICD-10-CM

## 2021-10-27 DIAGNOSIS — L56.5 DSAP (DISSEMINATED SUPERFICIAL ACTINIC POROKERATOSIS): ICD-10-CM

## 2021-10-27 DIAGNOSIS — D23.9 BENIGN NEOPLASM OF SKIN, UNSPECIFIED LOCATION: ICD-10-CM

## 2021-10-27 PROCEDURE — 99203 OFFICE O/P NEW LOW 30 MIN: CPT | Performed by: DERMATOLOGY

## 2021-11-01 ENCOUNTER — OFFICE VISIT (OUTPATIENT)
Dept: ORTHOPEDICS CLINIC | Facility: CLINIC | Age: 61
End: 2021-11-01
Payer: COMMERCIAL

## 2021-11-01 VITALS
SYSTOLIC BLOOD PRESSURE: 128 MMHG | WEIGHT: 200 LBS | DIASTOLIC BLOOD PRESSURE: 80 MMHG | HEART RATE: 78 BPM | HEIGHT: 64 IN | BODY MASS INDEX: 34.15 KG/M2

## 2021-11-01 DIAGNOSIS — E66.01 CLASS 2 SEVERE OBESITY DUE TO EXCESS CALORIES WITH SERIOUS COMORBIDITY AND BODY MASS INDEX (BMI) OF 35.0 TO 35.9 IN ADULT (HCC): ICD-10-CM

## 2021-11-01 DIAGNOSIS — M23.92 ACUTE INTERNAL DERANGEMENT OF LEFT KNEE: Primary | ICD-10-CM

## 2021-11-01 PROCEDURE — 3079F DIAST BP 80-89 MM HG: CPT | Performed by: PHYSICIAN ASSISTANT

## 2021-11-01 PROCEDURE — 20610 DRAIN/INJ JOINT/BURSA W/O US: CPT | Performed by: PHYSICIAN ASSISTANT

## 2021-11-01 PROCEDURE — 3008F BODY MASS INDEX DOCD: CPT | Performed by: PHYSICIAN ASSISTANT

## 2021-11-01 PROCEDURE — 3074F SYST BP LT 130 MM HG: CPT | Performed by: PHYSICIAN ASSISTANT

## 2021-11-01 PROCEDURE — 99213 OFFICE O/P EST LOW 20 MIN: CPT | Performed by: PHYSICIAN ASSISTANT

## 2021-11-01 RX ORDER — TRIAMCINOLONE ACETONIDE 40 MG/ML
40 INJECTION, SUSPENSION INTRA-ARTICULAR; INTRAMUSCULAR ONCE
Status: COMPLETED | OUTPATIENT
Start: 2021-11-01 | End: 2021-11-01

## 2021-11-01 RX ORDER — ERGOCALCIFEROL 1.25 MG/1
1000 CAPSULE ORAL DAILY
COMMUNITY

## 2021-11-01 NOTE — PROGRESS NOTES
NURSING INTAKE COMMENTS: Patient presents with: Follow - Up: 59year old female is here today c/o increasing L knee pain. Patient has injection back in 05/04/2021. Las Xray done: 05/02/21. Pain scale: 3/10 at this time.  Patient pain increases throughout t mouth nightly. 30 tablet 0   • loratadine 10 MG Oral Tab Take 10 mg by mouth as needed for Allergies. • Rosuvastatin Calcium 5 MG Oral Tab Take 1 tablet (5 mg total) by mouth nightly.  90 tablet 3   • Pantoprazole Sodium 40 MG Oral Tab EC Take 1 tablet Cuff Size: large)   Pulse 78   Ht 5' 4\" (1.626 m)   Wt 200 lb (90.7 kg)   Breastfeeding No   BMI 34.33 kg/m²   Constitutional: appears well hydrated, alert and responsive, no acute distress noted  Extremities: No erythema, ecchymosis, or effusion   Muscul follow-up in the office to review results. Follow Up: Return if symptoms worsen or fail to improve.     Patient seen and evaluated initially by Mariah Ashby PA-C and then with Stella Sanchez M.D.

## 2021-11-01 NOTE — PROGRESS NOTES
Per verbal order from Dr. Harriet Leone, draw up 5ml of 0.5% Marcaine and 1ml of Kenalog 40 for cortisone injection to the left knee. Patient provided education handout for cortisone injection. Patient left office without obtaining post injection vitals.

## 2021-11-08 NOTE — PROGRESS NOTES
Yazmin Sanders is a 59year old female. HPI:     CC:  Patient presents with:  Full Skin Exam: LOV: 5/15/17. presents for full body check. woudl like a dark spot on right thigh and one under left breast. denies any personal or family hx of skin cancer. tablet 11   • MONTELUKAST SODIUM 10 MG Oral Tab Take 1 tablet (10 mg total) by mouth nightly. 30 tablet 0   • loratadine 10 MG Oral Tab Take 10 mg by mouth as needed for Allergies.      • Rosuvastatin Calcium 5 MG Oral Tab Take 1 tablet (5 mg total) by mout Not on file    Other Topics      Concerns:         Service: Not Asked        Blood Transfusions: Not Asked        Caffeine Concern: Yes          soda, 8oz daily        Occupational Exposure: Not Asked        Hobby Hazards: Not Asked        Sleep Co the Last Year: Not on file      Number of Places Lived in the Last Year: Not on file      Unstable Housing in the Last Year: Not on file  Family History   Problem Relation Age of Onset   • Stroke Father    • Other (Parkinson's disease) Father    • Lipids M examination  See Assessment /Plan for additional history and physical exam also:    Assessment / plan:    No orders of the defined types were placed in this encounter.       Meds & Refills for this Visit:  Requested Prescriptions      No prescriptions reque noted RTC routine checkup 6 mos - one year or p.r.n.     Encounter Times Including precharting, reviewing chart, prior notes obtaining history: 10 minutes, medical exam :10 minutes, notes on body map, plan, counseling 10minutes My total time spent caring fo

## 2021-12-22 ENCOUNTER — HOSPITAL ENCOUNTER (OUTPATIENT)
Dept: MRI IMAGING | Age: 61
Discharge: HOME | End: 2021-12-22
Attending: PHYSICIAN ASSISTANT
Payer: COMMERCIAL

## 2021-12-22 DIAGNOSIS — M23.92 ACUTE INTERNAL DERANGEMENT OF LEFT KNEE: ICD-10-CM

## 2021-12-22 PROCEDURE — 73721 MRI JNT OF LWR EXTRE W/O DYE: CPT | Performed by: PHYSICIAN ASSISTANT

## 2022-01-17 ENCOUNTER — HOSPITAL ENCOUNTER (OUTPATIENT)
Dept: GENERAL RADIOLOGY | Facility: HOSPITAL | Age: 62
Discharge: HOME | End: 2022-01-17
Attending: ORTHOPAEDIC SURGERY
Payer: COMMERCIAL

## 2022-01-17 ENCOUNTER — OFFICE VISIT (OUTPATIENT)
Dept: ORTHOPEDICS CLINIC | Facility: CLINIC | Age: 62
End: 2022-01-17
Payer: COMMERCIAL

## 2022-01-17 DIAGNOSIS — M94.262 CHONDROMALACIA, LEFT KNEE: ICD-10-CM

## 2022-01-17 DIAGNOSIS — M23.92 ACUTE INTERNAL DERANGEMENT OF LEFT KNEE: ICD-10-CM

## 2022-01-17 DIAGNOSIS — M23.204 DEGENERATIVE TEAR OF LEFT MEDIAL MENISCUS: Primary | ICD-10-CM

## 2022-01-17 PROCEDURE — 73564 X-RAY EXAM KNEE 4 OR MORE: CPT | Performed by: ORTHOPAEDIC SURGERY

## 2022-01-17 PROCEDURE — 99213 OFFICE O/P EST LOW 20 MIN: CPT | Performed by: ORTHOPAEDIC SURGERY

## 2022-01-17 NOTE — PROGRESS NOTES
NURSING INTAKE COMMENTS: Patient presents with:  Knee Pain: Left knee pain, 2-3/10 to day in the office. Patient completed the MRI, she is here to discuss what her options are.        HPI: This 59year old female presents today      Past Medical History: Grandmother 48   • Cancer Paternal Grandmother    • Lipids Brother    • Breast Cancer Paternal Cousin Female    • Cancer Paternal Grandfather         lung cancer     No family Hx of DVT/PE    Social History    Occupational History      Not on file    [place] Normal motor and sensory left lower extremity. Imaging: MRI shows chondromalacia in the medial and patellofemoral joints as well as medial meniscus tear posteriorly. No bony edema. Ligaments are intact.     We took standing x-rays including skiers view amount of marrow edema within the medial tibial plateau suggestive of stress changes. There are tricompartmental osteophytes. Remainder of the osseous structures are otherwise intact without acute fracture, dislocation, or marrow replacing lesion.   JOINT COVID illnesses. I told her to make an appoint about 2 to 3 weeks before she wishes to have surgery. We talked little bit about the recovery but did not have a full formal preoperative discussion.   I will see her if she wishes to proceed with arthroscopy

## 2022-02-22 ENCOUNTER — OFFICE VISIT (OUTPATIENT)
Dept: ORTHOPEDICS CLINIC | Facility: CLINIC | Age: 62
End: 2022-02-22
Payer: COMMERCIAL

## 2022-02-22 ENCOUNTER — PATIENT MESSAGE (OUTPATIENT)
Dept: INTERNAL MEDICINE CLINIC | Facility: CLINIC | Age: 62
End: 2022-02-22

## 2022-02-22 VITALS — HEIGHT: 64 IN | BODY MASS INDEX: 34.15 KG/M2 | WEIGHT: 200 LBS

## 2022-02-22 DIAGNOSIS — E66.01 CLASS 2 SEVERE OBESITY DUE TO EXCESS CALORIES WITH SERIOUS COMORBIDITY AND BODY MASS INDEX (BMI) OF 35.0 TO 35.9 IN ADULT (HCC): ICD-10-CM

## 2022-02-22 DIAGNOSIS — M23.92 ACUTE INTERNAL DERANGEMENT OF LEFT KNEE: ICD-10-CM

## 2022-02-22 DIAGNOSIS — M23.204 DEGENERATIVE TEAR OF LEFT MEDIAL MENISCUS: Primary | ICD-10-CM

## 2022-02-22 DIAGNOSIS — M94.262 CHONDROMALACIA, LEFT KNEE: ICD-10-CM

## 2022-02-22 PROCEDURE — 3008F BODY MASS INDEX DOCD: CPT | Performed by: ORTHOPAEDIC SURGERY

## 2022-02-22 PROCEDURE — 99213 OFFICE O/P EST LOW 20 MIN: CPT | Performed by: ORTHOPAEDIC SURGERY

## 2022-02-23 NOTE — TELEPHONE ENCOUNTER
From: George Agosto  To: Sarah Roblero MD  Sent: 2/22/2022 7:51 PM CST  Subject: Need Medical Clearance Letter     Dr. Kaveh Tee,  After numerous tests and consults with Dr Yazmin Sanz has recommended that I have arthroscopic surgery to my L knee to repair a torn meniscus & clean up knee capsule to hopefully lessen current knee pain and improve my mobility. I wish to have the surgery done during the last week of March (during my vacation). He requires that I get a medical clearance letter from you to certify that I am safe to go under general anesthesia for the procedure. How do I go about getting this from you in a timely manner? ?? He won't put me on the schedule till you send it to him. Help please!     Daria Craig

## 2022-02-25 DIAGNOSIS — K21.9 GASTROESOPHAGEAL REFLUX DISEASE: ICD-10-CM

## 2022-02-25 DIAGNOSIS — E03.8 HYPOTHYROIDISM DUE TO HASHIMOTO'S THYROIDITIS: ICD-10-CM

## 2022-02-25 DIAGNOSIS — E06.3 HYPOTHYROIDISM DUE TO HASHIMOTO'S THYROIDITIS: ICD-10-CM

## 2022-02-25 DIAGNOSIS — E78.00 PURE HYPERCHOLESTEROLEMIA: Primary | ICD-10-CM

## 2022-02-25 RX ORDER — ROSUVASTATIN CALCIUM 5 MG/1
5 TABLET, COATED ORAL NIGHTLY
Qty: 90 TABLET | Refills: 0 | Status: SHIPPED | OUTPATIENT
Start: 2022-02-25 | End: 2022-09-26

## 2022-02-25 RX ORDER — MONTELUKAST SODIUM 10 MG/1
10 TABLET ORAL NIGHTLY
Qty: 90 TABLET | Refills: 3 | Status: SHIPPED | OUTPATIENT
Start: 2022-02-25

## 2022-02-25 RX ORDER — PANTOPRAZOLE SODIUM 40 MG/1
40 TABLET, DELAYED RELEASE ORAL NIGHTLY
Qty: 90 TABLET | Refills: 1 | Status: SHIPPED | OUTPATIENT
Start: 2022-02-25 | End: 2022-11-11

## 2022-02-25 NOTE — TELEPHONE ENCOUNTER
Refill passed per [facility] protocol. Requested Prescriptions   Pending Prescriptions Disp Refills    ROSUVASTATIN 5 MG Oral Tab [Pharmacy Med Name: Rosuvastatin Calcium 5 Mg Tab Nort] 90 tablet 0     Sig: Take 1 tablet (5 mg total) by mouth nightly. Cholesterol Medication Protocol Failed - 2/25/2022  1:33 AM        Failed - ALT in past 12 months        Failed - LDL in past 12 months        Failed - Last ALT < 80       Lab Results   Component Value Date    ALT 32 02/17/2021             Failed - Last LDL < 130     Lab Results   Component Value Date     (H) 02/17/2021               Passed - Appointment in past 12 or next 3 months           PANTOPRAZOLE 40 MG Oral Tab EC [Pharmacy Med Name: Pantoprazole Sodium Ec 40 Mg Tab Auro] 90 tablet 0     Sig: Take 1 tablet (40 mg total) by mouth nightly.         Gastrointestional Medication Protocol Passed - 2/25/2022  1:33 AM        Passed - Appointment in past 12 or next 3 months            Future Appointments         Provider Department Appt Notes    In 4 days Abiel Grubbs PA-C [id number] [facility],2Nd Floor Family Medicine pre-op          Recent Outpatient Visits              3 days ago Degenerative tear of left medial meniscus    [facility] for Balbina Wynn MD    Office Visit    1 month ago Degenerative tear of left medial meniscus    [facility] for Balbina Wynn MD    Office Visit    3 months ago Acute internal derangement of left knee    [facility] for Ardyce Ivanoff, MD    Office Visit    4 months ago Seborrheic keratoses    [facility] Ree Ibarra MD    Office Visit    6 months ago [facility]    Office Visit

## 2022-02-25 NOTE — TELEPHONE ENCOUNTER
Please review. Protocol Failed or has no Protocol  Requested Prescriptions   Pending Prescriptions Disp Refills    ROSUVASTATIN 5 MG Oral Tab [Pharmacy Med Name: Rosuvastatin Calcium 5 Mg Tab Nort] 90 tablet 0     Sig: Take 1 tablet (5 mg total) by mouth nightly. Cholesterol Medication Protocol Failed - 2/25/2022  1:33 AM        Failed - ALT in past 12 months        Failed - LDL in past 12 months        Failed - Last ALT < 80       Lab Results   Component Value Date    ALT 32 02/17/2021             Failed - Last LDL < 130     Lab Results   Component Value Date     (H) 02/17/2021               Passed - Appointment in past 12 or next 3 months           PANTOPRAZOLE 40 MG Oral Tab EC [Pharmacy Med Name: Pantoprazole Sodium Ec 40 Mg Tab Auro] 90 tablet 0     Sig: Take 1 tablet (40 mg total) by mouth nightly.         Gastrointestional Medication Protocol Passed - 2/25/2022  1:33 AM        Passed - Appointment in past 12 or next 3 months            Future Appointments         Provider Department Appt Notes    In 4 days Sumi Stout PA-C [address] [place] [address],[address] Floor Family Medicine pre-op          Recent Outpatient Visits              3 days ago Degenerative tear of left medial meniscus    [facility] for Carmine Greenspan, MD    Office Visit    1 month ago Degenerative tear of left medial meniscus    [facility] for Carmine Greenspan, MD    Office Visit    3 months ago Acute internal derangement of left knee    [facility] for Carmine Greenspan, MD    Office Visit    4 months ago Seborrheic keratoses    [facility] [place] [facility] Stuart Catalan MD    Office Visit    6 months ago [facility] for Maciej Gerber Shon Chad, MD    Office Visit

## 2022-02-25 NOTE — TELEPHONE ENCOUNTER
Please review. Protocol Failed or has no Protocol  Requested Prescriptions   Pending Prescriptions Disp Refills    ROSUVASTATIN 5 MG Oral Tab [Pharmacy Med Name: Rosuvastatin Calcium 5 Mg Tab Nort] 90 tablet 0     Sig: Take 1 tablet (5 mg total) by mouth nightly. Cholesterol Medication Protocol Failed - 2/25/2022  1:33 AM        Failed - ALT in past 12 months        Failed - LDL in past 12 months        Failed - Last ALT < 80       Lab Results   Component Value Date    ALT 32 02/17/2021             Failed - Last LDL < 130     Lab Results   Component Value Date     (H) 02/17/2021               Passed - Appointment in past 12 or next 3 months          Signed Prescriptions Disp Refills    pantoprazole 40 MG Oral Tab EC 90 tablet 1     Sig: Take 1 tablet (40 mg total) by mouth nightly.         Gastrointestional Medication Protocol Passed - 2/25/2022  1:33 AM        Passed - Appointment in past 12 or next 3 months            Future Appointments         Provider Department Appt Notes    In 4 days Milena Dietz PA-C [address] [facility],[address] Floor Family Medicine pre-op          Recent Outpatient Visits              3 days ago Degenerative tear of left medial meniscus    [facility] for Avanell Pita, MD    Office Visit    1 month ago Degenerative tear of left medial meniscus    [facility] for Avanell Pita, MD    Office Visit    3 months ago Acute internal derangement of left knee    [facility] for Avanell Pita, MD    Office Visit    4 months ago Seborrheic keratoses    [facility] Marybelle Najjar, MD    Office Visit    6 months ago [facility] for Maciej Najera Margorie Fees, MD    Office Visit

## 2022-02-28 ENCOUNTER — TELEPHONE (OUTPATIENT)
Dept: ORTHOPEDICS CLINIC | Facility: CLINIC | Age: 62
End: 2022-02-28

## 2022-02-28 NOTE — TELEPHONE ENCOUNTER
Type of surgery:  Left knee arthroscopy, partial medial meniscectomy, medial synovectomy  Date: 3/24/22  Location: [facility]  Medical Clearance:      *Medical: Yes 3/1/22      *Dental:      *Other:  Prior Authorization Status: Pending  Workers Comp:  Medacta/Jamia:  Checklist:  Other: POV 4/4/22

## 2022-03-01 ENCOUNTER — OFFICE VISIT (OUTPATIENT)
Dept: FAMILY MEDICINE CLINIC | Facility: CLINIC | Age: 62
End: 2022-03-01
Payer: COMMERCIAL

## 2022-03-01 VITALS
WEIGHT: 201 LBS | DIASTOLIC BLOOD PRESSURE: 85 MMHG | HEIGHT: 64 IN | SYSTOLIC BLOOD PRESSURE: 138 MMHG | BODY MASS INDEX: 34.31 KG/M2 | HEART RATE: 82 BPM

## 2022-03-01 DIAGNOSIS — Z01.818 PRE-OP EXAMINATION: Primary | ICD-10-CM

## 2022-03-01 DIAGNOSIS — Z12.31 ENCOUNTER FOR SCREENING MAMMOGRAM FOR BREAST CANCER: ICD-10-CM

## 2022-03-01 DIAGNOSIS — M23.204 DEGENERATIVE TEAR OF LEFT MEDIAL MENISCUS: ICD-10-CM

## 2022-03-01 PROCEDURE — 3008F BODY MASS INDEX DOCD: CPT | Performed by: PHYSICIAN ASSISTANT

## 2022-03-01 PROCEDURE — 3079F DIAST BP 80-89 MM HG: CPT | Performed by: PHYSICIAN ASSISTANT

## 2022-03-01 PROCEDURE — 99213 OFFICE O/P EST LOW 20 MIN: CPT | Performed by: PHYSICIAN ASSISTANT

## 2022-03-01 PROCEDURE — 3075F SYST BP GE 130 - 139MM HG: CPT | Performed by: PHYSICIAN ASSISTANT

## 2022-03-05 ENCOUNTER — EKG ENCOUNTER (OUTPATIENT)
Dept: LAB | Age: 62
End: 2022-03-05
Attending: PHYSICIAN ASSISTANT
Payer: COMMERCIAL

## 2022-03-05 ENCOUNTER — LAB ENCOUNTER (OUTPATIENT)
Dept: LAB | Age: 62
End: 2022-03-05
Attending: PHYSICIAN ASSISTANT
Payer: COMMERCIAL

## 2022-03-05 DIAGNOSIS — E03.8 HYPOTHYROIDISM DUE TO HASHIMOTO'S THYROIDITIS: ICD-10-CM

## 2022-03-05 DIAGNOSIS — R73.09 ABNORMAL GLUCOSE: ICD-10-CM

## 2022-03-05 DIAGNOSIS — Z01.818 PRE-OP EXAMINATION: ICD-10-CM

## 2022-03-05 DIAGNOSIS — E06.3 HYPOTHYROIDISM DUE TO HASHIMOTO'S THYROIDITIS: ICD-10-CM

## 2022-03-05 DIAGNOSIS — E78.00 PURE HYPERCHOLESTEROLEMIA: ICD-10-CM

## 2022-03-05 LAB
ALBUMIN SERPL-MCNC: 3.7 G/DL (ref 3.4–5)
ALBUMIN/GLOB SERPL: 1.1 {RATIO} (ref 1–2)
ALP LIVER SERPL-CCNC: 44 U/L
ALT SERPL-CCNC: 26 U/L
ANION GAP SERPL CALC-SCNC: 6 MMOL/L (ref 0–18)
APTT PPP: 31.6 SECONDS (ref 23.3–35.6)
AST SERPL-CCNC: 19 U/L (ref 15–37)
BASOPHILS # BLD AUTO: 0.06 X10(3) UL (ref 0–0.2)
BASOPHILS NFR BLD AUTO: 1.3 %
BILIRUB SERPL-MCNC: 1.5 MG/DL (ref 0.1–2)
BILIRUB UR QL: NEGATIVE
BUN BLD-MCNC: 17 MG/DL (ref 7–18)
BUN/CREAT SERPL: 18.9 (ref 10–20)
CALCIUM BLD-MCNC: 9.6 MG/DL (ref 8.5–10.1)
CHLORIDE SERPL-SCNC: 105 MMOL/L (ref 98–112)
CHOLEST SERPL-MCNC: 181 MG/DL (ref ?–200)
CLARITY UR: CLEAR
CO2 SERPL-SCNC: 30 MMOL/L (ref 21–32)
COLOR UR: COLORLESS
CREAT BLD-MCNC: 0.9 MG/DL
DEPRECATED RDW RBC AUTO: 43.6 FL (ref 35.1–46.3)
EOSINOPHIL # BLD AUTO: 0.1 X10(3) UL (ref 0–0.7)
EOSINOPHIL NFR BLD AUTO: 2.2 %
ERYTHROCYTE [DISTWIDTH] IN BLOOD BY AUTOMATED COUNT: 12.5 % (ref 11–15)
EST. AVERAGE GLUCOSE BLD GHB EST-MCNC: 128 MG/DL (ref 68–126)
FASTING PATIENT LIPID ANSWER: YES
FASTING STATUS PATIENT QL REPORTED: YES
GLOBULIN PLAS-MCNC: 3.5 G/DL (ref 2.8–4.4)
GLUCOSE BLD-MCNC: 112 MG/DL (ref 70–99)
GLUCOSE UR-MCNC: NEGATIVE MG/DL
HBA1C MFR BLD: 6.1 % (ref ?–5.7)
HCT VFR BLD AUTO: 40.7 %
HDLC SERPL-MCNC: 53 MG/DL (ref 40–59)
HGB BLD-MCNC: 13.2 G/DL
IMM GRANULOCYTES # BLD AUTO: 0 X10(3) UL (ref 0–1)
IMM GRANULOCYTES NFR BLD: 0 %
INR BLD: 0.95 (ref 0.8–1.2)
KETONES UR-MCNC: NEGATIVE MG/DL
LDLC SERPL CALC-MCNC: 109 MG/DL (ref ?–100)
LYMPHOCYTES # BLD AUTO: 1.37 X10(3) UL (ref 1–4)
LYMPHOCYTES NFR BLD AUTO: 29.5 %
MCH RBC QN AUTO: 30.6 PG (ref 26–34)
MCHC RBC AUTO-ENTMCNC: 32.4 G/DL (ref 31–37)
MCV RBC AUTO: 94.4 FL
MONOCYTES # BLD AUTO: 0.52 X10(3) UL (ref 0.1–1)
MONOCYTES NFR BLD AUTO: 11.2 %
NEUTROPHILS # BLD AUTO: 2.59 X10 (3) UL (ref 1.5–7.7)
NEUTROPHILS # BLD AUTO: 2.59 X10(3) UL (ref 1.5–7.7)
NEUTROPHILS NFR BLD AUTO: 55.8 %
NITRITE UR QL STRIP.AUTO: NEGATIVE
NONHDLC SERPL-MCNC: 128 MG/DL (ref ?–130)
OSMOLALITY SERPL CALC.SUM OF ELEC: 294 MOSM/KG (ref 275–295)
PH UR: 7 [PH] (ref 5–8)
PLATELET # BLD AUTO: 278 10(3)UL (ref 150–450)
PROT SERPL-MCNC: 7.2 G/DL (ref 6.4–8.2)
PROT UR-MCNC: NEGATIVE MG/DL
PROTHROMBIN TIME: 12.7 SECONDS (ref 11.6–14.8)
RBC # BLD AUTO: 4.31 X10(6)UL
SODIUM SERPL-SCNC: 141 MMOL/L (ref 136–145)
SP GR UR STRIP: 1.01 (ref 1–1.03)
TRIGL SERPL-MCNC: 103 MG/DL (ref 30–149)
TSI SER-ACNC: 0.48 MIU/ML (ref 0.36–3.74)
UROBILINOGEN UR STRIP-ACNC: <2
VIT C UR-MCNC: NEGATIVE MG/DL
WBC # BLD AUTO: 4.6 X10(3) UL (ref 4–11)

## 2022-03-05 PROCEDURE — 93005 ELECTROCARDIOGRAM TRACING: CPT

## 2022-03-05 PROCEDURE — 85730 THROMBOPLASTIN TIME PARTIAL: CPT

## 2022-03-05 PROCEDURE — 93010 ELECTROCARDIOGRAM REPORT: CPT | Performed by: PHYSICIAN ASSISTANT

## 2022-03-05 PROCEDURE — 85610 PROTHROMBIN TIME: CPT

## 2022-03-05 PROCEDURE — 83036 HEMOGLOBIN GLYCOSYLATED A1C: CPT

## 2022-03-05 PROCEDURE — 36415 COLL VENOUS BLD VENIPUNCTURE: CPT

## 2022-03-05 PROCEDURE — 85025 COMPLETE CBC W/AUTO DIFF WBC: CPT

## 2022-03-05 PROCEDURE — 80053 COMPREHEN METABOLIC PANEL: CPT

## 2022-03-05 PROCEDURE — 84443 ASSAY THYROID STIM HORMONE: CPT

## 2022-03-05 PROCEDURE — 80061 LIPID PANEL: CPT

## 2022-03-05 PROCEDURE — 81001 URINALYSIS AUTO W/SCOPE: CPT

## 2022-03-06 RX ORDER — LEVOTHYROXINE SODIUM 0.12 MG/1
125 TABLET ORAL
Qty: 90 TABLET | Refills: 1 | Status: SHIPPED | OUTPATIENT
Start: 2022-03-06 | End: 2022-08-30

## 2022-03-08 ENCOUNTER — TELEPHONE (OUTPATIENT)
Dept: FAMILY MEDICINE CLINIC | Facility: CLINIC | Age: 62
End: 2022-03-08

## 2022-03-08 NOTE — TELEPHONE ENCOUNTER
Spoke to patient and informed she is cleared for surgery per Rajni Oneill also informed patient medical clearance was faxed over to Dr. Lonzo Kussmaul office. Full name and  verified.

## 2022-03-08 NOTE — TELEPHONE ENCOUNTER
Medical clearance faxed over to Dr. Lola Trujillo office along with copy of lab and EKG results. Waiting to received fax confirmation.

## 2022-03-21 ENCOUNTER — LAB ENCOUNTER (OUTPATIENT)
Dept: LAB | Age: 62
End: 2022-03-21
Attending: ORTHOPAEDIC SURGERY
Payer: COMMERCIAL

## 2022-03-21 DIAGNOSIS — Z01.818 PRE-OP TESTING: ICD-10-CM

## 2022-03-21 RX ORDER — MULTIVITAMIN WITH IRON
250 TABLET ORAL
COMMUNITY

## 2022-03-22 LAB — SARS-COV-2 RNA RESP QL NAA+PROBE: NOT DETECTED

## 2022-03-24 ENCOUNTER — ANESTHESIA (OUTPATIENT)
Dept: SURGERY | Facility: HOSPITAL | Age: 62
End: 2022-03-24
Payer: COMMERCIAL

## 2022-03-24 ENCOUNTER — HOSPITAL ENCOUNTER (OUTPATIENT)
Facility: HOSPITAL | Age: 62
Setting detail: HOSPITAL OUTPATIENT SURGERY
Discharge: HOME | End: 2022-03-24
Attending: ORTHOPAEDIC SURGERY | Admitting: ORTHOPAEDIC SURGERY
Payer: COMMERCIAL

## 2022-03-24 ENCOUNTER — ANESTHESIA EVENT (OUTPATIENT)
Dept: SURGERY | Facility: HOSPITAL | Age: 62
End: 2022-03-24
Payer: COMMERCIAL

## 2022-03-24 VITALS
HEIGHT: 64 IN | RESPIRATION RATE: 18 BRPM | TEMPERATURE: 98 F | DIASTOLIC BLOOD PRESSURE: 80 MMHG | OXYGEN SATURATION: 93 % | BODY MASS INDEX: 33.46 KG/M2 | WEIGHT: 196 LBS | HEART RATE: 112 BPM | SYSTOLIC BLOOD PRESSURE: 158 MMHG

## 2022-03-24 DIAGNOSIS — Z01.818 PRE-OP TESTING: Primary | ICD-10-CM

## 2022-03-24 DIAGNOSIS — M23.204 DEGENERATIVE TEAR OF LEFT MEDIAL MENISCUS: ICD-10-CM

## 2022-03-24 DIAGNOSIS — M65.9 SYNOVITIS OF LEFT KNEE: ICD-10-CM

## 2022-03-24 DIAGNOSIS — M94.262 CHONDROMALACIA, LEFT KNEE: ICD-10-CM

## 2022-03-24 PROBLEM — M65.162: Status: ACTIVE | Noted: 2022-03-24

## 2022-03-24 PROBLEM — M65.962 SYNOVITIS OF LEFT KNEE: Status: ACTIVE | Noted: 2022-03-24

## 2022-03-24 PROCEDURE — 0SBD4ZZ EXCISION OF LEFT KNEE JOINT, PERCUTANEOUS ENDOSCOPIC APPROACH: ICD-10-PCS | Performed by: ORTHOPAEDIC SURGERY

## 2022-03-24 RX ORDER — METOPROLOL TARTRATE 5 MG/5ML
2.5 INJECTION INTRAVENOUS ONCE
Status: DISCONTINUED | OUTPATIENT
Start: 2022-03-24 | End: 2022-03-24

## 2022-03-24 RX ORDER — HYDROMORPHONE HYDROCHLORIDE 1 MG/ML
0.4 INJECTION, SOLUTION INTRAMUSCULAR; INTRAVENOUS; SUBCUTANEOUS EVERY 5 MIN PRN
Status: DISCONTINUED | OUTPATIENT
Start: 2022-03-24 | End: 2022-03-24

## 2022-03-24 RX ORDER — MAGNESIUM HYDROXIDE 1200 MG/15ML
LIQUID ORAL CONTINUOUS PRN
Status: COMPLETED | OUTPATIENT
Start: 2022-03-24 | End: 2022-03-24

## 2022-03-24 RX ORDER — MORPHINE SULFATE 4 MG/ML
2 INJECTION, SOLUTION INTRAMUSCULAR; INTRAVENOUS EVERY 10 MIN PRN
Status: DISCONTINUED | OUTPATIENT
Start: 2022-03-24 | End: 2022-03-24

## 2022-03-24 RX ORDER — NALOXONE HYDROCHLORIDE 0.4 MG/ML
80 INJECTION, SOLUTION INTRAMUSCULAR; INTRAVENOUS; SUBCUTANEOUS AS NEEDED
Status: DISCONTINUED | OUTPATIENT
Start: 2022-03-24 | End: 2022-03-24

## 2022-03-24 RX ORDER — HYDROMORPHONE HYDROCHLORIDE 1 MG/ML
0.2 INJECTION, SOLUTION INTRAMUSCULAR; INTRAVENOUS; SUBCUTANEOUS EVERY 5 MIN PRN
Status: DISCONTINUED | OUTPATIENT
Start: 2022-03-24 | End: 2022-03-24

## 2022-03-24 RX ORDER — CEFAZOLIN SODIUM/WATER 2 G/20 ML
2 SYRINGE (ML) INTRAVENOUS ONCE
Status: COMPLETED | OUTPATIENT
Start: 2022-03-24 | End: 2022-03-24

## 2022-03-24 RX ORDER — ONDANSETRON 2 MG/ML
INJECTION INTRAMUSCULAR; INTRAVENOUS AS NEEDED
Status: DISCONTINUED | OUTPATIENT
Start: 2022-03-24 | End: 2022-03-24 | Stop reason: SURG

## 2022-03-24 RX ORDER — ASPIRIN 81 MG/1
81 TABLET ORAL 2 TIMES DAILY WITH MEALS
Qty: 24 TABLET | Refills: 0 | Status: SHIPPED | OUTPATIENT
Start: 2022-03-24

## 2022-03-24 RX ORDER — METOCLOPRAMIDE 10 MG/1
10 TABLET ORAL ONCE
Status: COMPLETED | OUTPATIENT
Start: 2022-03-24 | End: 2022-03-24

## 2022-03-24 RX ORDER — ROPIVACAINE HYDROCHLORIDE 5 MG/ML
INJECTION, SOLUTION EPIDURAL; INFILTRATION; PERINEURAL AS NEEDED
Status: DISCONTINUED | OUTPATIENT
Start: 2022-03-24 | End: 2022-03-24 | Stop reason: HOSPADM

## 2022-03-24 RX ORDER — MIDAZOLAM HYDROCHLORIDE 1 MG/ML
INJECTION INTRAMUSCULAR; INTRAVENOUS AS NEEDED
Status: DISCONTINUED | OUTPATIENT
Start: 2022-03-24 | End: 2022-03-24 | Stop reason: SURG

## 2022-03-24 RX ORDER — HYDROCODONE BITARTRATE AND ACETAMINOPHEN 5; 325 MG/1; MG/1
2 TABLET ORAL AS NEEDED
Status: DISCONTINUED | OUTPATIENT
Start: 2022-03-24 | End: 2022-03-24

## 2022-03-24 RX ORDER — ONDANSETRON 2 MG/ML
4 INJECTION INTRAMUSCULAR; INTRAVENOUS EVERY 6 HOURS PRN
Status: DISCONTINUED | OUTPATIENT
Start: 2022-03-24 | End: 2022-03-24

## 2022-03-24 RX ORDER — HALOPERIDOL 5 MG/ML
0.25 INJECTION INTRAMUSCULAR ONCE AS NEEDED
Status: DISCONTINUED | OUTPATIENT
Start: 2022-03-24 | End: 2022-03-24

## 2022-03-24 RX ORDER — ONDANSETRON 2 MG/ML
4 INJECTION INTRAMUSCULAR; INTRAVENOUS ONCE AS NEEDED
Status: DISCONTINUED | OUTPATIENT
Start: 2022-03-24 | End: 2022-03-24

## 2022-03-24 RX ORDER — TRAMADOL HYDROCHLORIDE 50 MG/1
50 TABLET ORAL EVERY 6 HOURS PRN
Qty: 10 TABLET | Refills: 0 | Status: SHIPPED | OUTPATIENT
Start: 2022-03-24

## 2022-03-24 RX ORDER — SODIUM CHLORIDE, SODIUM LACTATE, POTASSIUM CHLORIDE, CALCIUM CHLORIDE 600; 310; 30; 20 MG/100ML; MG/100ML; MG/100ML; MG/100ML
INJECTION, SOLUTION INTRAVENOUS CONTINUOUS
Status: DISCONTINUED | OUTPATIENT
Start: 2022-03-24 | End: 2022-03-24

## 2022-03-24 RX ORDER — DEXAMETHASONE SODIUM PHOSPHATE 4 MG/ML
VIAL (ML) INJECTION AS NEEDED
Status: DISCONTINUED | OUTPATIENT
Start: 2022-03-24 | End: 2022-03-24 | Stop reason: SURG

## 2022-03-24 RX ORDER — PROCHLORPERAZINE EDISYLATE 5 MG/ML
5 INJECTION INTRAMUSCULAR; INTRAVENOUS ONCE AS NEEDED
Status: DISCONTINUED | OUTPATIENT
Start: 2022-03-24 | End: 2022-03-24

## 2022-03-24 RX ORDER — MORPHINE SULFATE 4 MG/ML
4 INJECTION, SOLUTION INTRAMUSCULAR; INTRAVENOUS EVERY 10 MIN PRN
Status: DISCONTINUED | OUTPATIENT
Start: 2022-03-24 | End: 2022-03-24

## 2022-03-24 RX ORDER — ACETAMINOPHEN 325 MG/1
650 TABLET ORAL EVERY 8 HOURS PRN
Status: DISCONTINUED | OUTPATIENT
Start: 2022-03-24 | End: 2022-03-24

## 2022-03-24 RX ORDER — FAMOTIDINE 20 MG/1
20 TABLET, FILM COATED ORAL ONCE
Status: COMPLETED | OUTPATIENT
Start: 2022-03-24 | End: 2022-03-24

## 2022-03-24 RX ORDER — IBUPROFEN 200 MG
600 TABLET ORAL EVERY 6 HOURS PRN
Qty: 40 TABLET | Refills: 0 | Status: SHIPPED | OUTPATIENT
Start: 2022-03-24

## 2022-03-24 RX ORDER — HYDROCODONE BITARTRATE AND ACETAMINOPHEN 5; 325 MG/1; MG/1
1 TABLET ORAL AS NEEDED
Status: DISCONTINUED | OUTPATIENT
Start: 2022-03-24 | End: 2022-03-24

## 2022-03-24 RX ORDER — MORPHINE SULFATE 10 MG/ML
6 INJECTION, SOLUTION INTRAMUSCULAR; INTRAVENOUS EVERY 10 MIN PRN
Status: DISCONTINUED | OUTPATIENT
Start: 2022-03-24 | End: 2022-03-24

## 2022-03-24 RX ORDER — LIDOCAINE HYDROCHLORIDE 10 MG/ML
INJECTION, SOLUTION EPIDURAL; INFILTRATION; INTRACAUDAL; PERINEURAL AS NEEDED
Status: DISCONTINUED | OUTPATIENT
Start: 2022-03-24 | End: 2022-03-24 | Stop reason: SURG

## 2022-03-24 RX ORDER — HYDROMORPHONE HYDROCHLORIDE 1 MG/ML
0.6 INJECTION, SOLUTION INTRAMUSCULAR; INTRAVENOUS; SUBCUTANEOUS EVERY 5 MIN PRN
Status: DISCONTINUED | OUTPATIENT
Start: 2022-03-24 | End: 2022-03-24

## 2022-03-24 RX ORDER — ACETAMINOPHEN 500 MG
1000 TABLET ORAL ONCE
Status: COMPLETED | OUTPATIENT
Start: 2022-03-24 | End: 2022-03-24

## 2022-03-24 RX ADMIN — LIDOCAINE HYDROCHLORIDE 50 MG: 10 INJECTION, SOLUTION EPIDURAL; INFILTRATION; INTRACAUDAL; PERINEURAL at 10:07:00

## 2022-03-24 RX ADMIN — SODIUM CHLORIDE, SODIUM LACTATE, POTASSIUM CHLORIDE, CALCIUM CHLORIDE: 600; 310; 30; 20 INJECTION, SOLUTION INTRAVENOUS at 10:22:00

## 2022-03-24 RX ADMIN — MIDAZOLAM HYDROCHLORIDE 2 MG: 1 INJECTION INTRAMUSCULAR; INTRAVENOUS at 10:04:00

## 2022-03-24 RX ADMIN — SODIUM CHLORIDE, SODIUM LACTATE, POTASSIUM CHLORIDE, CALCIUM CHLORIDE: 600; 310; 30; 20 INJECTION, SOLUTION INTRAVENOUS at 10:04:00

## 2022-03-24 RX ADMIN — CEFAZOLIN SODIUM/WATER 2 G: 2 G/20 ML SYRINGE (ML) INTRAVENOUS at 10:15:00

## 2022-03-24 RX ADMIN — ONDANSETRON 4 MG: 2 INJECTION INTRAMUSCULAR; INTRAVENOUS at 10:25:00

## 2022-03-24 RX ADMIN — DEXAMETHASONE SODIUM PHOSPHATE 4 MG: 4 MG/ML VIAL (ML) INJECTION at 10:25:00

## 2022-03-24 RX ADMIN — SODIUM CHLORIDE, SODIUM LACTATE, POTASSIUM CHLORIDE, CALCIUM CHLORIDE: 600; 310; 30; 20 INJECTION, SOLUTION INTRAVENOUS at 10:48:00

## 2022-03-24 NOTE — ANESTHESIA PROCEDURE NOTES
Airway  Date/Time: 3/24/2022 10:12 AM  Urgency: elective    Airway not difficult    General Information and Staff    Patient location during procedure: OR  Anesthesiologist: Doug Subramanian MD  Resident/CRNA: Tess Fulton CRNA  Performed: anesthesiologist and CRNA     Indications and Patient Condition  Indications for airway management: anesthesia  Spontaneous ventilation: present  Sedation level: deep  Preoxygenated: yes  Patient position: sniffing  MILS maintained throughout  Mask difficulty assessment: 0 - not attempted  No planned trial extubation    Final Airway Details  Final airway type: supraglottic airway      Successful airway: classic  Size 3      Number of attempts at approach: 1  Number of other approaches attempted: 0

## 2022-03-24 NOTE — INTERVAL H&P NOTE
Pre-op Diagnosis: Degenerative tear of left medial meniscus [M23.204]  Chondromalacia, left knee [M94.262]    The above referenced H&P was reviewed by Myla Garcia MD on 3/24/2022, the patient was examined and no significant changes have occurred in the patient's condition since the H&P was performed. I discussed with the patient and/or legal representative the potential benefits, risks and side effects of this procedure; the likelihood of the patient achieving goals; and potential problems that might occur during recuperation. I discussed reasonable alternatives to the procedure, including risks, benefits and side effects related to the alternatives and risks related to not receiving this procedure. We will proceed with procedure as planned.

## 2022-03-24 NOTE — BRIEF OP NOTE
Pre-Operative Diagnosis: Degenerative tear of left medial meniscus [M23.204]  Chondromalacia, left knee [M94.262]     Post-Operative Diagnosis: Left knee 1) degenerative tear medial meniscus [M23.204], 2) synovitis medial and lateral compartments    Procedure Performed:   left knee arthroscopy, partial medial meniscectomy, medial and lateral synovectomy    Surgeon(s) and Role: Jeannette Paz MD - Primary    Assistant(s):  Surgical Assistant.: Steven Palmer     Anesthesia: Dr. Nisreen Lawton with LMA     Surgical Findings: Tourniquet 14 minutes at 300 mmHg. Lateral compartment normal.  ACL intact. Severe chondromalacia patellofemoral and medial compartments. Posterior medial meniscus tear as seen on MRI. Synovitis both medial and lateral debrided with combination of shaver and ArthroWand. Drain: None  Specimen: None   Complications: None  Estimated Blood Loss: 3 cc  Stable to PACU.     Alma Cha MD  3/24/2022  10:50 AM

## 2022-03-24 NOTE — ANESTHESIA POSTPROCEDURE EVALUATION
Patient: Josefa Burkitt    Procedure Summary     Date: 03/24/22 Room / Location: [facility] MAIN OR 17 / [facility] MAIN OR    Anesthesia Start: 1004 Anesthesia Stop:     Procedure: left knee arthroscopy, partial medial meniscectomy, medial and lateral synovectomy (Left Knee) Diagnosis:       Degenerative tear of left medial meniscus      Chondromalacia, left knee      Synovitis of left knee      (Degenerative tear of left medial meniscus [M23.204]Chondromalacia, left knee [Q59.257])    Surgeons: Becca Barone MD Anesthesiologist: Roseanna Goetz MD    Anesthesia Type: general ASA Status: 2          Anesthesia Type: general    Vitals Value Taken Time   /91 03/24/22 1106   Temp 97.1 03/24/22 1108   Pulse 123 03/24/22 1107   Resp 18 03/24/22 1107   SpO2 94 % 03/24/22 1107   Vitals shown include unvalidated device data.     [address] [facility] AN Post Evaluation:   Patient Evaluated in PACU  Patient Participation: complete - patient participated  Level of Consciousness: awake  Pain Score: 0  Pain Management: adequate  Airway Patency:patent  Dental exam unchanged from preop  Yes    Cardiovascular Status: acceptable  Respiratory Status: acceptable  Postoperative Hydration acceptable      [address], [place]  3/24/2022 11:08 AM

## 2022-03-25 NOTE — OPERATIVE REPORT
[facility] [place]    PATIENT'S NAME: Antonio Galarza   ATTENDING PHYSICIAN: Alexsandra Mensah MD   OPERATING PHYSICIAN: Janee Mensah MD   PATIENT ACCOUNT#:   [id number]    LOCATION:  [address]  MEDICAL RECORD #:   [id number]       YOB: 1960  ADMISSION DATE:       03/24/2022      OPERATION DATE:  03/24/2022    OPERATIVE REPORT    PREOPERATIVE DIAGNOSIS:  Left knee:  1. Degenerative tear, medial meniscus. 2.   Chondromalacia, left knee. POSTOPERATIVE DIAGNOSIS:  Left knee:  1. Degenerative tear, medial meniscus. 2.   Synovitis, medial and lateral compartments. PROCEDURE:  Arthroscopy, left knee with:  1. Partial medial meniscectomy. 2.   Medial and lateral synovectomies. ASSISTANT:  MICHELLE Awan. ANESTHESIA:  Dr. Kizzy Gutiérrez with LMA. INDICATIONS:  Patient is a 57-year-old woman with the above diagnoses documented by physical exam, x-ray, and MRI. She is between a knee replacement but did not feel comfortable proceeding to that surgery. Her goal is to try to obtain some relief for least 1 year, then proceeding with knee replacement based on her job plans. As she had only posteromedial joint line pain with a medial meniscus tear, I thought it was reasonable to try arthroscopy despite the chondromalacia. The risks and complications of surgery were discussed. No guarantees were given. The consent was signed. With an obesity code of  33.64, a surgical assistant was medically necessary. He provided safe transfer to the operative table and placement of the tourniquet and [place] leg salazar safely. He then provided manipulation of the leg and equipment as necessary for safe completion of the operation. He provided superficial closure, dressings, and safe transfer to the recovery room. In this particular case with the BMI of 33.64, without a surgical assistant, the surgery would have been far more difficult and less safe.   Therefore, he was medically necessary. OPERATIVE TECHNIQUE:  Patient was brought to the operating room, placed in the supine position. Appropriate IV access and monitors were placed. Ancef 2 g IV was given for prophylaxis. SCD boot was on the right leg. General laryngeal mask anesthesia was performed Dr. Rodrigo Paulson. A [place] leg salazar was placed on the left leg over the tourniquet. A padded bump was placed for the right leg. The end of the table was let down and the left lower extremity was then prepped draped in sterile fashion with ChloraPrep. The leg was exsanguinated and tourniquet inflated to 300 mmHg. Tourniquet time for the case was 14 minutes. Portals were created and the suprapatellar pouch was unremarkable, although there were small flecks of floating cartilage that went out the outflow cannula. No large loose body was seen. The ACL was intact. The lateral compartment was normal with regard to the cartilage surfaces and meniscus. There was synovitis, however, in the anterior lateral compartment. This was debrided with a shaver and ArthroWand. The medial compartment also had recurrent synovitis and pictures were taken. This was debrided with a combination of shaver and ArthroWand. The posterior medial meniscus tear was identified with a probe on the undersurface and the posteromedial corner was resected with a combination of basket, shaver, and ArthroWand. The majority of the meniscus was able to be preserved. There was significant chondromalacia of the medial femoral condyle and tibial plateau. No loose cartilage was seen and therefore, no chondroplasty was done. The medial gutter was unremarkable, and the plica was not tight. The scope was taken through the rest of the knee again to ensure no loose fragments were found. The equipment was removed and the wounds closed with nylon suture. The knee was injected with Naropin 0.5%, 30 mL for postop pain relief.   Sterile dressings were applied, and she was awakened, extubated, and brought to the recovery room in stable condition. Postop instructions were given to her and her daughter in both written and oral form. She was given tramadol, ibuprofen, and Tylenol for postop pain relief. She was placed on aspirin 81 mg twice a day for 12 days for DVT prophylaxis. She will follow up Dr. Wang Pereyra in 2 weeks' time in the office for suture removal and range of motion check. Blood loss was 3 mL. No specimens sent. No drains used. No complications noted. Patient tolerated the procedure well. Dictated By Eleazar Sanchez.  Wang Pereyra MD  d: 03/24/2022 11:06:15  t: 03/24/2022 13:13:56  Job [id number]  [facility]/    cc: Sarah Roblero MD

## 2022-04-04 ENCOUNTER — OFFICE VISIT (OUTPATIENT)
Dept: ORTHOPEDICS CLINIC | Facility: CLINIC | Age: 62
End: 2022-04-04
Payer: COMMERCIAL

## 2022-04-04 ENCOUNTER — TELEPHONE (OUTPATIENT)
Dept: PHYSICAL THERAPY | Facility: HOSPITAL | Age: 62
End: 2022-04-04

## 2022-04-04 DIAGNOSIS — E66.01 CLASS 2 SEVERE OBESITY DUE TO EXCESS CALORIES WITH SERIOUS COMORBIDITY AND BODY MASS INDEX (BMI) OF 35.0 TO 35.9 IN ADULT (HCC): ICD-10-CM

## 2022-04-04 DIAGNOSIS — M23.204 DEGENERATIVE TEAR OF LEFT MEDIAL MENISCUS: Primary | ICD-10-CM

## 2022-04-04 DIAGNOSIS — M94.262 CHONDROMALACIA, LEFT KNEE: ICD-10-CM

## 2022-04-04 PROCEDURE — 99024 POSTOP FOLLOW-UP VISIT: CPT | Performed by: ORTHOPAEDIC SURGERY

## 2022-04-05 ENCOUNTER — HOSPITAL ENCOUNTER (OUTPATIENT)
Dept: MAMMOGRAPHY | Facility: HOSPITAL | Age: 62
Discharge: HOME | End: 2022-04-05
Attending: PHYSICIAN ASSISTANT
Payer: COMMERCIAL

## 2022-04-05 DIAGNOSIS — Z12.31 ENCOUNTER FOR SCREENING MAMMOGRAM FOR BREAST CANCER: ICD-10-CM

## 2022-04-05 PROCEDURE — 77063 BREAST TOMOSYNTHESIS BI: CPT | Performed by: PHYSICIAN ASSISTANT

## 2022-04-05 PROCEDURE — 77067 SCR MAMMO BI INCL CAD: CPT | Performed by: PHYSICIAN ASSISTANT

## 2022-04-14 ENCOUNTER — TELEPHONE (OUTPATIENT)
Dept: PHYSICAL THERAPY | Facility: HOSPITAL | Age: 62
End: 2022-04-14

## 2022-04-18 ENCOUNTER — OFFICE VISIT (OUTPATIENT)
Dept: PHYSICAL THERAPY | Age: 62
End: 2022-04-18
Attending: ORTHOPAEDIC SURGERY
Payer: COMMERCIAL

## 2022-04-18 DIAGNOSIS — M23.204 DEGENERATIVE TEAR OF LEFT MEDIAL MENISCUS: ICD-10-CM

## 2022-04-18 DIAGNOSIS — M94.262 CHONDROMALACIA, LEFT KNEE: ICD-10-CM

## 2022-04-18 PROCEDURE — 97162 PT EVAL MOD COMPLEX 30 MIN: CPT | Performed by: PHYSICAL THERAPIST

## 2022-04-18 PROCEDURE — 97110 THERAPEUTIC EXERCISES: CPT | Performed by: PHYSICAL THERAPIST

## 2022-04-20 ENCOUNTER — OFFICE VISIT (OUTPATIENT)
Dept: PHYSICAL THERAPY | Age: 62
End: 2022-04-20
Payer: COMMERCIAL

## 2022-04-20 PROCEDURE — 97110 THERAPEUTIC EXERCISES: CPT | Performed by: PHYSICAL THERAPIST

## 2022-04-20 NOTE — PROGRESS NOTES
Diagnosis:    L knee menisectomy    Referring Provider: Dr. Kristin Melgar Date of Evaluation:    4/20/2022    Precautions:  None Next MD visit:   none scheduled  Date of Surgery:  March 24, 2020   Insurance (Authorized # of Visits): 2            Subjective: Patient reports knee is still sore. Has a question about one of the exercises. Pain: 2-3/10      Objective: see outpatient daily  Record  AROM 0-128      Assessment: Patient has made improvement with AROM into flexion from 120-128 from initial session. Exercises reviewed and added further exercises for home. Goals:   Goals: (to be met in 8-12 visits)  1. Patient to have equal ROM L to R knee to be able to walk without difficulty on community basis. 2. Patient to have equal strength L to R knee to be able to go up/down stairs with normal pattern, be able to get up/down from floor  3. Patient to be independent with HEP. 4. Patient to be able to walk up to 1-2 miles without limitations    Plan: Continue ROM, strengthening, balance work, advance as able. Date: 4/20/2022  TX#: 2/   Nu step 5 min level 5  SAQ 3# 2 x 15 reps  SLR 1# 2 x 12 reps  Heel slides x 20  Side glut med RTB 2 x 15  Prone knee flexion/stretch by therapist x 2  Seated HS curls RTB x 15  Shuttle leg press double 6B x 15  Shuttle leg press single 4B 2 x 12  Standing hip abd/ext/flex RTB x 12 each  Balance work air ex with cone touches  Sit to stand x 10  Step up frwd x 5 6\"  Step up side x 5 6'     HEP: seated HS, sit to stand, step up frwd/side , single leg balance.     Charges: Ex 3      Total Timed Treatment: 45 min  Total Treatment Time: 45 min

## 2022-04-21 ENCOUNTER — APPOINTMENT (OUTPATIENT)
Dept: PHYSICAL THERAPY | Age: 62
End: 2022-04-21
Attending: ORTHOPAEDIC SURGERY
Payer: COMMERCIAL

## 2022-04-28 ENCOUNTER — LAB ENCOUNTER (OUTPATIENT)
Dept: LAB | Facility: HOSPITAL | Age: 62
End: 2022-04-28
Attending: INTERNAL MEDICINE
Payer: COMMERCIAL

## 2022-04-28 ENCOUNTER — OFFICE VISIT (OUTPATIENT)
Dept: SURGERY | Facility: CLINIC | Age: 62
End: 2022-04-28
Payer: COMMERCIAL

## 2022-04-28 VITALS
RESPIRATION RATE: 16 BRPM | WEIGHT: 198.5 LBS | DIASTOLIC BLOOD PRESSURE: 82 MMHG | SYSTOLIC BLOOD PRESSURE: 139 MMHG | HEIGHT: 65 IN | HEART RATE: 76 BPM | OXYGEN SATURATION: 100 % | BODY MASS INDEX: 33.07 KG/M2

## 2022-04-28 DIAGNOSIS — E66.9 OBESITY (BMI 30-39.9): ICD-10-CM

## 2022-04-28 DIAGNOSIS — E55.9 VITAMIN D DEFICIENCY: ICD-10-CM

## 2022-04-28 DIAGNOSIS — R63.2 INCREASED APPETITE: ICD-10-CM

## 2022-04-28 DIAGNOSIS — R63.5 WEIGHT GAIN: ICD-10-CM

## 2022-04-28 DIAGNOSIS — E78.5 DYSLIPIDEMIA: ICD-10-CM

## 2022-04-28 DIAGNOSIS — E78.5 DYSLIPIDEMIA: Primary | ICD-10-CM

## 2022-04-28 LAB
FOLATE SERPL-MCNC: 6.7 NG/ML (ref 8.7–?)
VIT B12 SERPL-MCNC: 201 PG/ML (ref 193–986)
VIT D+METAB SERPL-MCNC: 30.7 NG/ML (ref 30–100)

## 2022-04-28 PROCEDURE — 82306 VITAMIN D 25 HYDROXY: CPT

## 2022-04-28 PROCEDURE — 3075F SYST BP GE 130 - 139MM HG: CPT | Performed by: INTERNAL MEDICINE

## 2022-04-28 PROCEDURE — 99204 OFFICE O/P NEW MOD 45 MIN: CPT | Performed by: INTERNAL MEDICINE

## 2022-04-28 PROCEDURE — 36415 COLL VENOUS BLD VENIPUNCTURE: CPT

## 2022-04-28 PROCEDURE — 3079F DIAST BP 80-89 MM HG: CPT | Performed by: INTERNAL MEDICINE

## 2022-04-28 PROCEDURE — 3008F BODY MASS INDEX DOCD: CPT | Performed by: INTERNAL MEDICINE

## 2022-04-28 PROCEDURE — 82397 CHEMILUMINESCENT ASSAY: CPT

## 2022-04-28 PROCEDURE — 82746 ASSAY OF FOLIC ACID SERUM: CPT

## 2022-04-28 PROCEDURE — 82607 VITAMIN B-12: CPT

## 2022-04-28 RX ORDER — TOPIRAMATE 25 MG/1
25 TABLET ORAL EVERY EVENING
Qty: 30 TABLET | Refills: 2 | Status: SHIPPED | OUTPATIENT
Start: 2022-04-28

## 2022-05-01 LAB — LEPTIN: 26 NG/ML

## 2022-05-02 ENCOUNTER — OFFICE VISIT (OUTPATIENT)
Dept: PHYSICAL THERAPY | Age: 62
End: 2022-05-02
Attending: ORTHOPAEDIC SURGERY
Payer: COMMERCIAL

## 2022-05-02 PROCEDURE — 97110 THERAPEUTIC EXERCISES: CPT | Performed by: PHYSICAL THERAPIST

## 2022-05-02 RX ORDER — CYANOCOBALAMIN 1000 UG/ML
1000 INJECTION INTRAMUSCULAR; SUBCUTANEOUS
Qty: 12 EACH | Refills: 0 | Status: SHIPPED | OUTPATIENT
Start: 2022-05-02

## 2022-05-02 NOTE — PROGRESS NOTES
Diagnosis:    L knee menisectomy    Referring Provider: Dr. Bishop Foss Date of Evaluation:    4/20/2022    Precautions:  None Next MD visit:   none scheduled  Date of Surgery:  March 24, 2020   Insurance (Authorized # of Visits):  3            Subjective: Patient reports knee has been a little more sore over the weekend. Today 2-3/10 level. States didn't do much of the exercise over the weekend. .     Pain: 3/10      Objective: see outpatient daily  Record  AROM 0-133      Assessment: Continued improvement with AROM flexion at L knee. Continuing to work on strengthening. Patient reporting decrease in pain end of session. Goals:   Goals: (to be met in 8-12 visits)  1. Patient to have equal ROM L to R knee to be able to walk without difficulty on community basis. 2. Patient to have equal strength L to R knee to be able to go up/down stairs with normal pattern, be able to get up/down from floor  3. Patient to be independent with HEP. 4. Patient to be able to walk up to 1-2 miles without limitations    Plan: Continue ROM, strengthening, balance work, advance as able. Date: 5/2/2022  TX#: 3   Nu step 6 min level 5  SAQ 3# 2 x 15 reps  SLR 2# 2 x 10 reps  Heel slide  AROM  133 L knee  Prone knee flexion/stretch by therapist x 2  Seated HS curls RTB x 20  Shuttle leg press double 7B 2 x 20  Shuttle leg press single 5B 2 x 10  Standing hip abd/ext/ RTB x 15 each abd, x 10 each extension  Lunges to Bosu x 10 each  Balance work air ex with cone touches 5 rounds 2 sec hold, 3 cones  Tandem stand floor x 10 sec, air ex x 10 sec each  Step up frwd x 15 6\"  Step up side x 15 4'     HEP:no changes.     Charges: Ex 3      Total Timed Treatment: 45 min  Total Treatment Time: 45 min

## 2022-05-04 ENCOUNTER — OFFICE VISIT (OUTPATIENT)
Dept: PHYSICAL THERAPY | Age: 62
End: 2022-05-04
Attending: ORTHOPAEDIC SURGERY
Payer: COMMERCIAL

## 2022-05-04 PROCEDURE — 97110 THERAPEUTIC EXERCISES: CPT | Performed by: PHYSICAL THERAPIST

## 2022-05-04 NOTE — PROGRESS NOTES
Diagnosis:    L knee menisectomy    Referring Provider: Dr. Geo Galarza Date of Evaluation:    4/20/2022    Precautions:  None Next MD visit:   none scheduled  Date of Surgery:  March 24, 2020   Insurance (Authorized # of Visits): 4            Subjective: Patient reports some pain front of knee yesterday, today none. Pain 1/10    Pain: 1/10      Objective: see outpatient daily  Record  AROM 0-133      Assessment: Patient with less reported pain this session , able to add further balance work. Patient reporting knee feeling better end of session. Still more difficulty with balance L to R LE and strength. Goals:   Goals: (to be met in 8-12 visits)  1. Patient to have equal ROM L to R knee to be able to walk without difficulty on community basis. 2. Patient to have equal strength L to R knee to be able to go up/down stairs with normal pattern, be able to get up/down from floor  3. Patient to be independent with HEP. 4. Patient to be able to walk up to 1-2 miles without limitations    Plan: Continue ROM, strengthening, balance work, advance as able. Date: 5/4/2022  TX#: 4   Nu step 6 min level 5  SAQ 4# 2 x 15 reps  SLR 2# 2 x 10 reps  Seated HS curls RTB x 20  Shuttle leg press double 7B 2 x 20  Shuttle leg press single 6B 2 x 10  Standing hip abd/ext/ RTB x 15 each abd, x 10 each extension, flexion x 10  Lunges to Bosu x 10 each forward, side x 10 reps  Balance work air ex with cone touches 5 rounds 3 sec hold, 3 cones  Tandem stand air ex beam 2 each for 10 se6  Step up frwd x 15 6\"  Step up side x 15- 6\" x 5, 4\" x 10  Wobble board s/s a/p x 45 sec each     HEP:no changes.     Charges: Ex 3      Total Timed Treatment: 45 min  Total Treatment Time: 45 min

## 2022-05-09 ENCOUNTER — OFFICE VISIT (OUTPATIENT)
Dept: PHYSICAL THERAPY | Age: 62
End: 2022-05-09
Attending: ORTHOPAEDIC SURGERY
Payer: COMMERCIAL

## 2022-05-09 PROCEDURE — 97110 THERAPEUTIC EXERCISES: CPT | Performed by: PHYSICAL THERAPIST

## 2022-05-09 NOTE — PROGRESS NOTES
Diagnosis:    L knee menisectomy    Referring Provider: Dr. Nathalia Gant Date of Evaluation:    4/20/2022    Precautions:  None Next MD visit:   none scheduled  Date of Surgery:  March 24, 2020   Insurance (Authorized # of Visits): 5            Subjective: Patient reports light pain 1-2/10 today    Pain: 1-2/10      Objective: see outpatient daily  Record  AROM 0-133      Assessment: Added bosu to trampoline and single leg with ball toss to trampoline, continued strengthening and balance work. Goals:   Goals: (to be met in 8-12 visits)  1. Patient to have equal ROM L to R knee to be able to walk without difficulty on community basis. 2. Patient to have equal strength L to R knee to be able to go up/down stairs with normal pattern, be able to get up/down from floor  3. Patient to be independent with HEP. 4. Patient to be able to walk up to 1-2 miles without limitations    Plan: Continue ROM, strengthening, balance work, advance as able. Date: 5/9/2022  TX#: 5   Nu step 6 min level 5  SAQ 4# 2 x 15 reps  SLR 2# 2 x 15 reps  Seated HS curls RTB x 20  Shuttle leg press double 8B 2 x 20  Shuttle leg press single 6B 2 x 15  Side steps with RTB x 15 each  Lunges to Bosu x 10 each forward, side x 10 reps  bosu to trampoline steps x 12 L  Single leg balance with L with ball toss trampoline 2# 2 x 10  Balance work air ex with cone touches 5 rounds 3 sec hold, 3 cones  Tandem stand air ex beam 2 each for 10 se6  Step up frwd x 15 6\"  Step up side x 15- 6\" x 10  Wobble board s/s a/p x 1 minute each     HEP:no changes.     Charges: Ex 3      Total Timed Treatment: 45 min  Total Treatment Time: 45 min

## 2022-05-11 ENCOUNTER — OFFICE VISIT (OUTPATIENT)
Dept: OBGYN CLINIC | Facility: CLINIC | Age: 62
End: 2022-05-11
Payer: COMMERCIAL

## 2022-05-11 VITALS
DIASTOLIC BLOOD PRESSURE: 87 MMHG | BODY MASS INDEX: 32.82 KG/M2 | HEIGHT: 64.5 IN | HEART RATE: 80 BPM | SYSTOLIC BLOOD PRESSURE: 135 MMHG | WEIGHT: 194.63 LBS

## 2022-05-11 DIAGNOSIS — Z01.419 ENCOUNTER FOR GYNECOLOGICAL EXAMINATION WITHOUT ABNORMAL FINDING: Primary | ICD-10-CM

## 2022-05-11 DIAGNOSIS — Z12.4 SCREENING FOR MALIGNANT NEOPLASM OF CERVIX: ICD-10-CM

## 2022-05-11 PROCEDURE — 99386 PREV VISIT NEW AGE 40-64: CPT | Performed by: OBSTETRICS & GYNECOLOGY

## 2022-05-11 PROCEDURE — 3079F DIAST BP 80-89 MM HG: CPT | Performed by: OBSTETRICS & GYNECOLOGY

## 2022-05-11 PROCEDURE — 3075F SYST BP GE 130 - 139MM HG: CPT | Performed by: OBSTETRICS & GYNECOLOGY

## 2022-05-11 PROCEDURE — 3008F BODY MASS INDEX DOCD: CPT | Performed by: OBSTETRICS & GYNECOLOGY

## 2022-05-12 ENCOUNTER — OFFICE VISIT (OUTPATIENT)
Dept: PHYSICAL THERAPY | Age: 62
End: 2022-05-12
Attending: ORTHOPAEDIC SURGERY
Payer: COMMERCIAL

## 2022-05-12 PROCEDURE — 97110 THERAPEUTIC EXERCISES: CPT | Performed by: PHYSICAL THERAPIST

## 2022-05-12 NOTE — PROGRESS NOTES
Diagnosis:    L knee menisectomy    Referring Provider: Dr. Kristin Melgar Date of Evaluation:    4/20/2022    Precautions:  None Next MD visit:   none scheduled  Date of Surgery:  March 24, 2020   Insurance (Authorized # of Visits): 6            Subjective: Patient reports pain up the last 2 days on inside of knee just slightly below the knee    Pain: 3-4/10      Objective: see outpatient daily  Record  AROM 0-133      Assessment: Use of ultrasound today as patient with pain and swelling area of pes anserine - patient reporting less pain end of session. Will use for next 2-3 visits for pain relief. Goals:   Goals: (to be met in 8-12 visits)  1. Patient to have equal ROM L to R knee to be able to walk without difficulty on community basis. 2. Patient to have equal strength L to R knee to be able to go up/down stairs with normal pattern, be able to get up/down from floor  3. Patient to be independent with HEP. 4. Patient to be able to walk up to 1-2 miles without limitations    Plan: Continue ROM, strengthening, balance work, advance as able. Use of modalities as needed. Date: 5/12/2022  TX#: 6   Nu step 5 min level 5  ultrasound x 8 min pes anserine region L knee at 1.4 w/cm2 cont  Seated HS curls RTB x 20  Shuttle leg press double 8B  x 20  Shuttle leg press single 6B 2 x 15  Side steps with RTB x 15 each  Lunges to Bosu x 10 each forward, side x 10 reps  bosu to trampoline steps x 12 L  Single leg balance with L with ball toss trampoline 2# 2 x 10  Balance work air ex with cone touches 5 rounds 3 sec hold, 3 cones  Step up frwd x 10 6\"  Step up side x 10- 6\"   Wobble board s/s a/p x 1 minute each     HEP:no changes.     Charges: Ex 3      Total Timed Treatment: 45 min  Total Treatment Time: 45 min

## 2022-05-13 LAB — HPV I/H RISK 1 DNA SPEC QL NAA+PROBE: NEGATIVE

## 2022-05-16 ENCOUNTER — OFFICE VISIT (OUTPATIENT)
Dept: PHYSICAL THERAPY | Age: 62
End: 2022-05-16
Attending: ORTHOPAEDIC SURGERY
Payer: COMMERCIAL

## 2022-05-16 PROCEDURE — 97110 THERAPEUTIC EXERCISES: CPT | Performed by: PHYSICAL THERAPIST

## 2022-05-16 PROCEDURE — 97035 APP MDLTY 1+ULTRASOUND EA 15: CPT | Performed by: PHYSICAL THERAPIST

## 2022-05-16 NOTE — PROGRESS NOTES
Diagnosis:    L knee menisectomy    Referring Provider: Dr. Vasiliy Fuentes Date of Evaluation:    4/20/2022    Precautions:  None Next MD visit:   none scheduled  Date of Surgery:  March 24, 2020   Insurance (Authorized # of Visits): 7            Subjective: Patient reports pain just mild today as was at another location for work and had to do a lot of stairs. Pain: 1-2/10      Objective: see outpatient daily  Record  AROM 0-133      Assessment: Used ultrasound again this session as patient reporting a decrease in pain after last use. Continued strengthening, ROM work. Did not do steps this session as patient had already done several in her day. Goals:   Goals: (to be met in 8-12 visits)  1. Patient to have equal ROM L to R knee to be able to walk without difficulty on community basis. 2. Patient to have equal strength L to R knee to be able to go up/down stairs with normal pattern, be able to get up/down from floor  3. Patient to be independent with HEP. 4. Patient to be able to walk up to 1-2 miles without limitations    Plan: Continue ROM, strengthening, balance work, advance as able. Use of modalities as needed. Date: 5/16/2022  TX#: 7   Nu step 6 min level 5  ultrasound x 8 min pes anserine region L knee at 1.4 w/cm2 cont  SAQ 3# 2 x 15  Seated HS curls RTB 2 x 15  Shuttle leg press double 8B  x 20  Shuttle leg press single 6B 2 x 15  Hip abd x 15 each, x 10 hip ext each  Lunges to Bosu x 10 each forward, side x 10 reps  Balance work air ex with cone touches 5 rounds 3 sec hold, 3 cones     HEP:no changes.     Charges: ultrasound, Ex 2      Total Timed Treatment: 45 min  Total Treatment Time: 45 min

## 2022-05-17 ENCOUNTER — APPOINTMENT (OUTPATIENT)
Dept: PHYSICAL THERAPY | Age: 62
End: 2022-05-17
Attending: INTERNAL MEDICINE
Payer: COMMERCIAL

## 2022-05-18 ENCOUNTER — APPOINTMENT (OUTPATIENT)
Dept: PHYSICAL THERAPY | Age: 62
End: 2022-05-18
Attending: ORTHOPAEDIC SURGERY
Payer: COMMERCIAL

## 2022-05-19 ENCOUNTER — OFFICE VISIT (OUTPATIENT)
Dept: PHYSICAL THERAPY | Age: 62
End: 2022-05-19
Attending: INTERNAL MEDICINE
Payer: COMMERCIAL

## 2022-05-19 PROCEDURE — 97110 THERAPEUTIC EXERCISES: CPT | Performed by: PHYSICAL THERAPIST

## 2022-05-19 PROCEDURE — 97035 APP MDLTY 1+ULTRASOUND EA 15: CPT | Performed by: PHYSICAL THERAPIST

## 2022-05-19 NOTE — PROGRESS NOTES
Diagnosis:    L knee menisectomy    Referring Provider: Dr. Juan Iglesias Date of Evaluation:    4/20/2022    Precautions:  None Next MD visit:   none scheduled  Date of Surgery:  March 24, 2020   Insurance (Authorized # of Visits): 8            Subjective: Patient reports knee mild pain today 1-2/10. Pain: 1-2/10      Objective: see outpatient daily  Record  AROM 0-133      Assessment: Patient pain levels remaining low, continuing to advance with strengthening. Increased resistance with shuttle single leg this session, added bosu to trampoline steps and single leg squat downs. Goals:   Goals: (to be met in 8-12 visits)  1. Patient to have equal ROM L to R knee to be able to walk without difficulty on community basis. 2. Patient to have equal strength L to R knee to be able to go up/down stairs with normal pattern, be able to get up/down from floor  3. Patient to be independent with HEP. 4. Patient to be able to walk up to 1-2 miles without limitations    Plan: Continue ROM, strengthening, balance work, advance as able. Use of modalities as needed. Date: 5/19/2022  TX#: 8   Nu step 6 min level 5  ultrasound x 8 min pes anserine region L knee at 1.4 w/cm2 cont  SAQ 4# 2 x 20  Seated HS curls GTB 2 x 15  Shuttle leg press double 8B  x 20  Shuttle leg press single 7B 2 x 12  Hip abd x 15 each, x 10 hip ext each GTB  Lunges to Bosu x 12 each forward, side x 12 reps  Balance work air ex with cone touches 5 rounds 3 sec hold, 3 cones  bosu to trampoline x 10  Single leg balance with squat down to large cone on treadmill 2 x 5 reps  Single leg balance with ball toss to trampoline 2 x 10 L leg - R leg may touch down as needed. HEP:no changes.     Charges: ultrasound, Ex 2      Total Timed Treatment: 45 min  Total Treatment Time: 45 min

## 2022-05-23 ENCOUNTER — OFFICE VISIT (OUTPATIENT)
Dept: PHYSICAL THERAPY | Age: 62
End: 2022-05-23
Attending: ORTHOPAEDIC SURGERY
Payer: COMMERCIAL

## 2022-05-23 PROCEDURE — 97035 APP MDLTY 1+ULTRASOUND EA 15: CPT | Performed by: PHYSICAL THERAPIST

## 2022-05-23 PROCEDURE — 97110 THERAPEUTIC EXERCISES: CPT | Performed by: PHYSICAL THERAPIST

## 2022-05-23 NOTE — PROGRESS NOTES
Diagnosis:    L knee menisectomy    Referring Provider: Dr. Detra Closs Date of Evaluation:    4/20/2022    Precautions:  None Next MD visit:   none scheduled  Date of Surgery:  March 24, 2020   Insurance (Authorized # of Visits): 8            Subjective: Patient reports no pain at the knee coming in today. Pain: 0/10      Objective: see outpatient daily  Record  AROM 0-133      Assessment:Continuing to work on balance and LE strengthening. Use of ultrasound continued as patient reporting ongoing reduction in pain at area of knee which has been painful. Goals:   Goals: (to be met in 8-12 visits)  1. Patient to have equal ROM L to R knee to be able to walk without difficulty on community basis. 2. Patient to have equal strength L to R knee to be able to go up/down stairs with normal pattern, be able to get up/down from floor  3. Patient to be independent with HEP. 4. Patient to be able to walk up to 1-2 miles without limitations    Plan: Continue ROM, strengthening, balance work, advance as able. Use of modalities as needed. Date: 5/23/2022  TX#: 8   Nu step 6 min level 5  ultrasound x 8 min pes anserine region L knee at 1.4 w/cm2 cont  SAQ 4# 2 x 15  Seated HS curls RTB 2 x 15  Shuttle leg press double 8B  x 20  Shuttle leg press single 6B 2 x 15  Hip abd x 15 each, x 10 hip ext each- RTB  Lunges to Bosu x 15 each forward, side x 15 reps  Balance work air ex with cone touches 5 rounds 3 sec hold, 3 cones  Wobble board a/p, s/s x 1 min each  Single leg squat down to large cone on treadmill x 5 each leg  Single leg toss to trampoline with 2# ball, SLB as able 2 x 10 reps. HEP:no changes.     Charges: ultrasound, Ex 2      Total Timed Treatment: 45 min  Total Treatment Time: 45 min

## 2022-05-25 ENCOUNTER — APPOINTMENT (OUTPATIENT)
Dept: PHYSICAL THERAPY | Age: 62
End: 2022-05-25
Attending: ORTHOPAEDIC SURGERY
Payer: COMMERCIAL

## 2022-05-26 ENCOUNTER — OFFICE VISIT (OUTPATIENT)
Dept: PHYSICAL THERAPY | Age: 62
End: 2022-05-26
Payer: COMMERCIAL

## 2022-05-26 PROCEDURE — 97110 THERAPEUTIC EXERCISES: CPT | Performed by: PHYSICAL THERAPIST

## 2022-05-26 PROCEDURE — 97035 APP MDLTY 1+ULTRASOUND EA 15: CPT | Performed by: PHYSICAL THERAPIST

## 2022-05-26 NOTE — PROGRESS NOTES
Diagnosis:    L knee menisectomy    Referring Provider: Dr. René Davis Date of Evaluation:    4/20/2022    Precautions:  None Next MD visit:   none scheduled  Date of Surgery:  March 24, 2020   Insurance (Authorized # of Visits): 9            Subjective: Patient reports no pain at the knee coming in today. States has been continuing with the exercises. Pain: 0/10      Objective: see outpatient daily  Record  AROM 0-133      Assessment: Patient continues to improve with balance activities and completing exercises without pain. Goals:   Goals: (to be met in 8-12 visits)  1. Patient to have equal ROM L to R knee to be able to walk without difficulty on community basis. 2. Patient to have equal strength L to R knee to be able to go up/down stairs with normal pattern, be able to get up/down from floor  3. Patient to be independent with HEP. 4. Patient to be able to walk up to 1-2 miles without limitations    Plan: Reassess next session, plan discharge at that time. Date: 5/26/2022  TX#: 9   Nu step 6 min level 6  ultrasound x 8 min pes anserine region L knee at 1.4 w/cm2 cont   SAQ 4# 2 x 15  Seated HS curls RTB 2 x 15  Shuttle leg press double 8B  x 20  Shuttle leg press single 6B 2 x 15  Hip abd x 15 each,  Lunges to Bosu x 15 each forward, side x 10  reps  Balance work air ex with cone touches 5 rounds 3 sec hold, 3 cones  Wobble board a/p, s/s x 1 min each  Single leg squat down to large cone on treadmill2 x 5 each leg  Single leg toss to trampoline with 2# ball, SLB as able 2 x 10 reps. HEP:no changes.     Charges: ultrasound, Ex 2      Total Timed Treatment: 45 min  Total Treatment Time: 45 min

## 2022-06-01 ENCOUNTER — TELEPHONE (OUTPATIENT)
Dept: PHYSICAL THERAPY | Age: 62
End: 2022-06-01

## 2022-06-01 ENCOUNTER — TELEPHONE (OUTPATIENT)
Dept: PHYSICAL THERAPY | Facility: HOSPITAL | Age: 62
End: 2022-06-01

## 2022-06-01 ENCOUNTER — APPOINTMENT (OUTPATIENT)
Dept: PHYSICAL THERAPY | Age: 62
End: 2022-06-01
Attending: INTERNAL MEDICINE

## 2022-06-01 ENCOUNTER — APPOINTMENT (OUTPATIENT)
Dept: PHYSICAL THERAPY | Age: 62
End: 2022-06-01
Attending: ORTHOPAEDIC SURGERY
Payer: COMMERCIAL

## 2022-06-03 ENCOUNTER — APPOINTMENT (OUTPATIENT)
Dept: PHYSICAL THERAPY | Age: 62
End: 2022-06-03
Attending: INTERNAL MEDICINE

## 2022-06-08 ENCOUNTER — OFFICE VISIT (OUTPATIENT)
Dept: PHYSICAL THERAPY | Age: 62
End: 2022-06-08
Attending: ORTHOPAEDIC SURGERY
Payer: COMMERCIAL

## 2022-06-08 ENCOUNTER — APPOINTMENT (OUTPATIENT)
Dept: PHYSICAL THERAPY | Age: 62
End: 2022-06-08
Attending: INTERNAL MEDICINE

## 2022-06-08 PROCEDURE — 97110 THERAPEUTIC EXERCISES: CPT | Performed by: PHYSICAL THERAPIST

## 2022-06-08 NOTE — PROGRESS NOTES
Diagnosis:    L knee menisectomy    Referring Provider: Dr. Ashley Neal Date of Evaluation:    4/20/2022    Precautions:  None Next MD visit:   none scheduled  Date of Surgery:  March 24, 2020   Insurance (Authorized # of Visits): [id number]    DISCHARGE SUMMARY          Subjective: Patient reports overall knee has been good and has been doing all activities. States did do some yard work yesterday and has some light pain today, otherwise has been doing good. Has been able to return to all activities. Pain: 1-2/10      Objective: see outpatient daily  Record  AROM 0-135  MMT:  Hip flex 5/5, hip abd 5/5, hip ext 5/5, HS 5/5, Quad 4+/5  SLB L 40 seconds  FOTO score 69/100 initially 52  LEFS score 61/80    Initially 38/80       Assessment: Patient presently has met all goals set and has returned to all of her daily activities. Pain ranges from 0-1-2/10 levels depending on her activities. Has ongoing HEP she can continue. Goals:   Goals: (to be met in 8-12 visits)  1. Patient to have equal ROM L to R knee to be able to walk without difficulty on community basis. - Met  2. Patient to have equal strength L to R knee to be able to go up/down stairs with normal pattern, be able to get up/down from floor- Met  3. Patient to be independent with HEP. Met  4. Patient to be able to walk up to 1-2 miles without limitations- met up to 1 mile    Plan:Plan to discharge at this time. Date: 6/8/2022  TX#: 10   Nu step 5 min level 6  SAQ 4# 2 x 15  Side glut med RTB 2 x 10  Seated HS curls RTB 2 x 15  Shuttle leg press double 8B  x 20  Shuttle leg press single 6B 2 x 15  Lunges to Bosu x 10 each forward, side x 10  reps  Balance work air ex with cone touches 5 rounds 3 sec hold, 3 cones  Wobble board a/p, s/s x 1 min each  Single leg squat down to large cone on treadmill2 x 5 each leg  Single leg toss to trampoline with 2# ball, SLB as able 2 x 10 reps. HEP:no changes.     Charges: Ex 3      Total Timed Treatment: 45 min  Total Treatment Time: 45 min

## 2022-06-13 ENCOUNTER — APPOINTMENT (OUTPATIENT)
Dept: GENERAL RADIOLOGY | Age: 62
End: 2022-06-13
Attending: EMERGENCY MEDICINE
Payer: COMMERCIAL

## 2022-06-13 ENCOUNTER — HOSPITAL ENCOUNTER (OUTPATIENT)
Age: 62
Discharge: HOME | End: 2022-06-13
Attending: EMERGENCY MEDICINE
Payer: COMMERCIAL

## 2022-06-13 VITALS
SYSTOLIC BLOOD PRESSURE: 159 MMHG | OXYGEN SATURATION: 97 % | RESPIRATION RATE: 20 BRPM | DIASTOLIC BLOOD PRESSURE: 97 MMHG | HEART RATE: 118 BPM | TEMPERATURE: 99 F

## 2022-06-13 DIAGNOSIS — J20.9 ACUTE BRONCHITIS, UNSPECIFIED ORGANISM: Primary | ICD-10-CM

## 2022-06-13 LAB
#MXD IC: 0.7 X10ˆ3/UL (ref 0.1–1)
BUN BLD-MCNC: 12 MG/DL (ref 7–18)
CHLORIDE BLD-SCNC: 100 MMOL/L (ref 98–112)
CO2 BLD-SCNC: 25 MMOL/L (ref 21–32)
CREAT BLD-MCNC: 0.9 MG/DL
DDIMER WHOLE BLOOD: <200 NG/ML DDU (ref ?–400)
GLUCOSE BLD-MCNC: 113 MG/DL (ref 70–99)
HCT VFR BLD AUTO: 43.8 %
HCT VFR BLD CALC: 48 %
HGB BLD-MCNC: 14.2 G/DL
ISTAT IONIZED CALCIUM FOR CHEM 8: 1.28 MMOL/L (ref 1.12–1.32)
LYMPHOCYTES # BLD AUTO: 2.1 X10ˆ3/UL (ref 1–4)
LYMPHOCYTES NFR BLD AUTO: 23 %
MCH RBC QN AUTO: 29.6 PG (ref 26–34)
MCHC RBC AUTO-ENTMCNC: 32.4 G/DL (ref 31–37)
MCV RBC AUTO: 91.4 FL (ref 80–100)
MIXED CELL %: 8.1 %
NEUTROPHILS # BLD AUTO: 6.4 X10ˆ3/UL (ref 1.5–7.7)
NEUTROPHILS NFR BLD AUTO: 68.9 %
PLATELET # BLD AUTO: 290 X10ˆ3/UL (ref 150–450)
POTASSIUM BLD-SCNC: 4.3 MMOL/L (ref 3.6–5.1)
RBC # BLD AUTO: 4.79 X10ˆ6/UL
SARS-COV-2 RNA RESP QL NAA+PROBE: NOT DETECTED
SODIUM BLD-SCNC: 138 MMOL/L (ref 136–145)
TROPONIN I BLD-MCNC: <0.02 NG/ML
WBC # BLD AUTO: 9.2 X10ˆ3/UL (ref 4–11)

## 2022-06-13 PROCEDURE — 93010 ELECTROCARDIOGRAM REPORT: CPT

## 2022-06-13 PROCEDURE — 93005 ELECTROCARDIOGRAM TRACING: CPT

## 2022-06-13 PROCEDURE — 36415 COLL VENOUS BLD VENIPUNCTURE: CPT

## 2022-06-13 PROCEDURE — 93010 ELECTROCARDIOGRAM REPORT: CPT | Performed by: EMERGENCY MEDICINE

## 2022-06-13 PROCEDURE — 85025 COMPLETE CBC W/AUTO DIFF WBC: CPT | Performed by: EMERGENCY MEDICINE

## 2022-06-13 PROCEDURE — 84484 ASSAY OF TROPONIN QUANT: CPT

## 2022-06-13 PROCEDURE — 80047 BASIC METABLC PNL IONIZED CA: CPT

## 2022-06-13 PROCEDURE — 85378 FIBRIN DEGRADE SEMIQUANT: CPT | Performed by: EMERGENCY MEDICINE

## 2022-06-13 PROCEDURE — 71046 X-RAY EXAM CHEST 2 VIEWS: CPT | Performed by: EMERGENCY MEDICINE

## 2022-06-13 PROCEDURE — 94640 AIRWAY INHALATION TREATMENT: CPT

## 2022-06-13 PROCEDURE — 99215 OFFICE O/P EST HI 40 MIN: CPT

## 2022-06-13 PROCEDURE — 99214 OFFICE O/P EST MOD 30 MIN: CPT

## 2022-06-13 RX ORDER — ALBUTEROL SULFATE 90 UG/1
2 AEROSOL, METERED RESPIRATORY (INHALATION) EVERY 4 HOURS PRN
Qty: 1 EACH | Refills: 0 | Status: SHIPPED | OUTPATIENT
Start: 2022-06-13 | End: 2022-07-13

## 2022-06-13 RX ORDER — PREDNISONE 20 MG/1
40 TABLET ORAL ONCE
Status: COMPLETED | OUTPATIENT
Start: 2022-06-13 | End: 2022-06-13

## 2022-06-13 RX ORDER — PREDNISONE 20 MG/1
40 TABLET ORAL DAILY
Qty: 8 TABLET | Refills: 0 | Status: SHIPPED | OUTPATIENT
Start: 2022-06-13 | End: 2022-06-17

## 2022-06-13 RX ORDER — ALBUTEROL SULFATE 2.5 MG/3ML
2.5 SOLUTION RESPIRATORY (INHALATION) ONCE
Status: COMPLETED | OUTPATIENT
Start: 2022-06-13 | End: 2022-06-13

## 2022-06-13 NOTE — ED INITIAL ASSESSMENT (HPI)
PATIENT ARRIVED AMBULATORY TO ROOM C/O SYMPTOMS THAT STARTED 5 DAYS AGO. +PRODUCTIVE COUGH +NASAL CONGESTION. NO N/V/D. EASY NON LABORED RESPIRATIONS.  NO DISTRESS

## 2022-06-15 ENCOUNTER — OFFICE VISIT (OUTPATIENT)
Dept: INTERNAL MEDICINE CLINIC | Facility: CLINIC | Age: 62
End: 2022-06-15
Payer: COMMERCIAL

## 2022-06-15 VITALS
SYSTOLIC BLOOD PRESSURE: 136 MMHG | HEART RATE: 96 BPM | DIASTOLIC BLOOD PRESSURE: 83 MMHG | OXYGEN SATURATION: 94 % | WEIGHT: 192 LBS | BODY MASS INDEX: 32.38 KG/M2 | HEIGHT: 64.5 IN

## 2022-06-15 DIAGNOSIS — R05.9 COUGH: Primary | ICD-10-CM

## 2022-06-15 DIAGNOSIS — E03.8 HYPOTHYROIDISM DUE TO HASHIMOTO'S THYROIDITIS: ICD-10-CM

## 2022-06-15 DIAGNOSIS — E06.3 HYPOTHYROIDISM DUE TO HASHIMOTO'S THYROIDITIS: ICD-10-CM

## 2022-06-15 DIAGNOSIS — K21.9 GASTROESOPHAGEAL REFLUX DISEASE, UNSPECIFIED WHETHER ESOPHAGITIS PRESENT: ICD-10-CM

## 2022-06-15 DIAGNOSIS — J30.9 ALLERGIC RHINITIS, UNSPECIFIED SEASONALITY, UNSPECIFIED TRIGGER: ICD-10-CM

## 2022-06-15 PROCEDURE — 99214 OFFICE O/P EST MOD 30 MIN: CPT | Performed by: INTERNAL MEDICINE

## 2022-06-15 PROCEDURE — 3075F SYST BP GE 130 - 139MM HG: CPT | Performed by: INTERNAL MEDICINE

## 2022-06-15 PROCEDURE — 3079F DIAST BP 80-89 MM HG: CPT | Performed by: INTERNAL MEDICINE

## 2022-06-15 PROCEDURE — 3008F BODY MASS INDEX DOCD: CPT | Performed by: INTERNAL MEDICINE

## 2022-06-15 RX ORDER — AZITHROMYCIN 250 MG/1
TABLET, FILM COATED ORAL
Qty: 6 TABLET | Refills: 0 | Status: SHIPPED | OUTPATIENT
Start: 2022-06-15 | End: 2022-06-20

## 2022-06-15 RX ORDER — FLUTICASONE PROPIONATE AND SALMETEROL XINAFOATE 115; 21 UG/1; UG/1
1 AEROSOL, METERED RESPIRATORY (INHALATION) 2 TIMES DAILY
Qty: 1 EACH | Refills: 0 | Status: SHIPPED | OUTPATIENT
Start: 2022-06-15

## 2022-06-15 RX ORDER — BENZONATATE 200 MG/1
200 CAPSULE ORAL 3 TIMES DAILY PRN
Qty: 30 CAPSULE | Refills: 0 | Status: SHIPPED | OUTPATIENT
Start: 2022-06-15

## 2022-07-12 ENCOUNTER — OFFICE VISIT (OUTPATIENT)
Dept: SURGERY | Facility: CLINIC | Age: 62
End: 2022-07-12
Payer: COMMERCIAL

## 2022-07-12 VITALS
WEIGHT: 189.19 LBS | DIASTOLIC BLOOD PRESSURE: 73 MMHG | HEART RATE: 73 BPM | OXYGEN SATURATION: 99 % | BODY MASS INDEX: 31.52 KG/M2 | SYSTOLIC BLOOD PRESSURE: 123 MMHG | HEIGHT: 65 IN

## 2022-07-12 DIAGNOSIS — E66.9 OBESITY (BMI 30-39.9): ICD-10-CM

## 2022-07-12 DIAGNOSIS — E53.8 FOLIC ACID DEFICIENCY: ICD-10-CM

## 2022-07-12 DIAGNOSIS — Z51.81 ENCOUNTER FOR THERAPEUTIC DRUG MONITORING: ICD-10-CM

## 2022-07-12 DIAGNOSIS — R63.2 INCREASED APPETITE: ICD-10-CM

## 2022-07-12 DIAGNOSIS — E78.5 DYSLIPIDEMIA: Primary | ICD-10-CM

## 2022-07-12 DIAGNOSIS — R12 HEART BURN: ICD-10-CM

## 2022-07-12 PROCEDURE — 3078F DIAST BP <80 MM HG: CPT | Performed by: INTERNAL MEDICINE

## 2022-07-12 PROCEDURE — 3008F BODY MASS INDEX DOCD: CPT | Performed by: INTERNAL MEDICINE

## 2022-07-12 PROCEDURE — 99214 OFFICE O/P EST MOD 30 MIN: CPT | Performed by: INTERNAL MEDICINE

## 2022-07-12 PROCEDURE — 3074F SYST BP LT 130 MM HG: CPT | Performed by: INTERNAL MEDICINE

## 2022-07-25 RX ORDER — TOPIRAMATE 25 MG/1
TABLET ORAL
Qty: 30 TABLET | Refills: 0 | Status: SHIPPED | OUTPATIENT
Start: 2022-07-25

## 2022-08-10 RX ORDER — FLUTICASONE PROPIONATE AND SALMETEROL XINAFOATE 115; 21 UG/1; UG/1
1 AEROSOL, METERED RESPIRATORY (INHALATION) 2 TIMES DAILY
Qty: 12 G | Refills: 0 | Status: SHIPPED | OUTPATIENT
Start: 2022-08-10

## 2022-08-29 ENCOUNTER — OFFICE VISIT (OUTPATIENT)
Dept: INTERNAL MEDICINE CLINIC | Facility: CLINIC | Age: 62
End: 2022-08-29
Payer: COMMERCIAL

## 2022-08-29 VITALS
HEIGHT: 65 IN | SYSTOLIC BLOOD PRESSURE: 138 MMHG | HEART RATE: 85 BPM | OXYGEN SATURATION: 97 % | BODY MASS INDEX: 31.49 KG/M2 | DIASTOLIC BLOOD PRESSURE: 78 MMHG | WEIGHT: 189 LBS

## 2022-08-29 DIAGNOSIS — E78.00 PURE HYPERCHOLESTEROLEMIA: ICD-10-CM

## 2022-08-29 DIAGNOSIS — E53.8 FOLIC ACID DEFICIENCY: ICD-10-CM

## 2022-08-29 DIAGNOSIS — R25.1 TREMOR OF BOTH HANDS: Primary | ICD-10-CM

## 2022-08-29 DIAGNOSIS — E53.8 VITAMIN B12 DEFICIENCY: ICD-10-CM

## 2022-08-29 DIAGNOSIS — E03.8 HYPOTHYROIDISM DUE TO HASHIMOTO'S THYROIDITIS: ICD-10-CM

## 2022-08-29 DIAGNOSIS — E06.3 HYPOTHYROIDISM DUE TO HASHIMOTO'S THYROIDITIS: ICD-10-CM

## 2022-08-29 PROCEDURE — 99214 OFFICE O/P EST MOD 30 MIN: CPT | Performed by: INTERNAL MEDICINE

## 2022-08-29 PROCEDURE — 3075F SYST BP GE 130 - 139MM HG: CPT | Performed by: INTERNAL MEDICINE

## 2022-08-29 PROCEDURE — 3008F BODY MASS INDEX DOCD: CPT | Performed by: INTERNAL MEDICINE

## 2022-08-29 PROCEDURE — 3078F DIAST BP <80 MM HG: CPT | Performed by: INTERNAL MEDICINE

## 2022-08-30 RX ORDER — LEVOTHYROXINE SODIUM 0.12 MG/1
125 TABLET ORAL
Qty: 90 TABLET | Refills: 1 | Status: SHIPPED | OUTPATIENT
Start: 2022-08-30 | End: 2023-02-28

## 2022-08-30 NOTE — TELEPHONE ENCOUNTER
Refill passed per [facility] protocol.    Requested Prescriptions   Pending Prescriptions Disp Refills    LEVOTHYROXINE 125 MCG Oral Tab [Pharmacy Med Name: Levothyroxine Sodium 125 Mcg Tab Lupi] 90 tablet 0     Sig: TAKE ONE TABLET BY MOUTH ONE TIME DAILY 30-60 MINUTES BEFORE BREAKFAST        Thyroid Medication Protocol Passed - 8/30/2022  1:31 AM        Passed - TSH in past 12 months        Passed - Last TSH value is normal     Lab Results   Component Value Date    TSH 0.480 03/05/2022    [facility] 0.86 01/16/2016                 Passed - In person appointment or virtual visit in the past 12 mos or appointment in next 3 mos       Recent Outpatient Visits              Yesterday Tremor of both hands    Kali Shah MD    Office Visit    1 month ago Dyslipidemia    Era Felty, MD    Office Visit    2 months ago Cough    [facility], [facility], [place] Kylah Johnson MD    Office Visit    2 months ago     [facility] in [place], [place]    Office Visit    3 months ago     [facility] in [place], [place]    Office Visit     Future Appointments         Provider Department Appt Notes    In 1 month Peter Newby MD [address] Pat Garcia in both hands    In 2 months Morales Earl MD [address] [place] [address], [address], [place] Follow up on [employer] loss goals and details                   Recent Outpatient Visits              Yesterday Tremor of both Elenor Rook, [address] [facility] [place] Kylah Johnson MD    Office Visit    1 month ago Dyslipidemia    [address] [place] [address], Reid Nunez MD    Office Visit    2 months ago Luann Larkin, [place] Dennice Dukes, MD    Office Visit    2 months ago     [facility] [place] [place]    Office Visit    3 months ago     [address] in [place], [place]    Office Visit          Future Appointments         Provider Department Appt Notes    In 1 month MD Omer Yu Dr. in both hands    In 2 months Evelyn Varner MD [address], [address], [place] Follow up on weight loss goals and details

## 2022-09-26 RX ORDER — ROSUVASTATIN CALCIUM 5 MG/1
5 TABLET, COATED ORAL NIGHTLY
Qty: 90 TABLET | Refills: 1 | Status: SHIPPED | OUTPATIENT
Start: 2022-09-26 | End: 2023-03-23

## 2022-09-26 NOTE — TELEPHONE ENCOUNTER
Refill passed per [address] [place] protocol. Requested Prescriptions   Pending Prescriptions Disp Refills    ROSUVASTATIN 5 MG Oral Tab [Pharmacy Med Name: Rosuvastatin Calcium 5 Mg Tab Nort] 90 tablet 0     Sig: Take 1 tablet (5 mg total) by mouth nightly.         Cholesterol Medication Protocol Passed - 9/25/2022  7:48 PM        Passed - ALT in past 12 months        Passed - LDL in past 12 months        Passed - Last ALT < 80       Lab Results   Component Value Date    ALT 26 03/05/2022             Passed - Last LDL < 130     Lab Results   Component Value Date     (H) 03/05/2022               Passed - In person appointment or virtual visit in the past 12 mos or appointment in next 3 mos       Recent Outpatient Visits              4 weeks ago Tremor of both hands    Balwinder Kerr MD    Office Visit    2 months ago Dyslipidemia    Skye Fernandez MD    Office Visit    3 months ago Cough    [address], [address], [place] Jyoti Price MD    Office Visit    3 months ago     [facility] in [place], [place]    Office Visit    4 months ago     [facility] in [place], [place]    Office Visit     Future Appointments         Provider Department Appt Notes    In 1 week Bard Kenneth MD [address] Milner Members in both hands    In 1 month [facility], [address] [place] [address] Follow up on [employer] loss goals and details                     Recent Outpatient Visits              4 weeks ago Tremor of both Ailyn Nash MD    Office Visit    2 months ago Dyslipidemia    [address] [place] [address], Leroy Gutierrez MD    Office Visit    3 months ago Cough    [facility], [address] [place] [address] Magdaleno Gill MD    Office Visit    3 months ago     [address] in [place], [place], [place]    Office Visit    4 months ago     [address] in [place], [place], [place]    Office Visit            Future Appointments         Provider Department Appt Notes    In 1 week MD Marycarmen Hayden Dr. in both hands    In 1 month Gabriel Watts MD [address], [address], [place] Follow up on weight loss goals and details

## 2022-10-04 ENCOUNTER — OFFICE VISIT (OUTPATIENT)
Dept: NEUROLOGY | Facility: CLINIC | Age: 62
End: 2022-10-04
Payer: COMMERCIAL

## 2022-10-04 ENCOUNTER — LAB ENCOUNTER (OUTPATIENT)
Dept: LAB | Facility: HOSPITAL | Age: 62
End: 2022-10-04
Attending: Other
Payer: COMMERCIAL

## 2022-10-04 VITALS — DIASTOLIC BLOOD PRESSURE: 82 MMHG | HEART RATE: 79 BPM | SYSTOLIC BLOOD PRESSURE: 144 MMHG

## 2022-10-04 DIAGNOSIS — G20 PD (PARKINSON'S DISEASE) (HCC): ICD-10-CM

## 2022-10-04 DIAGNOSIS — R25.1 TREMOR OF BOTH HANDS: Primary | ICD-10-CM

## 2022-10-04 DIAGNOSIS — R25.1 TREMOR OF BOTH HANDS: ICD-10-CM

## 2022-10-04 PROBLEM — J44.9 CHRONIC OBSTRUCTIVE PULMONARY DISEASE, UNSPECIFIED (HCC): Status: ACTIVE | Noted: 2022-10-04

## 2022-10-04 LAB
T3FREE SERPL-MCNC: 2.94 PG/ML (ref 2.4–4.2)
T4 FREE SERPL-MCNC: 1.4 NG/DL (ref 0.8–1.7)
TSI SER-ACNC: 0.11 MIU/ML (ref 0.36–3.74)
VIT B12 SERPL-MCNC: 256 PG/ML (ref 193–986)

## 2022-10-04 PROCEDURE — 99204 OFFICE O/P NEW MOD 45 MIN: CPT | Performed by: OTHER

## 2022-10-04 PROCEDURE — 84481 FREE ASSAY (FT-3): CPT

## 2022-10-04 PROCEDURE — 83825 ASSAY OF MERCURY: CPT

## 2022-10-04 PROCEDURE — 3077F SYST BP >= 140 MM HG: CPT | Performed by: OTHER

## 2022-10-04 PROCEDURE — 86618 LYME DISEASE ANTIBODY: CPT

## 2022-10-04 PROCEDURE — 84443 ASSAY THYROID STIM HORMONE: CPT

## 2022-10-04 PROCEDURE — 86780 TREPONEMA PALLIDUM: CPT

## 2022-10-04 PROCEDURE — 3079F DIAST BP 80-89 MM HG: CPT | Performed by: OTHER

## 2022-10-04 PROCEDURE — 36415 COLL VENOUS BLD VENIPUNCTURE: CPT | Performed by: OTHER

## 2022-10-04 PROCEDURE — 84207 ASSAY OF VITAMIN B-6: CPT | Performed by: OTHER

## 2022-10-04 PROCEDURE — 84446 ASSAY OF VITAMIN E: CPT | Performed by: OTHER

## 2022-10-04 PROCEDURE — 82607 VITAMIN B-12: CPT | Performed by: OTHER

## 2022-10-04 PROCEDURE — 82390 ASSAY OF CERULOPLASMIN: CPT

## 2022-10-04 PROCEDURE — 84439 ASSAY OF FREE THYROXINE: CPT

## 2022-10-04 PROCEDURE — 83921 ORGANIC ACID SINGLE QUANT: CPT

## 2022-10-05 ENCOUNTER — TELEPHONE (OUTPATIENT)
Dept: NEUROLOGY | Facility: CLINIC | Age: 62
End: 2022-10-05

## 2022-10-05 LAB
B BURGDOR IGG+IGM SER QL: NEGATIVE
CERULOPLASMIN SERPL-MCNC: 25.4 MG/DL (ref 20–60)
T PALLIDUM AB SER QL: NEGATIVE

## 2022-10-05 NOTE — TELEPHONE ENCOUNTER
I spoke with the patient and informed her of the results. Patient verbalized understanding. Reviewed and electronically signed by:  [facility], [address] [place] [address], [facility]

## 2022-10-07 LAB — MERCURY, BLOOD: <2.5 UG/L

## 2022-10-08 LAB
ALPHA-TOCOPHEROL (VIT E) -MG/L: 8.8 MG/L
GAMMA-TOCOPHEROL (VIT E) -MG/L: 2.4 MG/L

## 2022-10-10 LAB — MMA: 0.37 UMOL/L

## 2022-10-11 ENCOUNTER — TELEPHONE (OUTPATIENT)
Dept: NEUROLOGY | Facility: CLINIC | Age: 62
End: 2022-10-11

## 2022-10-11 LAB — VITAMIN B6: 37.8 NMOL/L

## 2022-10-11 NOTE — TELEPHONE ENCOUNTER
----- Message from Mickie Gilmore MD sent at 10/11/2022 12:28 PM CDT -----  Please let the patient know that results of these particular lab tests so far were normal.    Thank you

## 2022-10-19 ENCOUNTER — TELEPHONE (OUTPATIENT)
Dept: NEUROLOGY | Facility: CLINIC | Age: 62
End: 2022-10-19

## 2022-10-19 NOTE — TELEPHONE ENCOUNTER
----- Message from Kelly Barajas MD sent at 10/19/2022  6:40 AM CDT -----  Please let patient know that MRI brain didn't show significant abnormalities.

## 2022-11-11 DIAGNOSIS — K21.9 GASTROESOPHAGEAL REFLUX DISEASE: ICD-10-CM

## 2022-11-11 RX ORDER — PANTOPRAZOLE SODIUM 40 MG/1
40 TABLET, DELAYED RELEASE ORAL NIGHTLY
Qty: 90 TABLET | Refills: 1 | Status: SHIPPED | OUTPATIENT
Start: 2022-11-11

## 2022-11-11 NOTE — TELEPHONE ENCOUNTER
Refill passed per [facility] protocol. Requested Prescriptions   Pending Prescriptions Disp Refills    PANTOPRAZOLE 40 MG Oral Tab EC [Pharmacy Med Name: Pantoprazole Sodium Ec 40 Mg Tab Auro] 90 tablet 0     Sig: Take 1 tablet (40 mg total) by mouth nightly.        Gastrointestional Medication Protocol Passed - 11/11/2022  7:47 AM        Passed - In person appointment or virtual visit in the past 12 mos or appointment in next 3 mos     Recent Outpatient Visits              1 month ago Tremor of both hands    [facility] Adria Davis MD    Office Visit    2 months ago Tremor of both hands    [facility], [place] Neel Joseph MD    Office Visit    4 months ago Dyslipidemia    Ariana Mercado MD    Office Visit    4 months ago [facility]    [facility], [address] [place] [address] [place] Arlean Finch, MD    Office Visit    5 months ago     [facility] in [place], [place]    Office Visit          Future Appointments         Provider Department Appt Notes    In 2 months Adria Davis MD [facility] 3 mos f/u    In 2 months Heidy Campbell MD [facility], [address] [place] [address],[address], [place] Follow up [employer] loss care                     Recent Outpatient Visits              1 month ago Tremor of both hands    [facility] Adria Davis MD    Office Visit    2 months ago Tremor of both Claudene Rosser, MD    Office Visit    4 months ago Dyslipidemia    Ariana Mercado MD    Office Visit    4 months ago [facility]    [facility], [address] [place] [address] [place] Neel Joseph MD    Office Visit    5 months ago     [facility] in [place], [place]    Office Visit            Future Appointments         Provider Department Appt Notes    In 2 months Yuan Hernandez MD MEDICAL [facility] 3 mos f/u    In 2 months Raúl Mock MD [address] , [address] [facility] Follow up weight loss care

## 2022-12-19 ENCOUNTER — HOSPITAL ENCOUNTER (EMERGENCY)
Facility: HOSPITAL | Age: 62
Discharge: HOME | End: 2022-12-19
Attending: EMERGENCY MEDICINE
Payer: COMMERCIAL

## 2022-12-19 ENCOUNTER — APPOINTMENT (OUTPATIENT)
Dept: GENERAL RADIOLOGY | Facility: HOSPITAL | Age: 62
End: 2022-12-19
Attending: EMERGENCY MEDICINE
Payer: COMMERCIAL

## 2022-12-19 VITALS
HEART RATE: 93 BPM | BODY MASS INDEX: 32.44 KG/M2 | HEIGHT: 64 IN | TEMPERATURE: 98 F | SYSTOLIC BLOOD PRESSURE: 146 MMHG | OXYGEN SATURATION: 96 % | WEIGHT: 190 LBS | DIASTOLIC BLOOD PRESSURE: 76 MMHG | RESPIRATION RATE: 20 BRPM

## 2022-12-19 DIAGNOSIS — J11.1 INFLUENZA: Primary | ICD-10-CM

## 2022-12-19 LAB
ANION GAP SERPL CALC-SCNC: 4 MMOL/L (ref 0–18)
BASOPHILS # BLD AUTO: 0.03 X10(3) UL (ref 0–0.2)
BASOPHILS NFR BLD AUTO: 0.3 %
BUN BLD-MCNC: 14 MG/DL (ref 7–18)
BUN/CREAT SERPL: 14.4 (ref 10–20)
CALCIUM BLD-MCNC: 9.6 MG/DL (ref 8.5–10.1)
CHLORIDE SERPL-SCNC: 103 MMOL/L (ref 98–112)
CO2 SERPL-SCNC: 32 MMOL/L (ref 21–32)
CREAT BLD-MCNC: 0.97 MG/DL
DEPRECATED RDW RBC AUTO: 42.5 FL (ref 35.1–46.3)
EOSINOPHIL # BLD AUTO: 0.11 X10(3) UL (ref 0–0.7)
EOSINOPHIL NFR BLD AUTO: 1 %
ERYTHROCYTE [DISTWIDTH] IN BLOOD BY AUTOMATED COUNT: 12.1 % (ref 11–15)
FLUAV + FLUBV RNA SPEC NAA+PROBE: NEGATIVE
FLUAV + FLUBV RNA SPEC NAA+PROBE: POSITIVE
GFR SERPLBLD BASED ON 1.73 SQ M-ARVRAT: 65 ML/MIN/1.73M2 (ref 60–?)
GLUCOSE BLD-MCNC: 148 MG/DL (ref 70–99)
HCT VFR BLD AUTO: 40.7 %
HGB BLD-MCNC: 13.1 G/DL
IMM GRANULOCYTES # BLD AUTO: 0.02 X10(3) UL (ref 0–1)
IMM GRANULOCYTES NFR BLD: 0.2 %
LYMPHOCYTES # BLD AUTO: 3.69 X10(3) UL (ref 1–4)
LYMPHOCYTES NFR BLD AUTO: 33.2 %
MCH RBC QN AUTO: 30.4 PG (ref 26–34)
MCHC RBC AUTO-ENTMCNC: 32.2 G/DL (ref 31–37)
MCV RBC AUTO: 94.4 FL
MONOCYTES # BLD AUTO: 0.6 X10(3) UL (ref 0.1–1)
MONOCYTES NFR BLD AUTO: 5.4 %
NEUTROPHILS # BLD AUTO: 6.67 X10 (3) UL (ref 1.5–7.7)
NEUTROPHILS # BLD AUTO: 6.67 X10(3) UL (ref 1.5–7.7)
NEUTROPHILS NFR BLD AUTO: 59.9 %
OSMOLALITY SERPL CALC.SUM OF ELEC: 291 MOSM/KG (ref 275–295)
PLATELET # BLD AUTO: 306 10(3)UL (ref 150–450)
POTASSIUM SERPL-SCNC: 3.7 MMOL/L (ref 3.5–5.1)
RBC # BLD AUTO: 4.31 X10(6)UL
RSV RNA SPEC NAA+PROBE: NEGATIVE
SARS-COV-2 RNA RESP QL NAA+PROBE: NOT DETECTED
SODIUM SERPL-SCNC: 139 MMOL/L (ref 136–145)
WBC # BLD AUTO: 11.1 X10(3) UL (ref 4–11)

## 2022-12-19 PROCEDURE — 71045 X-RAY EXAM CHEST 1 VIEW: CPT | Performed by: EMERGENCY MEDICINE

## 2022-12-19 PROCEDURE — 80048 BASIC METABOLIC PNL TOTAL CA: CPT | Performed by: EMERGENCY MEDICINE

## 2022-12-19 PROCEDURE — 36415 COLL VENOUS BLD VENIPUNCTURE: CPT

## 2022-12-19 PROCEDURE — 85025 COMPLETE CBC W/AUTO DIFF WBC: CPT | Performed by: EMERGENCY MEDICINE

## 2022-12-19 PROCEDURE — 99284 EMERGENCY DEPT VISIT MOD MDM: CPT

## 2022-12-19 PROCEDURE — 94640 AIRWAY INHALATION TREATMENT: CPT

## 2022-12-19 PROCEDURE — 0241U SARS-COV-2/FLU A AND B/RSV BY PCR (GENEXPERT): CPT | Performed by: EMERGENCY MEDICINE

## 2022-12-19 PROCEDURE — 99283 EMERGENCY DEPT VISIT LOW MDM: CPT

## 2022-12-19 RX ORDER — ALBUTEROL SULFATE 90 UG/1
4 AEROSOL, METERED RESPIRATORY (INHALATION) ONCE
Status: COMPLETED | OUTPATIENT
Start: 2022-12-19 | End: 2022-12-19

## 2022-12-19 RX ORDER — BENZONATATE 100 MG/1
100 CAPSULE ORAL 3 TIMES DAILY PRN
Qty: 30 CAPSULE | Refills: 0 | Status: SHIPPED | OUTPATIENT
Start: 2022-12-19 | End: 2023-01-18

## 2022-12-20 NOTE — ED QUICK NOTES
Patient approved for discharge by ED provider. Prescriptions for benzonate given and reviewed. Instructed to follow up with PCP. Given information on when to return to ED. All questions addressed and reviewed. Verbalizes understanding. Left ED in stable condition.

## 2022-12-28 ENCOUNTER — HOSPITAL ENCOUNTER (OUTPATIENT)
Dept: GENERAL RADIOLOGY | Age: 62
Discharge: HOME | End: 2022-12-28
Attending: PODIATRIST
Payer: COMMERCIAL

## 2022-12-28 ENCOUNTER — OFFICE VISIT (OUTPATIENT)
Dept: PODIATRY CLINIC | Facility: CLINIC | Age: 62
End: 2022-12-28
Payer: COMMERCIAL

## 2022-12-28 DIAGNOSIS — M79.672 LEFT FOOT PAIN: ICD-10-CM

## 2022-12-28 DIAGNOSIS — M20.12 HAV (HALLUX ABDUCTO VALGUS), LEFT: ICD-10-CM

## 2022-12-28 DIAGNOSIS — M20.12 HAV (HALLUX ABDUCTO VALGUS), LEFT: Primary | ICD-10-CM

## 2022-12-28 PROCEDURE — 99203 OFFICE O/P NEW LOW 30 MIN: CPT | Performed by: PODIATRIST

## 2022-12-28 PROCEDURE — 73630 X-RAY EXAM OF FOOT: CPT | Performed by: PODIATRIST

## 2023-01-03 ENCOUNTER — HOSPITAL ENCOUNTER (OUTPATIENT)
Dept: GENERAL RADIOLOGY | Age: 63
Discharge: HOME | End: 2023-01-03
Attending: INTERNAL MEDICINE
Payer: COMMERCIAL

## 2023-01-03 ENCOUNTER — OFFICE VISIT (OUTPATIENT)
Dept: INTERNAL MEDICINE CLINIC | Facility: CLINIC | Age: 63
End: 2023-01-03
Payer: COMMERCIAL

## 2023-01-03 VITALS
BODY MASS INDEX: 31.92 KG/M2 | HEART RATE: 71 BPM | DIASTOLIC BLOOD PRESSURE: 65 MMHG | SYSTOLIC BLOOD PRESSURE: 113 MMHG | WEIGHT: 187 LBS | HEIGHT: 64 IN

## 2023-01-03 DIAGNOSIS — G89.29 CHRONIC BILATERAL LOW BACK PAIN, UNSPECIFIED WHETHER SCIATICA PRESENT: ICD-10-CM

## 2023-01-03 DIAGNOSIS — E06.3 HYPOTHYROIDISM DUE TO HASHIMOTO'S THYROIDITIS: ICD-10-CM

## 2023-01-03 DIAGNOSIS — M54.50 CHRONIC BILATERAL LOW BACK PAIN, UNSPECIFIED WHETHER SCIATICA PRESENT: ICD-10-CM

## 2023-01-03 DIAGNOSIS — E03.8 HYPOTHYROIDISM DUE TO HASHIMOTO'S THYROIDITIS: ICD-10-CM

## 2023-01-03 DIAGNOSIS — R73.9 ELEVATED BLOOD SUGAR: ICD-10-CM

## 2023-01-03 DIAGNOSIS — E78.00 PURE HYPERCHOLESTEROLEMIA: Primary | ICD-10-CM

## 2023-01-03 DIAGNOSIS — M62.89 MUSCLE STIFFNESS: ICD-10-CM

## 2023-01-03 PROCEDURE — 3078F DIAST BP <80 MM HG: CPT | Performed by: INTERNAL MEDICINE

## 2023-01-03 PROCEDURE — 99214 OFFICE O/P EST MOD 30 MIN: CPT | Performed by: INTERNAL MEDICINE

## 2023-01-03 PROCEDURE — 3074F SYST BP LT 130 MM HG: CPT | Performed by: INTERNAL MEDICINE

## 2023-01-03 PROCEDURE — 72100 X-RAY EXAM L-S SPINE 2/3 VWS: CPT | Performed by: INTERNAL MEDICINE

## 2023-01-03 PROCEDURE — 3008F BODY MASS INDEX DOCD: CPT | Performed by: INTERNAL MEDICINE

## 2023-01-03 RX ORDER — FLUTICASONE PROPIONATE AND SALMETEROL XINAFOATE 115; 21 UG/1; UG/1
1 AEROSOL, METERED RESPIRATORY (INHALATION) 2 TIMES DAILY
Qty: 12 G | Refills: 1 | Status: SHIPPED | OUTPATIENT
Start: 2023-01-03

## 2023-01-11 ENCOUNTER — OFFICE VISIT (OUTPATIENT)
Dept: NEUROLOGY | Facility: CLINIC | Age: 63
End: 2023-01-11
Payer: COMMERCIAL

## 2023-01-11 VITALS — HEART RATE: 82 BPM | DIASTOLIC BLOOD PRESSURE: 84 MMHG | SYSTOLIC BLOOD PRESSURE: 126 MMHG

## 2023-01-11 DIAGNOSIS — G20 PD (PARKINSON'S DISEASE) (HCC): ICD-10-CM

## 2023-01-11 DIAGNOSIS — R25.1 TREMOR OF BOTH HANDS: Primary | ICD-10-CM

## 2023-01-11 RX ORDER — SELEGILINE HYDROCHLORIDE 5 MG/1
5 TABLET ORAL 2 TIMES DAILY WITH MEALS
Qty: 180 TABLET | Refills: 3 | Status: SHIPPED | OUTPATIENT
Start: 2023-01-11

## 2023-01-16 ENCOUNTER — PATIENT MESSAGE (OUTPATIENT)
Dept: NEUROLOGY | Facility: CLINIC | Age: 63
End: 2023-01-16

## 2023-01-16 DIAGNOSIS — G20 PARKINSON'S DISEASE (HCC): Primary | ICD-10-CM

## 2023-01-16 NOTE — TELEPHONE ENCOUNTER
From: Vianca Mejia  To: Mechelle Hayward MD  Sent: 1/16/2023 7:27 AM [facility]  Subject: Medication side effect? I started taking the Selegiline as ordered on Saturday 1/14 (my pharmacy took a few days to get it in so I was delayed in starting). I am having bad headache, dizziness and nausea-all normal side effects according to the literature but also elevated blood pressure (my norm before starting 120/76----now it is consistently 148/94 when waking up and goes to 158/98 & 168/102 after med doses). Should I be concerned? ? Do you think my body will adjust after a while of taking it and blood pressures will return to my norms??? The medication cost me $300 for the 3 month amount so I definitely want to continue taking it BUT I am concerned about thr bump up in blood pressure & accompanying headache. Please advise. Thank you.  Israel Sparks

## 2023-01-16 NOTE — TELEPHONE ENCOUNTER
Called & spoke to patient. She started taking selegiline 5mg po BID on Saturday. She began to experience a frontal headache 7-8/10 & noticed an sudden increase in her blood pressure. She took tylenol for her headache with some relief. Denies blurry vision. She only took two doses of the medication & stopped. Headache is still present. She is monitoring her blood pressure. She is looking for advisement on how to proceed.      Pt raised concern over spending $300 on a medication that she may not be able to take

## 2023-01-18 ENCOUNTER — OFFICE VISIT (OUTPATIENT)
Dept: PHYSICAL MEDICINE AND REHAB | Facility: CLINIC | Age: 63
End: 2023-01-18
Payer: COMMERCIAL

## 2023-01-18 VITALS — HEART RATE: 90 BPM | BODY MASS INDEX: 32.44 KG/M2 | WEIGHT: 190 LBS | HEIGHT: 64 IN | OXYGEN SATURATION: 97 %

## 2023-01-18 DIAGNOSIS — M54.16 BILATERAL LUMBAR RADICULOPATHY: Primary | ICD-10-CM

## 2023-01-18 DIAGNOSIS — M47.816 FACET ARTHROPATHY, LUMBAR: ICD-10-CM

## 2023-01-18 PROBLEM — G20.A1 PARKINSON'S DISEASE (HCC): Status: ACTIVE | Noted: 2023-01-18

## 2023-01-18 PROBLEM — G20.A1 PARKINSON'S DISEASE: Status: ACTIVE | Noted: 2023-01-18

## 2023-01-18 PROBLEM — G20 PARKINSON'S DISEASE (HCC): Status: ACTIVE | Noted: 2023-01-18

## 2023-01-18 PROCEDURE — 99244 OFF/OP CNSLTJ NEW/EST MOD 40: CPT | Performed by: PHYSICAL MEDICINE & REHABILITATION

## 2023-01-18 PROCEDURE — 3008F BODY MASS INDEX DOCD: CPT | Performed by: PHYSICAL MEDICINE & REHABILITATION

## 2023-01-18 RX ORDER — MELOXICAM 15 MG/1
15 TABLET ORAL DAILY
Qty: 14 TABLET | Refills: 0 | Status: SHIPPED | OUTPATIENT
Start: 2023-01-18 | End: 2023-02-01

## 2023-01-24 RX ORDER — AMANTADINE HYDROCHLORIDE 100 MG/1
100 CAPSULE, GELATIN COATED ORAL 2 TIMES DAILY
Qty: 180 CAPSULE | Refills: 1 | Status: SHIPPED | OUTPATIENT
Start: 2023-01-24

## 2023-02-13 ENCOUNTER — MED REC SCAN ONLY (OUTPATIENT)
Dept: PHYSICAL MEDICINE AND REHAB | Facility: CLINIC | Age: 63
End: 2023-02-13

## 2023-02-13 ENCOUNTER — OFFICE VISIT (OUTPATIENT)
Dept: PHYSICAL MEDICINE AND REHAB | Facility: CLINIC | Age: 63
End: 2023-02-13
Payer: COMMERCIAL

## 2023-02-13 VITALS — HEIGHT: 64 IN | BODY MASS INDEX: 32.44 KG/M2 | WEIGHT: 190 LBS

## 2023-02-13 DIAGNOSIS — M54.16 BILATERAL LUMBAR RADICULOPATHY: Primary | ICD-10-CM

## 2023-02-13 DIAGNOSIS — M47.816 FACET ARTHROPATHY, LUMBAR: ICD-10-CM

## 2023-02-13 PROCEDURE — 3008F BODY MASS INDEX DOCD: CPT | Performed by: PHYSICAL MEDICINE & REHABILITATION

## 2023-02-13 PROCEDURE — 99213 OFFICE O/P EST LOW 20 MIN: CPT | Performed by: PHYSICAL MEDICINE & REHABILITATION

## 2023-02-17 ENCOUNTER — PATIENT MESSAGE (OUTPATIENT)
Dept: NEUROLOGY | Facility: CLINIC | Age: 63
End: 2023-02-17

## 2023-02-17 NOTE — TELEPHONE ENCOUNTER
If we are concerned that amantadine is the culprit, then we need to taper it off, start taking it once a day for 1 week and then stop. However could also be relating to hypertension itself.     Lets make sure that once you are off of the amantadine, your blood pressure remains well off of this couple of weeks before we start anything new

## 2023-02-17 NOTE — TELEPHONE ENCOUNTER
From: Milas Gitelman  To: Juan Bass MD  Sent: 2/17/2023 8:14 AM [facility]  Subject: New Medication side effect? ? After I had blood pressure problems with starting Selegiline, the pharmacist suggested I gradually begin slowly when first starting the Amantadine. The first week I took the Amantadine I took 1 dose in the AM. Slight headache (normal side effect) but tolerated fine. This week (started Sunday) I started taking the Amantadine 2X/day. Headache remains but is gradually getting worse. I started checking my blood pressure and it is elevating again. Yesterday evening 169/102. This morning upon wakening my bp was 158/94. When I discontinued Selegiline, my blood pressure returned to my norm 128/80 so I don't feel I have a blood pressure issue at present but possibly a reaction to the medication. I am confused by my body's reaction to Amantadine as it is a different class of medication than Selegiline. I just took my blood pressure at work (with a different machine ) and it is 168/110. I know I can't abruptly stop Amantadine but I feel this blood pressure elevation may be linked to it.  Help   please!!

## 2023-02-17 NOTE — TELEPHONE ENCOUNTER
Please advise. Reviewed and electronically signed by:  [facility], [address] [place] [address], [facility]

## 2023-02-28 RX ORDER — LEVOTHYROXINE SODIUM 0.12 MG/1
125 TABLET ORAL
Qty: 90 TABLET | Refills: 0 | Status: SHIPPED | OUTPATIENT
Start: 2023-02-28

## 2023-02-28 NOTE — TELEPHONE ENCOUNTER
Please review. Protocol failed/ No protocol.     Requested Prescriptions   Pending Prescriptions Disp Refills    LEVOTHYROXINE 125 MCG Oral Tab [Pharmacy Med Name: Levothyroxine Sodium 125 Mcg Tab Lupi] 90 tablet 0     Sig: Take 1 tablet (125 mcg total) by mouth before breakfast.       Thyroid Medication Protocol Failed - 2/24/2023  1:30 AM        Failed - Last TSH value is normal     Lab Results   Component Value Date    TSH 0.108 (L) 10/04/2022    THYROIDFUNC 0.86 01/16/2016                 Passed - TSH in past 12 months        Passed - In person appointment or virtual visit in the past 12 mos or appointment in next 3 mos     Recent Outpatient Visits              2 weeks ago Bilateral lumbar radiculopathy    [facility], [address] [facility], [place], [place]    Office Visit    1 month ago Bilateral lumbar radiculopathy    [facility], [address], [facility]    Office Visit    1 month ago Tremor of both hands    [address], [address], Meredith Eddy MD    Office Visit    1 month ago Pure hypercholesterolemia    [address], [place] Catherine Paez MD    Office Visit    2 months ago Hav (hallux abducto valgus), left    [facility], [address], [place], Sinda Breath, [place]    Office Visit          Future Appointments         Provider Department Appt Notes    In 2 weeks Arabella Velez DO [address], [facility] follow up for back pain    In 1 month Pratima Stern MD [address], [address], [place]                     Recent Outpatient Visits              2 weeks ago Bilateral lumbar radiculopathy    [address], [address], [place], [place]    Office Visit    1 month ago Bilateral lumbar radiculopathy    [address], [address] [facility], [place], [place]    Office Visit    1 month ago Tremor of both hands    [facility], [address], Clark Bruno MD    Office Visit    1 month ago Pure hypercholesterolemia    [facility], [address], [place] Marycarmen Reid MD    Office Visit    2 months ago Hav (hallux abducto valgus), left    [facility], [address], [place], Zena Earnest, [place]    Office Visit            Future Appointments         Provider Department Appt Notes    In 2 weeks [place], DO [facility], [facility] follow up for back pain    In 1 month [facility], MD [facility], [address], [place]

## 2023-03-15 ENCOUNTER — OFFICE VISIT (OUTPATIENT)
Dept: PHYSICAL MEDICINE AND REHAB | Facility: CLINIC | Age: 63
End: 2023-03-15
Payer: COMMERCIAL

## 2023-03-15 ENCOUNTER — TELEPHONE (OUTPATIENT)
Dept: PHYSICAL MEDICINE AND REHAB | Facility: CLINIC | Age: 63
End: 2023-03-15

## 2023-03-15 VITALS
HEART RATE: 74 BPM | OXYGEN SATURATION: 95 % | DIASTOLIC BLOOD PRESSURE: 60 MMHG | BODY MASS INDEX: 32.44 KG/M2 | SYSTOLIC BLOOD PRESSURE: 120 MMHG | WEIGHT: 190 LBS | HEIGHT: 64 IN

## 2023-03-15 DIAGNOSIS — M67.951 TENDINOPATHY OF RIGHT GLUTEUS MEDIUS: ICD-10-CM

## 2023-03-15 DIAGNOSIS — M53.3 SACROILIAC JOINT DYSFUNCTION OF RIGHT SIDE: Primary | ICD-10-CM

## 2023-03-15 RX ORDER — LIDOCAINE HYDROCHLORIDE 10 MG/ML
4 INJECTION, SOLUTION INFILTRATION; PERINEURAL ONCE
Status: COMPLETED | OUTPATIENT
Start: 2023-03-15 | End: 2023-03-15

## 2023-03-15 RX ORDER — TRIAMCINOLONE ACETONIDE 40 MG/ML
40 INJECTION, SUSPENSION INTRA-ARTICULAR; INTRAMUSCULAR ONCE
Status: COMPLETED | OUTPATIENT
Start: 2023-03-15 | End: 2023-03-15

## 2023-03-15 RX ADMIN — TRIAMCINOLONE ACETONIDE 40 MG: 40 INJECTION, SUSPENSION INTRA-ARTICULAR; INTRAMUSCULAR at 16:30:00

## 2023-03-15 RX ADMIN — LIDOCAINE HYDROCHLORIDE 4 ML: 10 INJECTION, SOLUTION INFILTRATION; PERINEURAL at 16:30:00

## 2023-03-15 NOTE — PATIENT INSTRUCTIONS
Steroid Injection Information  What to expect: The injection contains Lidocaine (which numbs the area) and Kenalog (a steroid which decreases inflammation). You may have pain relief within hours of the injection due to the Lidocaine. The Kenalog can take a couple days, up to a couple weeks, to reach the full effect. It is also possible to have a slight increase in symptoms over the first few days, but that should resolve fairly quickly. How long will the injection last?: The length of response to an injection is variable. Literally a couple weeks to a couple years. The injection will decrease the inflammation and the pain will return if/when the inflammation returns. Activity Recommendations: For the first 24 hours after injection, keep the area clean and dry. It is ok to shower but don't soak in a tub during that time. No vigorous activity such as running or heavy lifting for the first week but other than that you can gradually resume your normal activities immediately. If you have a significant decrease in pain, be careful not to do too much too soon. Again, the key is GRADUAL resumption of activites. Things to look out for: Common injection side effects include soreness at the injection site, bruising, flushing of the face or skin, and a temporary increase in your blood sugars and/or blood pressure. Infection is very rare but please notify my office [facility] for [address] [place] [phone]) if you develop any fevers, drainage from the injection site, or severe increase in pain. If it is the weekend, go to an Emergency Room.       4 weeks video

## 2023-03-15 NOTE — TELEPHONE ENCOUNTER
Initiated authorization for Ultrasound guided right SI joint injection with corticosteroid CPT 10950, , [address] with [employer]  Transaction ID: [id number]  Status: Approved-authorization is not required per health plan    Procedure done in office

## 2023-03-16 ENCOUNTER — MED REC SCAN ONLY (OUTPATIENT)
Dept: PHYSICAL MEDICINE AND REHAB | Facility: CLINIC | Age: 63
End: 2023-03-16

## 2023-03-20 NOTE — PROCEDURES
Procedure:  Ultrasound guided RIGHT sacroiliac joint injection  The risks, benefits and anticipated outcomes of the procedure, the risks and benefits of the alternatives to the procedure, and the roles and tasks of the personnel to be involved, were discussed with the patient, and the patient consents to the procedure and agrees to proceed. UNIVERSAL PROTOCOL / SAFETY CHECKLIST  Sign in Communication: Completed  Time Out:  Team Confirms the Correct Patient, Correct Procedure, Correct Site and Site Marking, Correct Position (if applicable), Prep and Dry Time (if applicable). Affirmation of Time Out: YES  Sign Out Discussion: Completed if applicable  The procedure was carried out under sterile prep. A 27 ga 1&1/4in needle was introduced and advanced with ultrasound guidance for skin anesthesia with 3 cc of 1% lidocaine. Then, again with real time ultrasound guidance, a  22 gauge 3.5in needle was advanced deep to the gluteus anish, lumbar multifidus and erector spinae muscle to this ilium just inferior and medial to the PSIS. Following negative aspiration, a mixture of 4 cc of 1% lidocaine and 1 cc of Kenalog (40mg/ml) was injected. The needle was withdrawn without any complications. Permanent pictures were taken and stored in the PACS system. Ultrasound interpretation was performed prior to the procedure to identify the target and any adjacent neurovascular structures. Subsequently, interpretation was performed during real-time needle guidance confirming placement. Post-intervention interpretation was also performed confirming appropriate injectate flow and hemostasis. The patient tolerated the procedure without complication and was instructed in post-procedure precautions. See patient instructions.     Clarice Gamboa DO, FAAPMR & CAQSM  Physical Medicine and Rehabilitation/Sports Medicine  MEDICAL [facility]

## 2023-03-23 RX ORDER — ROSUVASTATIN CALCIUM 5 MG/1
5 TABLET, COATED ORAL NIGHTLY
Qty: 90 TABLET | Refills: 0 | Status: SHIPPED | OUTPATIENT
Start: 2023-03-23

## 2023-03-23 NOTE — TELEPHONE ENCOUNTER
Please review. Protocol failed / No protocol. Requested Prescriptions   Pending Prescriptions Disp Refills    ROSUVASTATIN 5 MG Oral Tab [Pharmacy Med Name: Rosuvastatin Calcium 5 Mg Tab Nort] 90 tablet 0     Sig: Take 1 tablet (5 mg total) by mouth nightly.        Cholesterol Medication Protocol Failed - 3/22/2023  1:30 AM        Failed - ALT in past 12 months        Failed - LDL in past 12 months        Failed - Last ALT < 80     Lab Results   Component Value Date    ALT 26 03/05/2022             Failed - Last LDL < 130     Lab Results   Component Value Date     (H) 03/05/2022             Passed - In person appointment or virtual visit in the past 12 mos or appointment in next 3 mos     Recent Outpatient Visits              1 week ago Sacroiliac joint dysfunction of right side    [facility], [address], [place], [place]    Office Visit    1 month ago Bilateral lumbar radiculopathy    [facility], [address], [place], [place]    Office Visit    2 months ago Bilateral lumbar radiculopathy    [facility], [address], [facility],     Office Visit    2 months ago Tremor of both hands    [facility], [address], Francisca Gómez MD    Office Visit    2 months ago Pure hypercholesterolemia    [facility], [address], [place] Hodan Chang MD    Office Visit          Future Appointments         Provider Department Appt Notes    In 3 weeks MD Ronaldo Madden, [address], [place]                     Recent Outpatient Visits              1 week ago Sacroiliac joint dysfunction of right side    [facility], [address], [place], [place]    Office Visit    1 month ago Bilateral lumbar radiculopathy    [facility], [address], [place], [place]    Office Visit    2 months ago Bilateral lumbar radiculopathy    [facility], [address], [place], [place]    Office Visit    2 months ago Tremor of both hands    [address] [facility], Yao Bey MD    Office Visit    2 months ago Pure hypercholesterolemia    [address], [facility], Clara Jones MD    Office Visit            Future Appointments         Provider Department Appt Notes    In 3 weeks Ronda Flores MD [address], [address], [facility]

## 2023-04-29 ENCOUNTER — LAB ENCOUNTER (OUTPATIENT)
Dept: LAB | Facility: REFERENCE LAB | Age: 63
End: 2023-04-29
Attending: INTERNAL MEDICINE
Payer: COMMERCIAL

## 2023-04-29 DIAGNOSIS — M62.89 MUSCLE STIFFNESS: ICD-10-CM

## 2023-04-29 DIAGNOSIS — E78.00 PURE HYPERCHOLESTEROLEMIA: ICD-10-CM

## 2023-04-29 DIAGNOSIS — R73.9 ELEVATED BLOOD SUGAR: ICD-10-CM

## 2023-04-29 LAB
ALBUMIN SERPL-MCNC: 3.8 G/DL (ref 3.4–5)
ALBUMIN/GLOB SERPL: 1 {RATIO} (ref 1–2)
ALP LIVER SERPL-CCNC: 45 U/L
ALT SERPL-CCNC: 22 U/L
ANION GAP SERPL CALC-SCNC: 7 MMOL/L (ref 0–18)
AST SERPL-CCNC: 17 U/L (ref 15–37)
BASOPHILS # BLD AUTO: 0.04 X10(3) UL (ref 0–0.2)
BASOPHILS NFR BLD AUTO: 0.7 %
BILIRUB SERPL-MCNC: 1 MG/DL (ref 0.1–2)
BUN BLD-MCNC: 15 MG/DL (ref 7–18)
BUN/CREAT SERPL: 16.3 (ref 10–20)
CALCIUM BLD-MCNC: 9.8 MG/DL (ref 8.5–10.1)
CHLORIDE SERPL-SCNC: 105 MMOL/L (ref 98–112)
CHOLEST SERPL-MCNC: 190 MG/DL (ref ?–200)
CK SERPL-CCNC: 92 U/L
CO2 SERPL-SCNC: 29 MMOL/L (ref 21–32)
CREAT BLD-MCNC: 0.92 MG/DL
DEPRECATED RDW RBC AUTO: 41.4 FL (ref 35.1–46.3)
EOSINOPHIL # BLD AUTO: 0.1 X10(3) UL (ref 0–0.7)
EOSINOPHIL NFR BLD AUTO: 1.7 %
ERYTHROCYTE [DISTWIDTH] IN BLOOD BY AUTOMATED COUNT: 11.9 % (ref 11–15)
EST. AVERAGE GLUCOSE BLD GHB EST-MCNC: 131 MG/DL (ref 68–126)
FASTING PATIENT LIPID ANSWER: YES
FASTING STATUS PATIENT QL REPORTED: YES
GFR SERPLBLD BASED ON 1.73 SQ M-ARVRAT: 70 ML/MIN/1.73M2 (ref 60–?)
GLOBULIN PLAS-MCNC: 3.8 G/DL (ref 2.8–4.4)
GLUCOSE BLD-MCNC: 119 MG/DL (ref 70–99)
HBA1C MFR BLD: 6.2 % (ref ?–5.7)
HCT VFR BLD AUTO: 40.6 %
HDLC SERPL-MCNC: 58 MG/DL (ref 40–59)
HGB BLD-MCNC: 13.2 G/DL
IMM GRANULOCYTES # BLD AUTO: 0.01 X10(3) UL (ref 0–1)
IMM GRANULOCYTES NFR BLD: 0.2 %
LDLC SERPL CALC-MCNC: 113 MG/DL (ref ?–100)
LYMPHOCYTES # BLD AUTO: 2.5 X10(3) UL (ref 1–4)
LYMPHOCYTES NFR BLD AUTO: 42.2 %
MCH RBC QN AUTO: 30.5 PG (ref 26–34)
MCHC RBC AUTO-ENTMCNC: 32.5 G/DL (ref 31–37)
MCV RBC AUTO: 93.8 FL
MONOCYTES # BLD AUTO: 0.43 X10(3) UL (ref 0.1–1)
MONOCYTES NFR BLD AUTO: 7.3 %
NEUTROPHILS # BLD AUTO: 2.84 X10 (3) UL (ref 1.5–7.7)
NEUTROPHILS # BLD AUTO: 2.84 X10(3) UL (ref 1.5–7.7)
NEUTROPHILS NFR BLD AUTO: 47.9 %
NONHDLC SERPL-MCNC: 132 MG/DL (ref ?–130)
OSMOLALITY SERPL CALC.SUM OF ELEC: 294 MOSM/KG (ref 275–295)
PLATELET # BLD AUTO: 293 10(3)UL (ref 150–450)
POTASSIUM SERPL-SCNC: 4.4 MMOL/L (ref 3.5–5.1)
PROT SERPL-MCNC: 7.6 G/DL (ref 6.4–8.2)
RBC # BLD AUTO: 4.33 X10(6)UL
SODIUM SERPL-SCNC: 141 MMOL/L (ref 136–145)
TRIGL SERPL-MCNC: 107 MG/DL (ref 30–149)
TSI SER-ACNC: 2.62 MIU/ML (ref 0.36–3.74)
VLDLC SERPL CALC-MCNC: 18 MG/DL (ref 0–30)
WBC # BLD AUTO: 5.9 X10(3) UL (ref 4–11)

## 2023-04-29 PROCEDURE — 85025 COMPLETE CBC W/AUTO DIFF WBC: CPT

## 2023-04-29 PROCEDURE — 83036 HEMOGLOBIN GLYCOSYLATED A1C: CPT

## 2023-04-29 PROCEDURE — 80053 COMPREHEN METABOLIC PANEL: CPT

## 2023-04-29 PROCEDURE — 36415 COLL VENOUS BLD VENIPUNCTURE: CPT

## 2023-04-29 PROCEDURE — 82550 ASSAY OF CK (CPK): CPT

## 2023-04-29 PROCEDURE — 80061 LIPID PANEL: CPT

## 2023-04-29 PROCEDURE — 84443 ASSAY THYROID STIM HORMONE: CPT

## 2023-05-09 ENCOUNTER — OFFICE VISIT (OUTPATIENT)
Dept: NEUROLOGY | Facility: CLINIC | Age: 63
End: 2023-05-09
Payer: COMMERCIAL

## 2023-05-09 VITALS
HEART RATE: 70 BPM | DIASTOLIC BLOOD PRESSURE: 80 MMHG | HEIGHT: 64 IN | BODY MASS INDEX: 32.44 KG/M2 | SYSTOLIC BLOOD PRESSURE: 148 MMHG | WEIGHT: 190 LBS

## 2023-05-09 DIAGNOSIS — G20 PARKINSON'S DISEASE (HCC): Primary | ICD-10-CM

## 2023-05-09 PROCEDURE — 3077F SYST BP >= 140 MM HG: CPT | Performed by: OTHER

## 2023-05-09 PROCEDURE — 3079F DIAST BP 80-89 MM HG: CPT | Performed by: OTHER

## 2023-05-09 PROCEDURE — 3008F BODY MASS INDEX DOCD: CPT | Performed by: OTHER

## 2023-05-09 PROCEDURE — 99213 OFFICE O/P EST LOW 20 MIN: CPT | Performed by: OTHER

## 2023-05-09 RX ORDER — AMANTADINE HYDROCHLORIDE 100 MG/1
100 CAPSULE, GELATIN COATED ORAL 2 TIMES DAILY
Qty: 180 CAPSULE | Refills: 1 | Status: SHIPPED | OUTPATIENT
Start: 2023-05-09

## 2023-05-10 DIAGNOSIS — K21.9 GASTROESOPHAGEAL REFLUX DISEASE: ICD-10-CM

## 2023-05-10 RX ORDER — PANTOPRAZOLE SODIUM 40 MG/1
40 TABLET, DELAYED RELEASE ORAL NIGHTLY
Qty: 90 TABLET | Refills: 3 | Status: SHIPPED | OUTPATIENT
Start: 2023-05-10

## 2023-05-10 NOTE — TELEPHONE ENCOUNTER
Refill passed per [facility], [facility] protocol. Requested Prescriptions   Pending Prescriptions Disp Refills    PANTOPRAZOLE 40 MG Oral Tab EC [Pharmacy Med Name: Pantoprazole Sodium Ec 40 Mg Tab Auro] 90 tablet 0     Sig: Take 1 tablet (40 mg total) by mouth nightly.        Gastrointestional Medication Protocol Passed - 5/10/2023  1:30 AM        Passed - In person appointment or virtual visit in the past 12 mos or appointment in next 3 mos     Recent Outpatient Visits              Yesterday Parkinson's disease [facility])    [address] [facility], Stanley South MD    Office Visit    1 month ago Sacroiliac joint dysfunction of right side    Alverta Itz, [address], [place], Alla Osmany, DO    Office Visit    2 months ago Bilateral lumbar radiculopathy    Christopher Mcdonoughon, [address], [place], [place]sm[place], [facility]    Office Visit    3 months ago Bilateral lumbar radiculopathy    Christopher Mcdonoughon, [address], [place], Alla Osmany, DO    Office Visit    3 months ago Tremor of both hands    Alvemadison Mobley, [address], Stanley South MD    Office Visit          Future Appointments         Provider Department Appt Notes    In 2 months MD Christopher Willson, [address], [address] 3 month                     Recent Outpatient Visits              Yesterday Parkinson's disease [facility])    Christopher Mobley, [address], Stanley South MD    Office Visit    1 month ago Sacroiliac joint dysfunction of right side    Alverta Itz, [address], [place], [facility] [place]sm[place], [facility]    Office Visit    2 months ago Bilateral lumbar radiculopathy    Alverta Itz, [address], [place], [facility] Osmany, [facility]    Office Visit    3 months ago Bilateral lumbar radiculopathy    Alverta Itz, [address], [place], Alla Osmany, [facility] Office Visit    3 months ago Tremor of both hands    An Joaquin MD    Office Visit             Future Appointments         Provider Department Appt Notes    In 2 months MD Mata Rowe [place] 3 month

## 2023-05-11 RX ORDER — MONTELUKAST SODIUM 10 MG/1
10 TABLET ORAL NIGHTLY
Qty: 90 TABLET | Refills: 0 | OUTPATIENT
Start: 2023-05-11

## 2023-05-24 ENCOUNTER — PATIENT MESSAGE (OUTPATIENT)
Dept: INTERNAL MEDICINE CLINIC | Facility: CLINIC | Age: 63
End: 2023-05-24

## 2023-05-24 DIAGNOSIS — Z12.31 ENCOUNTER FOR SCREENING MAMMOGRAM FOR BREAST CANCER: Primary | ICD-10-CM

## 2023-05-25 NOTE — TELEPHONE ENCOUNTER
From: Jason Jane  To: Gilda Garcia MD  Sent: 5/24/2023 12:42 PM CDT  Subject: Mammogram    I am due for my yearly mammogram (it was due in March I think-oops). Could you please send me a referral when you can so I can schedule in June when I am off work. ?? Thank you.  Blayne Sanches

## 2023-06-19 RX ORDER — LEVOTHYROXINE SODIUM 0.12 MG/1
125 TABLET ORAL
Qty: 90 TABLET | Refills: 3 | Status: SHIPPED | OUTPATIENT
Start: 2023-06-19

## 2023-06-19 NOTE — TELEPHONE ENCOUNTER
Refill passed per [place] [facility], [facility] protocol.      Requested Prescriptions   Pending Prescriptions Disp Refills    LEVOTHYROXINE 125 MCG Oral Tab [Pharmacy Med Name: Levothyroxine Sodium 125 Mcg Tab Lupi] 90 tablet 0     Sig: Take 1 tablet (125 mcg total) by mouth before breakfast.       Thyroid Medication Protocol Passed - 6/18/2023 10:24 PM        Passed - TSH in past 12 months        Passed - Last TSH value is normal     Lab Results   Component Value Date    TSH 2.620 04/29/2023    [facility] 0.86 01/16/2016                 Passed - In person appointment or virtual visit in the past 12 mos or appointment in next 3 mos     Recent Outpatient Visits              1 month ago [facility]'s [facility] ([facility]    [address], [address], Eduarda Akers MD    Office Visit    3 months ago Sacroiliac joint dysfunction of right side    [address], [address], [place], Nicholas Juju, DO    Office Visit    4 months ago Bilateral lumbar radiculopathy    [facility], [address], [place], Nicholas Juju, DO    Office Visit    5 months ago Bilateral lumbar radiculopathy    [facility], [address], [place], Nicholas Juju, DO    Office Visit    5 months ago Tremor of both hands    [address], [address], Eduarda Akers MD    Office Visit          Future Appointments         Provider Department Appt Notes    In 2 weeks [facility] DEXA RM1; [id number] complete yearly screening    In 3 weeks [facility] Room, PT [facility] in [address] PPO    In 3 weeks [facility]    In [facility] [address] PPO    In 3 weeks [facility] PT [facility] Physical Therapy in [address] PPO    In 4 weeks [facility] Room, PT THE [facility] Physical Therapy in [address] PPO    In 4 weeks Carlyon Eritrean, PT [facility] [address] PPO    In 1 month Carlyon Eritrean, PT [facility] in [address] PPO    In 1 month Carlyon Eritrean, PT [facility] in [address] PPO    In 1 month Carlyon Eritrean, PT [facility] in [address] PPO    In 1 month Carlyon Eritrean, PT [facility] in [address] PPO    In 1 month Carlyon Eritrean, PT [facility] in [address] PPO    In 1 month Carlyon Eritrean, PT [facility] in [address] PPO    In 1 month Carlyon Eritrean, PT [facility] in [address] PPO    In 1 month Carlyon Eritrean, PT [facility] in [address] PPO    In 1 month Adriano Etienne MD [address], [address], [place] 3 month    In 1 month Carlyon Eritrean, PT [facility] [address] PPO    In 1 month Carlyon Eritrean, PT [facility] in [address] PPO                     Recent Outpatient Visits              1 month ago Parkinson's disease [facility])    [address], [address], Clark Bruno MD    Office Visit    3 months ago Sacroiliac joint dysfunction of right side    [address], [address], [place], [place]    Office Visit    4 months ago Bilateral lumbar radiculopathy    [facility], [address], [place], [place]    Office Visit    5 months ago Bilateral lumbar radiculopathy    [address], [address], [place], [place]    Office Visit    5 months ago Tremor of both hands    [address], [place]ore[place] Gosselin, Dustin Chaidez MD    Office Visit             Future Appointments         Provider Department Appt Notes    In 2 weeks [facility] DEXA RM1; [address] Mamm[facility] - [place] complete yearly screening    In 3 weeks Ingrid Colon, PT THE [facility] Physical Therapy in [address] PPO    In 3 weeks Ingrid Colon, PT [facility] Physical Therapy in [address] PPO    In 3 weeks Ingrid Colon, PT [facility] Physical Therapy in [address] PPO    In 3 weeks Ingrid Colon, PT [facility] Physical Therapy in [address] PPO    In 4 weeks Ingrid Colon, PT [facility] Physical Therapy in [address] PPO    In 4 weeks Ingrid Colon, PT [facility] Physical Therapy in [address] PPO    In 1 month Ingrid Colon, PT [facility] Physical Therapy in [address] PPO    In 1 month Ingrid Colon, PT [facility] Physical Therapy in [address] PPO    In 1 month Ingrid Colon, PT [facility] Physical Therapy in [address] PPO    In 1 month Ingrid Colon, PT [facility] Physical Therapy in [address] PPO    In 1 month Ingrid Colon, PT [facility] Physical Therapy in [address] PPO    In 1 month Ingrid Colon, PT [facility] Physical Therapy in [address] PPO    In 1 month Ingrid Colon, PT [facility] Physical Therapy in [address] PPO    In 1 month Ingrid Colon, PT [facility] Physical Therapy in [address] PPO    In 1 month MD Deshawn Guillenseamus Born, [address], [place] 3 month    In 1 month Ingrid Colon, PT [facility] Physical Therapy in [address] PPO    In 1 month Ingrid Colon, PT [facility] in [facility] PPO

## 2023-06-22 RX ORDER — LEVOTHYROXINE SODIUM 0.12 MG/1
125 TABLET ORAL
Qty: 90 TABLET | Refills: 0 | OUTPATIENT
Start: 2023-06-22

## 2023-06-29 ENCOUNTER — TELEPHONE (OUTPATIENT)
Dept: PHYSICAL THERAPY | Facility: HOSPITAL | Age: 63
End: 2023-06-29

## 2023-07-06 ENCOUNTER — HOSPITAL ENCOUNTER (OUTPATIENT)
Dept: MAMMOGRAPHY | Age: 63
Discharge: HOME | End: 2023-07-06
Attending: INTERNAL MEDICINE
Payer: COMMERCIAL

## 2023-07-06 DIAGNOSIS — Z12.31 ENCOUNTER FOR SCREENING MAMMOGRAM FOR BREAST CANCER: ICD-10-CM

## 2023-07-06 PROCEDURE — 77063 BREAST TOMOSYNTHESIS BI: CPT | Performed by: INTERNAL MEDICINE

## 2023-07-06 PROCEDURE — 77067 SCR MAMMO BI INCL CAD: CPT | Performed by: INTERNAL MEDICINE

## 2023-07-10 ENCOUNTER — OFFICE VISIT (OUTPATIENT)
Dept: PHYSICAL THERAPY | Age: 63
End: 2023-07-10
Attending: Other
Payer: COMMERCIAL

## 2023-07-10 DIAGNOSIS — G20 PD (PARKINSON'S DISEASE) (HCC): ICD-10-CM

## 2023-07-10 DIAGNOSIS — R25.1 TREMOR OF BOTH HANDS: Primary | ICD-10-CM

## 2023-07-10 PROCEDURE — 97161 PT EVAL LOW COMPLEX 20 MIN: CPT

## 2023-07-11 ENCOUNTER — OFFICE VISIT (OUTPATIENT)
Dept: PHYSICAL THERAPY | Age: 63
End: 2023-07-11
Attending: Other
Payer: COMMERCIAL

## 2023-07-11 PROCEDURE — 97110 THERAPEUTIC EXERCISES: CPT

## 2023-07-11 PROCEDURE — 97116 GAIT TRAINING THERAPY: CPT

## 2023-07-11 PROCEDURE — 97112 NEUROMUSCULAR REEDUCATION: CPT

## 2023-07-11 NOTE — PROGRESS NOTES
Diagnosis:   Parkinson's disease      Referring Provider: Veena Benito  Date of Evaluation:   7/10/2023    Precautions:  Fall risk Next MD visit:   2023  Date of Surgery: n/a   Insurance Primary/Secondary[place] / N/A       # Auth Visits: 8   Total Timed Treatment: 60 min  Date POC Expires: 10/8/23   Total Treatment time: 60 min       Charges: NMRed 2, EX 1, Gt 1       Treatment Number: 2    Subjective: No complaints following the evaluation, R sided low back pain feels about the same as it always does. 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 Pain: 3/10 R low back    Objective: Gait with small shuffling steps and no arm swing  Sit to stand with decreased control on descent and crepitus L knee  Stiffness of the L foot noted during gait  Tightness of hip flexor and quadriceps  Hypomobility of the lumbar spine with decreased rotation of the trunk    Treatment: Instructed pt in 7 daily maximal exercises with both verbal cues and therapist demonstration of all exercises  Sittin. Floor to ceiling reach with 10 sec hold x 4 reps              2. Side to side reach with 10 sec hold R and L x 4 each  Standin.  Forward step with reach R and L x 4 each                  2. Sideways step and reach R and L x 4 each                  3. Backwards step and reach R and L x 4 each                  4. Forward rock and reach R and L x 10 each                  5. Sideways rock and reach R and L x 10 each  Functional: Sit to stand with BIG cues and demonstration x 5    Gait: BIG walking with cues and demonstration 10 mins    Supine manual stretching, passive lumbar flexion SKTC R/L x 10, hook lying LTR x 10, hamstring stretch R/L 30 sec hold  Passive ankle DF stretch R/L 30 sec hold  L foot general passive motion of mid foot, forefoot and toes including gt toe distraction 2 mins  Prone lying manual quads stretching R/L 30 sec hold  Prone PA mobilizations lumbar spine L2-5 30 secs each Gr 3    Goals:  (to be met in 16 visits)  Increase DGI score to 20/24 to decrease fall risk  Pt will ambulate with normal gait with arm swing, no shuffle  Pt will be able to get on/off the floor without assistance  Pt will be independent in LSVT BIG exercise program  Pt will be able to walk 20 mins without LBP      HEP: 7 daily maximal exercises, 4 reps for exercises 1-5, 10 reps for exercises 6 and 7, handouts issued including homework log for 4 weeks. Sit to stand with BIG reach x 5. Gait using BIG walking with arm swing 5-10 mins daily  Education: Importance of moving BIG    Assessment: Pt was receptive to LSVT BIG exercises and performed fairly well for the first time trying them. Minimal external support was needed with trial of backward stepping, after 5 reps able to perform without support. Pt did not exhibit carryover with BIG walking, educated pt to watch for freezing and to think BIG. Plan: Instruction in LSVT BIG exercise program, exercises, gait training, lumbar spine stretching, joint mobs and toe mobs as needed.

## 2023-07-12 ENCOUNTER — OFFICE VISIT (OUTPATIENT)
Dept: PHYSICAL THERAPY | Age: 63
End: 2023-07-12
Attending: Other
Payer: COMMERCIAL

## 2023-07-12 PROCEDURE — 97110 THERAPEUTIC EXERCISES: CPT

## 2023-07-12 PROCEDURE — 97116 GAIT TRAINING THERAPY: CPT

## 2023-07-12 PROCEDURE — 97112 NEUROMUSCULAR REEDUCATION: CPT

## 2023-07-12 NOTE — PROGRESS NOTES
Diagnosis:   Parkinson's disease      Referring Provider: Aldo Leiva  Date of Evaluation:   7/10/2023    Precautions:  Fall risk Next MD visit:   2023  Date of Surgery: n/a   Insurance Primary/Secondary: [place] / N/A       # Auth Visits: 8   Total Timed Treatment: 60 min  Date POC Expires: 10/8/23   Total Treatment time: 60 min       Charges: NMRed 2, EX 1, Gt 1       Treatment Number: 3    Subjective: Would like to review the exercises, in particular challenged with twisting. R sided low back pain feels about the same as it always does. 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 Floor to ceiling reach with 10 sec hold x 4 reps              2. Side to side reach with 10 sec hold R and L x 4 each  Standin.  Forward step with reach R and L x 4 each                  2. Sideways step and reach R and L x 4 each                  3. Backwards step and reach R and L x 4 each                  4. Forward rock and reach R and L x 10 each                  5. Sideways rock and reach R and L x 10 each  Functional: Sit to stand with BIG cues and demonstration x 5    Gait: Total time 15 min  BIG walking with cues and demonstration on indoor track 3 laps (7 min)  Ascend and descend 2 x 12 steps reciprocal holding railing with supervision  Walk to and from stairs with cues to maintain arm swing    Supine manual stretching, passive lumbar flexion SKTC R/L x 10, hook lying LTR x 10, hamstring stretch R/L 30 sec hold  Passive ankle DF stretch R/L 30 sec hold  L foot general passive motion of mid foot, forefoot and toes including gt toe distraction 2 mins  Prone lying manual quads stretching R/L 30 sec hold  Prone PA mobilizations lumbar spine L2-5 30 secs each Gr 3    Goals:  (to be met in 16 visits)  Increase DGI score to 20/24 to decrease fall risk  Pt will ambulate with normal gait with arm swing, no shuffle  Pt will be able to get on/off the floor without assistance  Pt will be independent in LSVT BIG exercise program  Pt will be able to walk 20 mins without LBP      HEP: 7 daily maximal exercises, 4 reps for exercises 1-5, 10 reps for exercises 6 and 7, handouts issued including homework log for 4 weeks. Sit to stand with BIG reach x 5. Gait using BIG walking with arm swing 5-10 mins daily  Education: Importance of moving BIG    Assessment: Pt was receptive to LSVT BIG exercises and performed well for the second time trying them. No external support was needed with some unsteadiness backward stepping, required cues throughout session. Pt did not exhibit carryover with BIG walking, educated pt to watch for freezing and to think BIG. Plan: Instruction in LSVT BIG exercise program, exercises, gait training, lumbar spine stretching, joint mobs and toe mobs as needed.

## 2023-07-13 ENCOUNTER — MED REC SCAN ONLY (OUTPATIENT)
Dept: INTERNAL MEDICINE CLINIC | Facility: CLINIC | Age: 63
End: 2023-07-13

## 2023-07-13 ENCOUNTER — OFFICE VISIT (OUTPATIENT)
Dept: PHYSICAL THERAPY | Age: 63
End: 2023-07-13
Attending: Other
Payer: COMMERCIAL

## 2023-07-13 PROCEDURE — 97112 NEUROMUSCULAR REEDUCATION: CPT

## 2023-07-13 PROCEDURE — 97116 GAIT TRAINING THERAPY: CPT

## 2023-07-13 PROCEDURE — 97110 THERAPEUTIC EXERCISES: CPT

## 2023-07-13 NOTE — PROGRESS NOTES
Diagnosis:   Parkinson's disease      Referring Provider: Herb Bland  Date of Evaluation:   7/10/2023    Precautions:  Fall risk Next MD visit:   2023  Date of Surgery: n/a   Insurance Primary/Secondary: [place] / N/A       # Auth Visits: 8   Total Timed Treatment: 60 min  Date POC Expires: 10/8/23   Total Treatment time: 60 min       Charges: NMRed 2, EX 1, Gt 1       Treatment Number: 4    Subjective: I am feeling a little looser since starting the exercises, still challenged with twisting. R sided low back pain feels about the same as it always does. 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 Floor to ceiling reach with 10 sec hold x 4 reps              2. Side to side reach with 10 sec hold R and L x 4 each  Standin.  Forward step with reach R and L x 4 each                  2. Sideways step and reach R and L x 4 each                  3. Backwards step and reach R and L x 4 each                  4. Forward rock and reach R and L x 10 each                  5. Sideways rock and reach R and L x 10 each  Functional: Sit to stand with BIG cues and demonstration x 5    Gait: Total time 15 min  BIG walking with cues and demonstration on indoor track 4 laps (9 min)  Ascend and descend 2 x 12 steps reciprocal holding railing with supervision  Walk to and from stairs with cues to maintain arm swing    Supine manual stretching, passive lumbar flexion SKTC R/L x 10, hook lying LTR x 10, hamstring stretch R/L 30 sec hold  Passive ankle DF stretch R/L 30 sec hold  Prone lying manual quads stretching R/L 30 sec hold  Prone PA mobilizations lumbar spine L2-5 30 secs each Gr 3    Goals:  (to be met in 16 visits)  Increase DGI score to 20/24 to decrease fall risk  Pt will ambulate with normal gait with arm swing, no shuffle  Pt will be able to get on/off the floor without assistance  Pt will be independent in LSVT BIG exercise program  Pt will be able to walk 20 mins without LBP      HEP: 7 daily maximal exercises, 4 reps for exercises 1-5, 10 reps for exercises 6 and 7, handouts issued including homework log for 4 weeks. Sit to stand with BIG reach x 5. Gait using BIG walking with arm swing 10 mins daily  Education:  BIG walking, freezing     Assessment: Pt has completed 1 of 4 weeks LSVT BIG exercises and performed well today with no loss of balance or external support needed. throughout session. Pt did not exhibit carryover with BIG walking, educated pt to watch for freezing and to think BIG. Plan: Instruction in LSVT BIG exercise program, exercises, gait training, lumbar spine stretching, joint mobs and toe mobs as needed.

## 2023-07-17 ENCOUNTER — OFFICE VISIT (OUTPATIENT)
Dept: PHYSICAL THERAPY | Age: 63
End: 2023-07-17
Attending: Other
Payer: COMMERCIAL

## 2023-07-17 PROCEDURE — 97110 THERAPEUTIC EXERCISES: CPT

## 2023-07-17 PROCEDURE — 97116 GAIT TRAINING THERAPY: CPT

## 2023-07-17 PROCEDURE — 97112 NEUROMUSCULAR REEDUCATION: CPT

## 2023-07-17 NOTE — PROGRESS NOTES
Diagnosis:   Parkinson's disease      Referring Provider: Carissa Lima  Date of Evaluation:   7/10/2023    Precautions:  Fall risk Next MD visit:   2023  Date of Surgery: n/a   Insurance Primary/Secondary[place] / N/A       # Auth Visits: 8   Total Timed Treatment: 60 min  Date POC Expires: 10/8/23   Total Treatment time: 60 min       Charges: NMRed 2, EX 1, Gt 1       Treatment Number: 5    Subjective: My back is sore today after I babysat for my grandson yesterday. Did my home exercises twice everyday except for yesterday I was only able to do them once, still challenged with twisting. 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 Floor to ceiling reach with 10 sec hold x 4 reps              2. Side to side reach with 10 sec hold R and L x 4 each  Standin.  Forward step with reach R and L x 4 each                  2. Sideways step and reach R and L x 4 each                  3. Backwards step and reach R and L x 4 each                  4. Forward rock and reach R and L x 10 each                  5. Sideways rock and reach R and L x 10 each  Functional: Sit to stand with BIG cues and demonstration x 5    Gait: Total time 15 min  BIG walking with cues and demonstration on indoor track 5 laps (11 min 46 secs)  Ascend and descend 2 x 12 steps reciprocal holding railing with supervision  Walk to and from stairs with cues to maintain arm swing    Supine manual stretching, passive lumbar flexion SKTC R/L x 10, hook lying LTR x 10, hamstring stretch R/L 30 sec hold  Passive ankle DF stretch R/L 30 sec hold  Toe joint mobs 2 mins    Goals:  (to be met in 16 visits)  Increase DGI score to 20/24 to decrease fall risk  Pt will ambulate with normal gait with arm swing, no shuffle  Pt will be able to get on/off the floor without assistance  Pt will be independent in LSVT BIG exercise program  Pt will be able to walk 20 mins without LBP      HEP: 7 daily maximal exercises, 4 reps for exercises 1-5, 10 reps for exercises 6 and 7, handouts issued including homework log for 4 weeks. Sit to stand with BIG reach x 5. Gait using BIG walking with arm swing 10 mins daily  Education:  BIG walking, freezing     Assessment: Tactile cues provided for sideways rock and reach, utilized chair for balance. Pt does well with BIG walking, cues to maintain arm swing when having a conversation (dual tasking)  Pt reports less freezing. Plan: Instruction in LSVT BIG exercise program, exercises, gait training, lumbar spine stretching, joint mobs and toe mobs.

## 2023-07-18 ENCOUNTER — OFFICE VISIT (OUTPATIENT)
Dept: PHYSICAL THERAPY | Age: 63
End: 2023-07-18
Attending: Other
Payer: COMMERCIAL

## 2023-07-18 PROCEDURE — 97116 GAIT TRAINING THERAPY: CPT

## 2023-07-18 PROCEDURE — 97110 THERAPEUTIC EXERCISES: CPT

## 2023-07-18 PROCEDURE — 97112 NEUROMUSCULAR REEDUCATION: CPT

## 2023-07-18 NOTE — PROGRESS NOTES
Diagnosis:   Parkinson's disease      Referring Provider: Sunday Ledbetter  Date of Evaluation:   7/10/2023    Precautions:  Fall risk Next MD visit:   2023  Date of Surgery: n/a   Insurance Primary/Secondary: [place] / N/A       # Auth Visits: 8   Total Timed Treatment: 60 min  Date POC Expires: 10/8/23   Total Treatment time: 60 min       Charges: NMRed 2, EX 1, Gt 1       Treatment Number: 6    Subjective: My back is sore again today. . Did my home exercises last night, still challenged with sideways rock and reach. L knee and L gt toe are sore today. Pain: /10 R low back    Objective: Gait with shuffling steps and slightly improved arm swing  Sit to stand with improved control on descent and crepitus L knee  Stiffness of the L foot noted during gait  Able to walk 5 laps on indoor track 12 mins 20 secs    Treatment: Continued instruction in 7 daily maximal exercises with verbal cues for standing sideways rock and reach and therapist demonstration of all exercises  Sittin. Floor to ceiling reach with 10 sec hold x 4 reps              2. Side to side reach with 10 sec hold R and L x 4 each  Standin.  Forward step with reach R and L x 4 each                  2. Sideways step and reach R and L x 4 each                  3. Backwards step and reach R and L x 4 each                  4. Forward rock and reach R and L x 10 each                  5. Sideways rock and reach R and L x 10 each  Functional: Sit to stand with BIG cues and demonstration x 5    Gait: Total time 15 min  BIG walking with cues and demonstration on indoor track 5 laps (12 min 20 secs)  Ascend and descend 2 x 12 steps reciprocal holding railing with supervision  Walk to and from stairs with cues to maintain arm swing    Prone lying PA mobs L4,5,S1 Gr III 30 secs x 2  Prone quads stretch R/L 2 x 20 sec hold  Prone single leg distraction pull R/L 3 x 10 sec hold  Hook lying gentle LTR x 10  Hook lying abd bracing 10 x 5 sec hold  Hamstring, piriformis stretch R/L 30 sec hold  Single leg pull R 3 x 10 sec hold  Toe joint mobs 2 mins    Goals:  (to be met in 16 visits)  Increase DGI score to 20/24 to decrease fall risk  Pt will ambulate with normal gait with arm swing, no shuffle  Pt will be able to get on/off the floor without assistance  Pt will be independent in [facility] BIG exercise program  Pt will be able to walk 20 mins without LBP      HEP: 7 daily maximal exercises, 4 reps for exercises 1-5, 10 reps for exercises 6 and 7, handouts issued including homework log for 4 weeks. Sit to stand with BIG reach x 5. Gait using BIG walking with arm swing 10 mins daily Hook lying LTR, abd bracing 10 x 5 sec hold  Education:  BIG walking, freezing     Assessment: Verbal cues provided for 7 daily maximal exercises and gait. Pt does well with BIG walking, cues to maintain arm swing when having a conversation (dual tasking)  Pt reports less back pain at the end of treatment today. Advised to perform hook lying LTR and abd bracing exercises when lying down. Plan: Instruction in [facility] BIG exercise program, exercises, gait training, lumbar spine stretching, joint mobs and toe mobs.

## 2023-07-19 ENCOUNTER — OFFICE VISIT (OUTPATIENT)
Dept: PHYSICAL THERAPY | Age: 63
End: 2023-07-19
Attending: Other
Payer: COMMERCIAL

## 2023-07-19 PROCEDURE — 97112 NEUROMUSCULAR REEDUCATION: CPT

## 2023-07-19 PROCEDURE — 97110 THERAPEUTIC EXERCISES: CPT

## 2023-07-19 PROCEDURE — 97116 GAIT TRAINING THERAPY: CPT

## 2023-07-19 NOTE — PROGRESS NOTES
Diagnosis:   Parkinson's disease      Referring Provider: Haylee Choudhary  Date of Evaluation:   7/10/2023    Precautions:  Fall risk Next MD visit:   2023  Date of Surgery: n/a   Insurance Primary/Secondary: [place] / N/A       # Auth Visits: 8   Total Timed Treatment: 60 min  Date POC Expires: 10/8/23   Total Treatment time: 60 min       Charges: NMRed 2, EX 1, Gt 1       Treatment Number: 7    Subjective: My back is sore again today. . Did my home exercises last night, still challenged with sideways rock and reach. L knee and L gt toe are sore today. Pain: 4/10 R low back    Objective: Gait with shuffling steps and slightly improved arm swing  Able to walk 7 laps on indoor track 17 mins 56 secs    Treatment: Continued instruction in 7 daily maximal exercises with verbal cues for standing sideways rock and reach and therapist demonstration of all exercises  Sittin. Floor to ceiling reach with 10 sec hold x 4 reps              2. Side to side reach with 10 sec hold R and L x 4 each  Standin.  Forward step with reach R and L x 4 each                  2. Sideways step and reach R and L x 4 each                  3. Backwards step and reach R and L x 4 each                  4. Forward rock and reach R and L x 10 each                  5. Sideways rock and reach R and L x 10 each  Functional: Sit to stand with BIG cues and demonstration x 5  Floor transfers 5 mins    Gait: Total time 20 min  BIG walking with cues and demonstration on indoor track 7 laps (17 min 56 secs)  Ascend and descend 2 x 12 steps reciprocal holding railing with supervision  Walk to and from stairs with cues to maintain arm swing    Prone lying PA mobs L4,5,S1 Gr III 30 secs x 2  Prone quads stretch R/L 2 x 20 sec hold  Prone single leg distraction pull R/L 3 x 10 sec hold  Hook lying gentle LTR x 10  Hook lying abd bracing 10 x 5 sec hold  Hamstring, piriformis stretch R/L 30 sec hold  Single leg pull R 3 x 10 sec hold  Toe joint mobs 2 mins    Goals:  (to be met in 16 visits)  Increase DGI score to 20/24 to decrease fall risk  Pt will ambulate with normal gait with arm swing, no shuffle  Pt will be able to get on/off the floor without assistance  Pt will be independent in [facility] BIG exercise program  Pt will be able to walk 20 mins without LBP      HEP: 7 daily maximal exercises, 4 reps for exercises 1-5, 10 reps for exercises 6 and 7, handouts issued including homework log for 4 weeks. Sit to stand with BIG reach x 5. Gait using BIG walking with arm swing 10 mins daily Hook lying LTR, abd bracing 10 x 5 sec hold  Education:  BIG walking, freezing     Assessment: Verbal cues provided for 7 daily maximal exercises and gait. Pt does well with BIG walking, cues to maintain arm swing when having a conversation (dual tasking)  Pt reports less back pain at the end of treatment today. Advised to perform hook lying LTR and abd bracing exercises when lying down. Plan: Instruction in [facility] BIG exercise program, exercises, gait training, lumbar spine stretching, joint mobs and toe mobs.

## 2023-07-20 ENCOUNTER — OFFICE VISIT (OUTPATIENT)
Dept: PHYSICAL THERAPY | Age: 63
End: 2023-07-20
Attending: Other
Payer: COMMERCIAL

## 2023-07-20 PROCEDURE — 97110 THERAPEUTIC EXERCISES: CPT

## 2023-07-20 PROCEDURE — 97112 NEUROMUSCULAR REEDUCATION: CPT

## 2023-07-20 PROCEDURE — 97116 GAIT TRAINING THERAPY: CPT

## 2023-07-20 NOTE — PROGRESS NOTES
Diagnosis:   Parkinson's disease      Referring Provider: Gail Whipple  Date of Evaluation:   7/10/2023    Precautions:  Fall risk Next MD visit:   2023  Date of Surgery: n/a   Insurance Primary/Secondary: [place] / N/A       # Auth Visits: 8   Total Timed Treatment: 60 min  Date POC Expires: 10/8/23   Total Treatment time: 60 min       Charges: NMRed 2, EX 1, Gt 1       Treatment Number: 8  Progress Note:  Subjective: My back is sore again today. . Did my home exercises last night, still challenged with sideways rock and reach. L knee and L gt toe are sore today. Pain: 4/10 R low back    Objective: Gait with shuffling steps and slightly improved arm swing  Able to walk 7 laps on indoor track 17 mins 56 secs    Treatment: Continued instruction in 7 daily maximal exercises with verbal cues for standing sideways rock and reach and therapist demonstration of all exercises  Sittin. Floor to ceiling reach with 10 sec hold x 4 reps              2. Side to side reach with 10 sec hold R and L x 4 each  Standin.  Forward step with reach R and L x 4 each                  2. Sideways step and reach R and L x 4 each                  3. Backwards step and reach R and L x 4 each                  4. Forward rock and reach R and L x 10 each                  5. Sideways rock and reach R and L x 10 each  Functional: Sit to stand with BIG cues and demonstration x 5    Gait: Total time 20 min  BIG walking with cues and demonstration on indoor track 6 laps   Ascend and descend 2 x 12 steps reciprocal holding railing with supervision  Walk to and from stairs with cues to maintain arm swing    S lying L performed Gr III rotation mobs L4,5 5 mins  Hamstring, piriformis stretch R/L 30 sec hold  Single leg pull R 3 x 10 sec hold  Toe joint mobs 2 mins    Goals:  (to be met in 16 visits)  Increase DGI score to 20/24 to decrease fall risk  Pt will ambulate with normal gait with arm swing, no shuffle  Pt will be able to get on/off the floor without assistance  Pt will be independent in [facility] BIG exercise program  Pt will be able to walk 20 mins without LBP      HEP: 7 daily maximal exercises, 4 reps for exercises 1-5, 10 reps for exercises 6 and 7, handouts issued including homework log for 4 weeks. Sit to stand with BIG reach x 5. Gait using BIG walking with arm swing 10 mins daily Hook lying LTR, abd bracing 10 x 5 sec hold  Education:  Low back pain, gentle ROM, walking, core strengthening    Assessment: Verbal cues provided for 7 daily maximal exercises and gait. Pt does well with BIG walking, cues to maintain arm swing. Lumbar spine mobs L4,5 rotation Gr 3 for back pain. Plan: Instruction in [facility] BIG exercise program exercises, gait training, lumbar spine stretching, joint mobs and toe mobs.

## 2023-07-24 ENCOUNTER — OFFICE VISIT (OUTPATIENT)
Dept: PHYSICAL THERAPY | Age: 63
End: 2023-07-24
Attending: Other
Payer: COMMERCIAL

## 2023-07-24 PROCEDURE — 97116 GAIT TRAINING THERAPY: CPT

## 2023-07-24 PROCEDURE — 97112 NEUROMUSCULAR REEDUCATION: CPT

## 2023-07-24 PROCEDURE — 97110 THERAPEUTIC EXERCISES: CPT

## 2023-07-24 NOTE — PROGRESS NOTES
Diagnosis:   Parkinson's disease      Referring Provider: Jinny Phalen  Date of Evaluation:   7/10/2023    Precautions:  Fall risk Next MD visit:   2023  Date of Surgery: n/a   Insurance Primary/Secondary: [place] / N/A       # Auth Visits: 8   Total Timed Treatment: 60 min  Date POC Expires: 10/8/23   Total Treatment time: 60 min       Charges: NMRed 2, EX 1, Gt 1       Treatment Number: 9    Subjective: My back is sore again today. . Did my home exercises over the weekend, still challenged with sideways rock and reach. L knee and L gt toe are sore today. Pain: 4/10 R low back    Objective: Gait with shuffling steps and slightly improved arm swing  Able to walk 9 laps on indoor track 20 mins    Treatment: Continued instruction in 7 daily maximal exercises with verbal cues for standing sideways rock and reach and therapist demonstration of all exercises  Sittin. Floor to ceiling reach with 10 sec hold x 4 reps              2. Side to side reach with 10 sec hold R and L x 4 each  Standin.  Forward step with reach R and L x 4 each                  2. Sideways step and reach R and L x 4 each                  3. Backwards step and reach R and L x 4 each                  4. Forward rock and reach R and L x 10 each                  5. Sideways rock and reach R and L x 10 each  Functional: Sit to stand with BIG cues and demonstration x 5    Gait: Total time 25 min  BIG walking with cues and demonstration on indoor track 6 laps   Ascend and descend 2 x 12 steps reciprocal holding railing with supervision  Walk to and from stairs with cues to maintain arm swing    Supine SKTC stretch R/L 3 x 10 sec hold  Hamstring, piriformis stretch R/L 30 sec hold  Single leg pull R 3 x 10 sec hold  Hook lying LTR x 10  Toe joint mobs 2 mins    Goals:  (to be met in 16 visits)  Increase DGI score to 20/24 to decrease fall risk  Pt will ambulate with normal gait with arm swing, no shuffle  Pt will be able to get on/off the floor without assistance  Pt will be independent in [facility] BIG exercise program  Pt will be able to walk 20 mins without LBP      HEP: 7 daily maximal exercises, 4 reps for exercises 1-5, 10 reps for exercises 6 and 7, handouts issued including homework log for 4 weeks. Sit to stand with BIG reach x 5. Gait using BIG walking with arm swing 15 mins daily Hook lying LTR, abd bracing 10 x 5 sec hold  Education:  Low back pain, gentle ROM, walking, core strengthening    Assessment: Verbal cues provided for 7 daily maximal exercises and gait. Pt does well with BIG walking, cues to maintain arm swing. Plan: Instruction in [facility] BIG exercise program exercises, gait training, lumbar spine stretching, joint mobs and toe mobs.

## 2023-07-25 ENCOUNTER — OFFICE VISIT (OUTPATIENT)
Dept: PHYSICAL THERAPY | Age: 63
End: 2023-07-25
Attending: Other
Payer: COMMERCIAL

## 2023-07-25 PROCEDURE — 97116 GAIT TRAINING THERAPY: CPT

## 2023-07-25 PROCEDURE — 97110 THERAPEUTIC EXERCISES: CPT

## 2023-07-25 PROCEDURE — 97112 NEUROMUSCULAR REEDUCATION: CPT

## 2023-07-25 NOTE — PROGRESS NOTES
Diagnosis:   Parkinson's disease      Referring Provider: Luc Quintanilla  Date of Evaluation:   7/10/2023    Precautions:  Fall risk Next MD visit:   2023  Date of Surgery: n/a   Insurance Primary/Secondary: [place] / N/A       # Auth Visits: 8   Total Timed Treatment: 60 min  Date POC Expires: 10/8/23   Total Treatment time: 60 min       Charges: NMRed 2, EX 1, Gt 1       Treatment Number: 9    Subjective: My back and L foot are about the same. Have not experienced freezing, tremor comes and goes. Did my home exercises in the pm at home yesterday, still challenged with sideways rock and reach. Pain: 4/10 R low back    Objective: Gait with shuffling steps and slightly improved arm swing  Able to walk 10 laps on indoor track 22 mins  Able to perform all LSVT BIG exercises without modification    Treatment: 7 daily maximal exercises with verbal cues and therapist demonstration of all exercises    Sittin. Floor to ceiling reach with 10 sec hold x 4 reps              2. Side to side reach with 10 sec hold R and L x 4 each  Standin.  Forward step with reach R and L x 4 each                  2. Sideways step and reach R and L x 4 each                  3. Backwards step and reach R and L x 4 each                  4. Forward rock and reach R and L x 10 each                  5. Sideways rock and reach R and L x 10 each  Functional: Sit to stand with BIG cues and demonstration x 5    Gait: Total time 25 min  BIG walking with cues and demonstration on indoor track 10 laps   Ascend and descend 2 x 12 steps reciprocal holding railing with supervision  Walk to and from stairs with cues to maintain arm swing    Supine SKTC stretch R/L 3 x 10 sec hold  Hamstring, piriformis stretch R/L 30 sec hold  Single leg pull R 3 x 10 sec hold  Hook lying LTR x 10  Toe joint mobs 2 mins    Goals:  (to be met in 16 visits)  Increase DGI score to 20/24 to decrease fall risk  Pt will ambulate with normal gait with arm swing, no shuffle  Pt will be able to get on/off the floor without assistance- met  Pt will be independent in LSV[facility] BIG exercise program  Pt will be able to walk 20 mins without LBP      HEP: 7 daily maximal exercises, 4 reps for exercises 1-5, 10 reps for exercises 6 and 7, handouts issued including homework log for 4 weeks. Sit to stand with BIG reach x 5. Gait using BIG walking with arm swing 15 mins daily Hook lying LTR, abd bracing 10 x 5 sec hold  Education:  Low back pain, gentle ROM, walking, core strengthening    Assessment: Verbal cues provided for 7 daily maximal exercises and gait. Pt does well with BIG walking, cues to maintain arm swing. Plan: Instruction in [facility] BIG exercise program exercises, gait training, lumbar spine stretching, joint mobs and toe mobs.

## 2023-07-26 ENCOUNTER — OFFICE VISIT (OUTPATIENT)
Dept: PHYSICAL THERAPY | Age: 63
End: 2023-07-26
Attending: Other
Payer: COMMERCIAL

## 2023-07-26 PROCEDURE — 97116 GAIT TRAINING THERAPY: CPT

## 2023-07-26 PROCEDURE — 97112 NEUROMUSCULAR REEDUCATION: CPT

## 2023-07-26 NOTE — PROGRESS NOTES
Diagnosis:   Parkinson's disease      Referring Provider: Gail Whipple  Date of Evaluation:   7/10/2023    Precautions:  Fall risk Next MD visit:   2023  Date of Surgery: n/a   Insurance Primary/Secondary: [place] / N/A       # Auth Visits: 8   Total Timed Treatment: 50 min  Date POC Expires: 10/8/23   Total Treatment time: 50 min       Charges: NMRed 2,  Gt 2       Treatment Number: 10    Subjective: My back and L foot are more painful today, no specific reason I can think of. Did not sleep particularly well last night. Have not experienced freezing, tremor comes and goes. Did my home exercises in the pm at home yesterday, rode the stationary bike for 20 mins. Pain: 7/10 R low back, 5/10 L foot    Objective: Gait with shuffling steps and slightly improved arm swing  Able to walk 11 laps on indoor track 24 mins 20 secs  Able to perform all LSVT BIG exercises without modification  Re-assessed DGI score 20/24  Naranjo balance test score 52/56    Treatment: 7 daily maximal exercises with verbal cues and therapist demonstration of all exercises    Sittin. Floor to ceiling reach with 10 sec hold x 4 reps              2. Side to side reach with 10 sec hold R and L x 4 each  Standin.  Forward step with reach R and L x 4 each                  2. Sideways step and reach R and L x 4 each                  3. Backwards step and reach R and L x 4 each                  4. Forward rock and reach R and L x 10 each                  5. Sideways rock and reach R and L x 10 each  Functional: Sit to stand with BIG cues and demonstration x 5    Gait: Total time 30 min  BIG walking with cues and demonstration on indoor track 10 laps   Ascend and descend 2 x 12 steps reciprocal holding railing with supervision  Walk to and from stairs with cues to maintain arm swing    Goals:  (to be met in 16 visits)  Increase DGI score to 20/24 to decrease fall risk  Pt will ambulate with normal gait with arm swing, no shuffle  Pt will be able to get on/off the floor without assistance- met  Pt will be independent in [facility] BIG exercise program  Pt will be able to walk 20 mins without LBP      HEP: 7 daily maximal exercises, 4 reps for exercises 1-5, 10 reps for exercises 6 and 7, handouts issued including homework log for 4 weeks. Sit to stand with BIG reach x 5. Gait using BIG walking with arm swing 15 mins daily Hook lying LTR, abd bracing 10 x 5 sec hold  Education:  Low back pain, gentle ROM, walking, core strengthening    Assessment: Pt walked 1 mile on indoor track with supervision and cues to maintain BIG arms, BIG steps and BIG posture. Verbal cues provided for 7 daily maximal exercises. Plan: Continue L[facility] BIG exercise program exercises, gait training, lumbar spine stretching, joint mobs and toe mobs.

## 2023-07-27 ENCOUNTER — OFFICE VISIT (OUTPATIENT)
Dept: PHYSICAL THERAPY | Age: 63
End: 2023-07-27
Attending: Other
Payer: COMMERCIAL

## 2023-07-27 PROCEDURE — 97112 NEUROMUSCULAR REEDUCATION: CPT

## 2023-07-27 PROCEDURE — 97116 GAIT TRAINING THERAPY: CPT

## 2023-07-27 NOTE — PROGRESS NOTES
Diagnosis:   Parkinson's disease      Referring Provider: Johnathon Spurling  Date of Evaluation:   7/10/2023    Precautions:  Fall risk Next MD visit:   2023  Date of Surgery: n/a   Insurance Primary/Secondary: [place] / N/A       # Auth Visits: 8   Total Timed Treatment: 50 min  Date POC Expires: 10/8/23   Total Treatment time: 50 min       Charges: NMRed 2,  Gt 2       Treatment Number: 10    Subjective: My back and L foot are better today than yesterday. Have not experienced freezing, tremor comes and goes. Did my home exercises in the pm at home yesterday, rode the stationary bike for 20 mins. Pain: 7/10 R low back, 5/10 L foot    Objective: Gait with shuffling steps and slightly improved arm swing  Able to walk 11 laps on indoor track 24 mins 20 secs  Able to perform all LSVT BIG exercises without modification  Re-assessed DGI score 20/24  Naranjo balance test score 52/56    Treatment: 7 daily maximal exercises with verbal cues and therapist demonstration of all exercises    Sittin. Floor to ceiling reach with 10 sec hold x 4 reps              2. Side to side reach with 10 sec hold R and L x 4 each  Standin.  Forward step with reach R and L x 4 each                  2. Sideways step and reach R and L x 4 each                  3. Backwards step and reach R and L x 4 each                  4. Forward rock and reach R and L x 10 each                  5. Sideways rock and reach R and L x 10 each  Functional: Sit to stand with BIG cues and demonstration x 5    Gait: Total time 30 min  BIG walking with cues and demonstration on indoor track 10 laps   Ascend and descend 2 x 12 steps reciprocal holding railing with supervision  Walk to and from stairs with cues to maintain arm swing    Manual stretching SKTC, hamstrings, piriformis, LTR 10 mins    Goals:  (to be met in 16 visits)  Increase DGI score to 20/24 to decrease fall risk  Pt will ambulate with normal gait with arm swing, no shuffle  Pt will be able to get on/off the floor without assistance- met  Pt will be independent in [facility] BIG exercise program  Pt will be able to walk 20 mins without LBP      HEP: 7 daily maximal exercises, 4 reps for exercises 1-5, 10 reps for exercises 6 and 7, handouts issued including homework log for 4 weeks. Sit to stand with BIG reach x 5. Gait using BIG walking with arm swing 15 mins daily Hook lying LTR, abd bracing 10 x 5 sec hold  Education:  Low back pain, gentle ROM, walking, core strengthening    Assessment: Pt walked 1 mile on indoor track with supervision and cues to maintain BIG arms, BIG steps and BIG posture. Verbal cues provided for 7 daily maximal exercises. Plan: Continue LSV[facility] BIG exercise program exercises, gait training, lumbar spine stretching, joint mobs and toe mobs.

## 2023-07-31 ENCOUNTER — OFFICE VISIT (OUTPATIENT)
Dept: PHYSICAL THERAPY | Age: 63
End: 2023-07-31
Attending: Other
Payer: COMMERCIAL

## 2023-07-31 PROCEDURE — 97116 GAIT TRAINING THERAPY: CPT

## 2023-07-31 PROCEDURE — 97112 NEUROMUSCULAR REEDUCATION: CPT

## 2023-07-31 NOTE — PROGRESS NOTES
Diagnosis:   Parkinson's disease      Referring Provider: Chaz Avina  Date of Evaluation:   7/10/2023    Precautions:  Fall risk Next MD visit:   2023  Date of Surgery: n/a   Insurance Primary/Secondary: [place] / N/A       # Auth Visits: 8   Total Timed Treatment: 50 min  Date POC Expires: 10/8/23   Total Treatment time: 50 min       Charges: NMRed 2,  Gt 2       Treatment Number: 13    Subjective: My back and L foot are sore everyday. Have not experienced freezing, tremor comes and goes. Did my home exercises over the weekend once on Friday twice on Saturday and . Walked twice for 30 mins     Pain: 5/10 R low back, 5/10 L foot    Objective: Gait with improved arm swing  Able to walk 11 laps on indoor track 23 mins 46 secs  Able to perform all LSVT BIG exercises without modification  Re-assessed DGI score 20/24  Naranjo balance test score 52/56    Treatment: 7 daily maximal exercises with verbal cues and therapist demonstration of all exercises    Sittin. Floor to ceiling reach with 10 sec hold x 4 reps              2. Side to side reach with 10 sec hold R and L x 4 each  Standin.  Forward step with reach R and L x 4 each                  2. Sideways step and reach R and L x 4 each                  3. Backwards step and reach R and L x 4 each                  4. Forward rock and reach R and L x 10 each                  5. Sideways rock and reach R and L x 10 each  Functional: Sit to stand with BIG cues and demonstration x 5    Gait: Total time 26 min  BIG walking with cues and demonstration on indoor track 11 laps   Ascend and descend 2 x 12 steps reciprocal holding railing with supervision  Walk to and from stairs with cues to maintain arm swing    Passive L ankle DF, PF, IV, EV x 10 each  Passive gt toe flex/ext 2 mins  Distraction gt toe and other toes 2 mins      Goals:  (to be met in 16 visits)  Increase DGI score to 20/24 to decrease fall risk- met  Pt will ambulate with normal gait with arm swing, no shuffle  Pt will be able to get on/off the floor without assistance- met  Pt will be independent in [facility] BIG exercise program- met  Pt will be able to walk 20 mins without LBP- not met      HEP: 7 daily maximal exercises, 4 reps for exercises 1-5, 10 reps for exercises 6 and 7, handouts issued including homework log for 4 weeks. Sit to stand with BIG reach x 5. Gait using BIG walking with arm swing 15 mins daily Hook lying LTR, abd bracing 10 x 5 sec hold  Education:  Low back pain, gentle ROM, walking, core strengthening, [facility] [employer] on-line exercise group information provided    Assessment: Pt walked 1 mile on indoor track with supervision and cues to maintain BIG arms, BIG steps and BIG posture. Verbal cues provided for 7 daily maximal exercises. Plan: Continue remaining x 3 sessions LSV[facility] BIG exercise program exercises, gait training, lumbar spine stretching, joint mobs and toe mobs.

## 2023-08-01 ENCOUNTER — OFFICE VISIT (OUTPATIENT)
Dept: PHYSICAL THERAPY | Age: 63
End: 2023-08-01
Attending: Other
Payer: COMMERCIAL

## 2023-08-01 ENCOUNTER — OFFICE VISIT (OUTPATIENT)
Dept: NEUROLOGY | Facility: CLINIC | Age: 63
End: 2023-08-01
Payer: COMMERCIAL

## 2023-08-01 VITALS — HEIGHT: 64 IN | WEIGHT: 190 LBS | BODY MASS INDEX: 32.44 KG/M2

## 2023-08-01 DIAGNOSIS — G20 PARKINSON'S DISEASE (HCC): Primary | ICD-10-CM

## 2023-08-01 PROCEDURE — 97112 NEUROMUSCULAR REEDUCATION: CPT

## 2023-08-01 PROCEDURE — 97116 GAIT TRAINING THERAPY: CPT

## 2023-08-01 PROCEDURE — 99213 OFFICE O/P EST LOW 20 MIN: CPT | Performed by: OTHER

## 2023-08-01 PROCEDURE — 3008F BODY MASS INDEX DOCD: CPT | Performed by: OTHER

## 2023-08-01 RX ORDER — AMANTADINE HYDROCHLORIDE 100 MG/1
100 CAPSULE, GELATIN COATED ORAL 2 TIMES DAILY
Qty: 180 CAPSULE | Refills: 3 | Status: SHIPPED | OUTPATIENT
Start: 2023-08-01

## 2023-08-01 NOTE — PROGRESS NOTES
Diagnosis:   Parkinson's disease      Referring Provider: Sunday Ledbetter  Date of Evaluation:   7/10/2023    Precautions:  None Next MD visit:   2023  Date of Surgery: n/a   Insurance Primary/Secondary: [place] / N/A       # Auth Visits: 8   Total Timed Treatment: 45 min  Date POC Expires: 10/8/23   Total Treatment time: 45 min       Charges: NMRed 1,  Gt 2       Treatment Number: 14    Subjective: My back and L foot are sore everyday. Have not experienced freezing, tremor comes and goes. Did my home exercises yesterday afternoon, slept well last night. Pain: 4/10 R low back, 5/10 L foot    Objective: Gait with improved arm swing  Able to walk 11 laps on indoor track 24 mins 32 secs  Able to perform all LSVT BIG exercises without modification  Re-assessed DGI score 20/24  Naranjo balance test score 52/56    Treatment: 7 daily maximal exercises with verbal cues and therapist demonstration of all exercises    Sittin. Floor to ceiling reach with 10 sec hold x 4 reps              2. Side to side reach with 10 sec hold R and L x 4 each  Standin.  Forward step with reach R and L x 4 each                  2. Sideways step and reach R and L x 4 each                  3. Backwards step and reach R and L x 4 each                  4. Forward rock and reach R and L x 10 each                  5. Sideways rock and reach R and L x 10 each  Functional: Sit to stand with BIG cues and demonstration x 5    Gait: Total time 28 min  BIG walking with cues and demonstration on indoor track 11 laps   Ascend and descend 2 x 12 steps reciprocal holding railing with supervision  Walk to and from stairs with cues to maintain arm swing    Goals:  (to be met in 16 visits)  Increase DGI score to 20/24 to decrease fall risk- met  Pt will ambulate with normal gait with arm swing, no shuffle  Pt will be able to get on/off the floor without assistance- met  Pt will be independent in LSVT BIG exercise program- met  Pt will be able to walk 20 mins without LBP- not met      HEP: 7 daily maximal exercises, 4 reps for exercises 1-5, 10 reps for exercises 6 and 7, handouts issued including homework log for 4 weeks. Sit to stand with BIG reach x 5. Gait using BIG walking with arm swing 15 mins daily Hook lying LTR, abd bracing 10 x 5 sec hold  Education:  Low back pain, gentle ROM, walking, core strengthening, LSVT [employer] on-line exercise group information provided    Assessment: Pt walked 1 mile on indoor track with supervision and cues to maintain BIG arms, BIG steps and BIG posture. Verbal cues provided for 7 daily maximal exercises. Pt had to leave today after 45 mins to attend another appointment. Plan: Continue remaining x 2 sessions LSVT BIG exercise program exercises, gait training, lumbar spine stretching, joint mobs and toe mobs.

## 2023-08-01 NOTE — PROGRESS NOTES
Neurology follow-up visit     Referred By: Dr. Roberts ref. provider found    Chief Complaint: Patient presents with:  Parkinson's: LOV 05/09/2023 patient presents as a follow up. Patient states she is doing fine, no worse than before. patient is taking  Amantadine HCl 100 MG Oral Capsule. HPI:     Domenica Gupta is a 70year old female, who presents for hand tremors. Physical tremor since beginning of 2022, mostly left-sided, her left arm and left foot will shake occasionally. Her handwriting will change, posture with gait stooped and her walking became slower. Some history of constipation for many years, she does talk during her dreams. Her family history is important for her father with history of Parkinson's and started in his 46s, though it was assumed to be due to encephalitis and exposure to toxic fumes as a . MRI of the brain was done. That was not revealing. Extensive blood work was done, seropositive, T4, methylmalonic acid, mercury, Lyme disease panel, TSH, B12 were checked and those were not revealing. Patient also was ordered to go to [facility] therapy. Patient came back for follow-up and January 2023, at that point she was considering medications. Amantadine and selegiline were tried. Unfortunately they cause side effects. Patient came back for follow-up in May 2023, she had to stop selegiline because of significant elevation of hypertension, amantadine might have caused headaches. She still want to move on some medication since her tremors were significantly embarrassing. Patient came back for follow-up in August 2023. Amantadine was helpful to some degree. She was complaining of constipation and dry mouth she was using stool softener every day.     Past Medical History:   Diagnosis Date    Disorder of thyroid     Esophageal reflux     High cholesterol     Hypercholesterolemia     Hyperlipidemia     Hypothyroidism     Lump, breast     Mitral valve prolapse Obesity (BMI 30-39. 9)     Tremor left hand & sometime legs    Visual impairment     Readers       Past Surgical History:   Procedure Laterality Date    ADENOIDECTOMY      COLONOSCOPY      COLONOSCOPY N/A 2021    Procedure: COLONOSCOPY/ESOPHAGOGASTRODUODENOSCOPY (EGD); Surgeon: Phillip Downing MD;  Location: [address] [place] [address] ENDOSCOPY    ENDOMETRIAL BIOPSY - JAR(S): 2      ZACHARY LOCALIZATION WIRE 1 SITE LEFT (CPT=19281)       appx date    NEEDLE BIOPSY LEFT            x2    OTHER SURGICAL HISTORY Left     excision fibroadenoma breast     TONSILLECTOMY         Social history:  Smoking status:   Never      Smokeless tobacco:   Never       Alcohol use   Not Currently      Comment:   rare          Drug use:   No         Family History   Problem Relation Age of Onset    Stroke Father     Other (Parkinson's disease) Father     Tremor Father         Had Parkinsons disease    Lipids Mother         Elevated cholesterol    Cancer Mother         cervical    Dementia Mother         dx-since 2018    Breast Cancer Paternal Grandmother 48    Cancer Paternal Grandmother     Lipids Brother     Breast Cancer Paternal Cousin Female     Cancer Paternal Grandfather         lung cancer         Current Outpatient Medications:     levothyroxine 125 MCG Oral Tab, Take 1 tablet (125 mcg total) by mouth before breakfast., Disp: 90 tablet, Rfl: 3    pantoprazole 40 MG Oral Tab EC, Take 1 tablet (40 mg total) by mouth nightly., Disp: 90 tablet, Rfl: 3    Amantadine HCl 100 MG Oral Cap, Take 1 capsule (100 mg total) by mouth 2 (two) times daily. , Disp: 180 capsule, Rfl: 1    ROSUVASTATIN 5 MG Oral Tab, Take 1 tablet (5 mg total) by mouth nightly., Disp: 90 tablet, Rfl: 0    fluticasone-salmeterol (ADVAIR HFA) 115-21 MCG/ACT Inhalation Aerosol, Inhale 1 puff into the lungs 2 (two) times daily.  (Patient not taking: Reported on 2023), Disp: 12 g, Rfl: 1    cyanocobalamin 1000 MCG/ML Injection Solution, Inject 1 mL (1,000 mcg total) into the muscle every 30 (thirty) days. (Patient not taking: Reported on 1/11/2023), Disp: 12 each, Rfl: 0    Needle, Disp, 25G X 1\" Does not apply Misc, Use a new needle with each injection (Patient not taking: Reported on 3/15/2023), Disp: 20 each, Rfl: 1    Syringe, Disposable, 3 ML Does not apply Misc, Use a new Syringe with each injection (Patient not taking: Reported on 3/15/2023), Disp: 12 each, Rfl: 0    magnesium 250 MG Oral Tab, Take 1 tablet (250 mg total) by mouth., Disp: , Rfl:     MONTELUKAST 10 MG Oral Tab, Take 1 tablet (10 mg total) by mouth nightly., Disp: 90 tablet, Rfl: 3    OMEGA-3 FATTY ACIDS OR, Take by mouth daily. , Disp: , Rfl:     Ergocalciferol (VITAMIN D CAPS 34323 IU OR), Take 1,000 Units by mouth daily. , Disp: , Rfl:     metoprolol succinate 50 MG Oral Tablet 24 Hr, Take 1 tablet (50 mg total) by mouth daily. , Disp: 30 tablet, Rfl: 11    loratadine 10 MG Oral Tab, Take 1 tablet (10 mg total) by mouth as needed for Allergies. , Disp: , Rfl:     No Known Allergies    ROS:   As in HPI, the rest of the 14 system review was done and was negative      Physical Exam:   08/01/23  1255   Weight: 190 lb (86.2 kg)   Height: 64\"       General: No apparent distress, well nourished, well groomed. Head- Normocephalic, atraumatic  Eyes- No redness or swelling  ENT- Hearing intake, normal glutition  Neck- No masses or adenopathy  Cv: pulses were palpable and normal, no cyanosis or edema     Neurological:     Mental Status- Alert and oriented x3. Normal attention span and concentration  Thought process intact  Memory intact- recent and remote  Mood intact  Fund of knowledge appropriate for education and age    Language intact including: comprehension, naming, repetition, vocabulary, however with bradyphrenia    Cranial Nerves:  II.-Visual fields full to confrontation    III, IV, VI- EOM intact, JOSH  V. Facial sensation and corneal reflexes intact  VII. Face symmetric, no facial weakness.  Mild masked facies  VIII. Hearing intact to whisper, tuning fork or finger rub. IX. Pallet elevates symmetrically. XI. Shoulder shrug is intact  XII. Tongue is midline    Motor Exam:  Muscle tone minimally increased on the left, cogwheel rigidity type. Minimal bradykinesia. Resting tremors of both arms, more so on the left side as well as in her legs. No atrophy or fasciculations  Strength- upper extremities 5/5 proximally and distally                  - lower  extremities 5/5 proximally and distally    Sensory Exam:  Light touch sensation- intact in all 4 extremeties      No clonus  No Babinski sign    Coordination:  Finger to nose intact  Heel to shin intact  Rapid alternating movements are slower on the left    Gait:  Stooped, decreased arm swing, more so on the left, short stride length, minimal en block turning          Labs:    Lab Results   Component Value Date    TSH 2.620 04/29/2023     Lab Results   Component Value Date    HDL 58 04/29/2023     (H) 04/29/2023    TRIG 107 04/29/2023     Lab Results   Component Value Date    HGB 13.2 04/29/2023    HCT 40.6 04/29/2023    MCV 93.8 04/29/2023    WBC 5.9 04/29/2023    .0 04/29/2023      Lab Results   Component Value Date    BUN 15 04/29/2023    CA 9.8 04/29/2023    ALT 22 04/29/2023    AST 17 04/29/2023    ALKPHOS 46 01/16/2016    ALB 3.8 04/29/2023     04/29/2023    K 4.4 04/29/2023     04/29/2023    CO2 29.0 04/29/2023      I have reviewed labs. MRI of the brain was independent reviewed, that was not revealing. Assessment   1. Tremor of both hands  Patient was tremors, more on the left side, some association with possible REM sleep behavior disorder, family history, some mild bradykinesia and rigidity, and constipation raising possibility of Parkinson's disease. Amantadine was very tried and it was helpful in terms of tremors. She did not want to change anything about her management at this point.   She did do LSVT therapy and it was helpful as well. 2. PD (Parkinson's disease) ([facility] [address]    - PHYSICAL THERAPY - INTERNAL           Education and counseling provided to patient. Instructed patient to call my office or seek medical attention immediately if symptoms worsen. Patient verbalized understanding of information given. All questions were answered. All side effects of drugs were discussed. Return to clinic in: No follow-ups on file.     Keith Contreras MD

## 2023-08-02 ENCOUNTER — HOSPITAL ENCOUNTER (OUTPATIENT)
Dept: CT IMAGING | Age: 63
Discharge: HOME | End: 2023-08-02
Attending: INTERNAL MEDICINE

## 2023-08-02 ENCOUNTER — OFFICE VISIT (OUTPATIENT)
Dept: PHYSICAL THERAPY | Age: 63
End: 2023-08-02
Attending: Other
Payer: COMMERCIAL

## 2023-08-02 DIAGNOSIS — Z13.6 ENCOUNTER FOR SCREENING FOR CORONARY ARTERY DISEASE: ICD-10-CM

## 2023-08-02 PROCEDURE — 97116 GAIT TRAINING THERAPY: CPT

## 2023-08-02 PROCEDURE — 97112 NEUROMUSCULAR REEDUCATION: CPT

## 2023-08-02 PROCEDURE — 97110 THERAPEUTIC EXERCISES: CPT

## 2023-08-02 NOTE — PROGRESS NOTES
Diagnosis:   Parkinson's disease      Referring Provider: Janette Lomax  Date of Evaluation:   7/10/2023    Precautions:  None Next MD visit:   2023  Date of Surgery: n/a   Insurance Primary/Secondary: [employer] / N/A       # Auth Visits: 16   Total Timed Treatment: 60 min  Date POC Expires: 10/8/23   Total Treatment time: 60 min       Charges: NMRed 1,  EX 1, Gt 2       Treatment Number: 15    Subjective: My back and L foot are stiff and sore today. Have not experienced freezing, tremor comes and goes. Did my home exercises yesterday afternoon. Appointment with the Neurologist went well, return in 6 months, no medication changes. Pain: 6/10 R low back, 5/10 L foot    Objective: Gait with improved arm swing  Able to walk 11 laps on indoor track 23 mins 32 secs  Able to perform all LSVT BIG exercises without modification  DGI score 20/24  Naranjo balance test score 52/56    Treatment: 7 daily maximal exercises with verbal cues and therapist demonstration of all exercises    Sittin. Floor to ceiling reach with 10 sec hold x 4 reps              2. Side to side reach with 10 sec hold R and L x 4 each  Standin.  Forward step with reach R and L x 4 each                  2. Sideways step and reach R and L x 4 each                  3. Backwards step and reach R and L x 4 each                  4. Forward rock and reach R and L x 10 each                  5. Sideways rock and reach R and L x 10 each  Functional: Sit to stand with BIG cues and demonstration x 5    Gait: Total time 25 min  BIG walking with cues and demonstration on indoor track 11 laps   Ascend and descend 2 x 12 steps reciprocal holding railing with supervision  Walk to and from stairs with cues to maintain arm swing    Hook lying SKTC stretch R/L x 5                    LTR x 10                    Single leg pull distraction L 10 x 5 sec hold                    Passive knee and ankle ROM x 3    Goals:  (to be met in 16 visits)  Increase DGI score to 20/24 to decrease fall risk- met  Pt will ambulate with normal gait with arm swing, no shuffle- able to perform with cues  Pt will be able to get on/off the floor without assistance- met  Pt will be independent in LSVT BIG exercise program- met  Pt will be able to walk 20 mins without LBP- not met      HEP: 7 daily maximal exercises, 4 reps for exercises 1-5, 10 reps for exercises 6 and 7, sit to stand with BIG reach x 5. Gait using BIG walking with arm swing 15 mins daily Hook lying LTR, abd bracing 10 x 5 sec hold  Education:  Low back pain, gentle ROM, walking, core strengthening, LSVT [employer] on-line exercise group information provided    Assessment: Pt walked 1 mile on indoor track with supervision and cues to maintain BIG arms, BIG steps and BIG posture. Verbal cues provided for 7 daily maximal exercises. Pt experienced increased low back pain and stiffness today was observed during exercises. Pt has 1 remaining visit to complete LSVT BIG program, plan to discharge next session. Plan: Continue PT x 1 LSVT BIG exercise program exercises, gait training, lumbar spine stretching, joint mobs and toe mobs.

## 2023-08-03 ENCOUNTER — OFFICE VISIT (OUTPATIENT)
Dept: PHYSICAL THERAPY | Age: 63
End: 2023-08-03
Attending: Other
Payer: COMMERCIAL

## 2023-08-03 PROCEDURE — 97116 GAIT TRAINING THERAPY: CPT

## 2023-08-03 PROCEDURE — 97110 THERAPEUTIC EXERCISES: CPT

## 2023-08-03 PROCEDURE — 97112 NEUROMUSCULAR REEDUCATION: CPT

## 2023-08-03 NOTE — PROGRESS NOTES
Diagnosis:   Parkinson's disease      Referring Provider: Ann Dawson  Date of Evaluation:   7/10/2023    Precautions:  None Next MD visit:   2023  Date of Surgery: n/a   Insurance Primary/Secondary: [place] / N/A       # Auth Visits: 16   Total Timed Treatment: 60 min  Date POC Expires: 10/8/23   Total Treatment time: 60 min       Charges: NMRed 1,  EX 1, Gt 2       Treatment Number: 16  Discharge:  Subjective: My back feels less stiff and sore today, L foot continues to . Have not experienced freezing, tremor comes and goes. Doing home exercises yesterday afternoon. Appointment with the Neurologist went well, return in 6 months, no medication changes. Pain: 4/10 R low back, 5/10 L foot    Objective: Gait with improved arm swing  Able to walk 11 laps on indoor track 24 mins   Able to perform all LSVT BIG exercises without modification  DGI score 22/24  Naranjo balance test score 52/56    Treatment: 7 daily maximal exercises with verbal cues and therapist demonstration of all exercises    Sittin. Floor to ceiling reach with 10 sec hold x 4 reps              2. Side to side reach with 10 sec hold R and L x 4 each  Standin.  Forward step with reach R and L x 4 each                  2. Sideways step and reach R and L x 4 each                  3. Backwards step and reach R and L x 4 each                  4. Forward rock and reach R and L x 10 each                  5. Sideways rock and reach R and L x 10 each  Functional: Sit to stand with BIG cues and demonstration x 5    Gait: Total time 26 min  BIG walking with cues and demonstration on indoor track 11 laps   Ascend and descend 2 x 12 steps reciprocal holding railing with supervision  Walk to and from stairs with cues to maintain arm swing    Hook lying SKTC stretch R/L x 5                    LTR x 10                    Single leg pull distraction L 10 x 5 sec hold                    Passive knee and ankle ROM x 3                    Manual hamstring stretch R/L 30 sec hold    Goals:  (to be met in 16 visits)  Increase DGI score to 20/24 to decrease fall risk- met  Pt will ambulate with normal gait with arm swing, no shuffle- able to perform with cues  Pt will be able to get on/off the floor without assistance- met  Pt will be independent in LSVT BIG exercise program- met  Pt will be able to walk 20 mins without LBP- not met      HEP: 7 daily maximal exercises, 4 reps for exercises 1-5, 10 reps for exercises 6 and 7, sit to stand with BIG reach x 5. Gait using BIG walking with arm swing 15 mins daily Hook lying LTR, abd bracing 10 x 5 sec hold  Education:  Low back pain, gentle ROM, walking, core strengthening, LSVT Global on-line exercise group information provided    Assessment: Pt walked 1 mile on indoor track with supervision and cues to maintain BIG arms, BIG steps and BIG posture. Verbal cues provided for 7 daily maximal exercises. Pt experienced increased low back pain and stiffness today was observed during exercises. Pt will continue with home program at this time.         Plan: Discharge

## 2023-08-22 ENCOUNTER — MED REC SCAN ONLY (OUTPATIENT)
Dept: INTERNAL MEDICINE CLINIC | Facility: CLINIC | Age: 63
End: 2023-08-22

## 2023-10-16 RX ORDER — ROSUVASTATIN CALCIUM 5 MG/1
5 TABLET, COATED ORAL NIGHTLY
Qty: 90 TABLET | Refills: 1 | Status: SHIPPED | OUTPATIENT
Start: 2023-10-16

## 2023-10-17 NOTE — TELEPHONE ENCOUNTER
Gloss[employer] message sent .   CSS=please assists       LAST VISIT 1/3/23;  Instructions    Return in about 3 months (around 4/3/2023), or if symptoms worsen or fail to improve, for Follow up visit.    No future appointments.        Refill passed per [facility] protocol.     Requested Prescriptions   Pending Prescriptions Disp Refills    ROSUVASTATIN 5 MG Oral Tab [Pharmacy Med Name: Rosuvastatin Calcium 5 Mg Tab Nort] 90 tablet 0     Sig: Take 1 tablet (5 mg total) by mouth nightly.       Cholesterol Medication Protocol Passed - 10/15/2023 10:04 PM        Passed - ALT in past 12 months        Passed - LDL in past 12 months        Passed - Last ALT < 80     Lab Results   Component Value Date    ALT 22 04/29/2023             Passed - Last LDL < 130     Lab Results   Component Value Date     (H) 04/29/2023             Passed - In person appointment or virtual visit in the past 12 mos or appointment in next 3 mos     Recent Outpatient Visits              2 months ago     [facility] [place]    Office Visit    2 months ago     [facility] [place]    Office Visit    2 months ago     [facility] [place]    Office Visit    2 months ago Parkinson's disease (HCC)    [facility] Senthil Newberry MD    Office Visit    2 months ago     [facility] [place], PT    Office Visit                                 Recent Outpatient Visits              2 months ago     [facility] [place], PT    Office Visit    2 months ago     [facility] [place], PT    Office Visit    2 months ago     [facility] [place], PT    Office Visit    2 months ago Parkinson's disease (HCC)    [facility]  Senthil Gonzalez MD    Office Visit    2 months ago     [facility] [place] PT    Office Visit

## 2024-01-09 ENCOUNTER — OFFICE VISIT (OUTPATIENT)
Dept: PHYSICAL MEDICINE AND REHAB | Facility: CLINIC | Age: 64
End: 2024-01-09
Payer: COMMERCIAL

## 2024-01-09 VITALS
OXYGEN SATURATION: 97 % | WEIGHT: 190 LBS | HEART RATE: 97 BPM | DIASTOLIC BLOOD PRESSURE: 72 MMHG | HEIGHT: 64 IN | SYSTOLIC BLOOD PRESSURE: 144 MMHG | RESPIRATION RATE: 18 BRPM | BODY MASS INDEX: 32.44 KG/M2

## 2024-01-09 DIAGNOSIS — K21.9 GASTROESOPHAGEAL REFLUX DISEASE WITHOUT ESOPHAGITIS: ICD-10-CM

## 2024-01-09 DIAGNOSIS — G20.A1 PARKINSON'S DISEASE, UNSPECIFIED WHETHER DYSKINESIA PRESENT, UNSPECIFIED WHETHER MANIFESTATIONS FLUCTUATE: ICD-10-CM

## 2024-01-09 DIAGNOSIS — M54.16 BILATERAL LUMBAR RADICULOPATHY: Primary | ICD-10-CM

## 2024-01-09 DIAGNOSIS — R00.2 HEART PALPITATIONS: ICD-10-CM

## 2024-01-09 DIAGNOSIS — E66.9 OBESITY (BMI 30-39.9): ICD-10-CM

## 2024-01-09 PROCEDURE — 99214 OFFICE O/P EST MOD 30 MIN: CPT | Performed by: PHYSICAL MEDICINE & REHABILITATION

## 2024-01-09 PROCEDURE — 3078F DIAST BP <80 MM HG: CPT | Performed by: PHYSICAL MEDICINE & REHABILITATION

## 2024-01-09 PROCEDURE — 3077F SYST BP >= 140 MM HG: CPT | Performed by: PHYSICAL MEDICINE & REHABILITATION

## 2024-01-09 PROCEDURE — 3008F BODY MASS INDEX DOCD: CPT | Performed by: PHYSICAL MEDICINE & REHABILITATION

## 2024-01-09 RX ORDER — MELOXICAM 15 MG/1
15 TABLET ORAL DAILY
Qty: 14 TABLET | Refills: 0 | Status: SHIPPED | OUTPATIENT
Start: 2024-01-09 | End: 2024-01-23

## 2024-01-09 RX ORDER — GABAPENTIN 300 MG/1
CAPSULE ORAL
Qty: 90 CAPSULE | Refills: 0 | Status: SHIPPED | OUTPATIENT
Start: 2024-01-09

## 2024-01-09 NOTE — PATIENT INSTRUCTIONS
Gabapentin 300 mg at nighttime and slowly increase to 3 times daily  Meloxicam 15 mg daily for 2 weeks  Start physical therapy and home exercises  Follow-up in 4 weeks  If no better we will obtain MRI imaging of the lumbar spine

## 2024-01-09 NOTE — PROGRESS NOTES
[facility]  OFFICE FOLLOW UP EVALUATION      HISTORY OF PRESENT ILLNESS:     Chief Complaint   Patient presents with    Follow - Up     Pt is F/U on low back pain that has worsened, admits to Numbness and tingling in both feet, pain 4/10       Patient is following up for back pain.  Patient had a sacroiliac joint injection in March 2023 approximately 1 year ago.  She did not follow-up after but states that the pain did improve for about 1 month.  However pain has progressively worsened and continues to bother her.  She is also experiencing pain in both feet.  She notices tingling sensation in bilateral feet and the first 3 toes more so but also heaviness in both legs.  Prolonged standing and activity.  She does have persistent right-sided low back and buttock pain.  Have some urine incontinence related to stress but denies any loss of bowel bladder control or fevers chills or weight loss.  Pain is 4 out of 10.  Her daughter is a physical therapist who is recommending PT for her which she would like to start in the summer when she is retired.  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  She has had x-ray imaging of the lumbar spine in January.  She has been doing home exercises that she learned from her previous PT program.  She takes Tylenol or ibuprofen as needed for the pain.    PHYSICAL EXAM:   /72   Pulse 97   Resp 18   Ht 64\"   Wt 190 lb (86.2 kg)   LMP 12/01/2016   SpO2 97%   BMI 32.61 kg/m²     Full active range of motion of the lumbar spine.  Gait is normal.  Strength is 5 out of 5 in bilateral lower extremities, sensation intact to light touch in the L4-S1 dermatomes.  Reflexes are +2 and symmetric.  Slump test is negative.  Facet loading is negative.    IMAGING:     X-ray lumbar spine completed on 1/3/2023 was notable for slight lumbar levoscoliosis with multilevel degenerative disc disease most  prominent at L5-S1    All imaging results were reviewed and discussed with patient.      ASSESSMENT/PLAN:     1. Bilateral lumbar radiculopathy    2. Parkinson's disease, unspecified whether dyskinesia present, unspecified whether manifestations fluctuate    3. Heart palpitations    4. Obesity (BMI 30-39.9)    5. Gastroesophageal reflux disease without esophagitis        Reba Price is a 63 year old female following up for low back pain with bilateral lumbar radiculopathy.  I believe she will benefit from starting gabapentin 300 mg at nighttime and slowly increase to 3 times daily as tolerated.  I recommended starting meloxicam for the next 2 weeks as well.  Recommend she start a PT program with home exercises and follow-up with me in 4 weeks.  If pain is no better we will obtain MRI imaging of the lumbar spine.      The patient verbalized understanding with the plan and was in agreement. All questions/concerns were addressed and there were no barriers to learning.  Please note Dragon dictation software was used to dictate this note and may result in inadvertent typos.    Marquise Aponte DO, FAAPMR & CAQSM  Physical Medicine and Rehabilitation  Sports and Spine Medicine    PAST MEDICAL HISTORY:     Past Medical History:   Diagnosis Date    Disorder of thyroid     Esophageal reflux     High cholesterol     Hypercholesterolemia     Hyperlipidemia     Hypothyroidism     Lump, breast     Mitral valve prolapse     Obesity (BMI 30-39.9)     Tremor left hand & sometime legs    Visual impairment     Readers         PAST SURGICAL HISTORY:     Past Surgical History:   Procedure Laterality Date    ADENOIDECTOMY      COLONOSCOPY      COLONOSCOPY N/A 2021    Procedure: COLONOSCOPY/ESOPHAGOGASTRODUODENOSCOPY (EGD);  Surgeon: Chas Moreno MD;  Location: [facility] ENDOSCOPY    ENDOMETRIAL BIOPSY - JAR(S): 2      [facility] LOCALIZATION WIRE 1 SITE LEFT (CPT=19281)       appx date    NEEDLE BIOPSY LEFT             x2    OTHER SURGICAL HISTORY Left     excision fibroadenoma breast     TONSILLECTOMY           CURRENT MEDICATIONS:     Current Outpatient Medications   Medication Sig Dispense Refill    rosuvastatin 5 MG Oral Tab Take 1 tablet (5 mg total) by mouth nightly. 90 tablet 1    Amantadine HCl 100 MG Oral Cap Take 1 capsule (100 mg total) by mouth 2 (two) times daily. 180 capsule 3    levothyroxine 125 MCG Oral Tab Take 1 tablet (125 mcg total) by mouth before breakfast. 90 tablet 3    pantoprazole 40 MG Oral Tab EC Take 1 tablet (40 mg total) by mouth nightly. 90 tablet 3    magnesium 250 MG Oral Tab Take 1 tablet (250 mg total) by mouth.      OMEGA-3 FATTY ACIDS OR Take by mouth daily.      Ergocalciferol (VITAMIN D CAPS 61043 IU OR) Take 1,000 Units by mouth daily.      metoprolol succinate 50 MG Oral Tablet 24 Hr Take 1 tablet (50 mg total) by mouth daily. 30 tablet 11    loratadine 10 MG Oral Tab Take 1 tablet (10 mg total) by mouth as needed for Allergies.           ALLERGIES:   No Known Allergies      FAMILY HISTORY:     Family History   Problem Relation Age of Onset    Stroke Father     Other (Parkinson's disease) Father     Tremor Father         Had Parkinsons disease    Lipids Mother         Elevated cholesterol    Cancer Mother         cervical    Dementia Mother         dx-since 2018    Breast Cancer Paternal Grandmother 50    Cancer Paternal Grandmother     Lipids Brother     Breast Cancer Paternal Cousin Female     Cancer Paternal Grandfather         lung cancer          SOCIAL HISTORY:     Social History     Socioeconomic History    Marital status:    Tobacco Use    Smoking status: Never    Smokeless tobacco: Never    Tobacco comments:     My parents were heavy smokers   Vaping Use    Vaping Use: Never used   Substance and Sexual Activity    Alcohol use: Not Currently     Comment: rare    Drug use: No    Sexual activity: Not Currently   Other Topics Concern    Caffeine Concern Yes     Comment: soda,  8oz daily    Pt has a pacemaker No    Pt has a defibrillator No    Reaction to local anesthetic No          REVIEW OF SYSTEMS:   A comprehensive 10 point review of systems was completed.  Pertinent positives and negatives noted in the the HPI.      LABS:     Lab Results   Component Value Date     (H) 04/29/2023    A1C 6.2 (H) 04/29/2023     Lab Results   Component Value Date    WBC 5.9 04/29/2023    RBC 4.33 04/29/2023    HGB 13.2 04/29/2023    HCT 40.6 04/29/2023    MCV 93.8 04/29/2023    MCH 30.5 04/29/2023    MCHC 32.5 04/29/2023    RDW 11.9 04/29/2023    .0 04/29/2023    MPV 7.0 (L) 01/04/2018     Lab Results   Component Value Date     (H) 04/29/2023    BUN 15 04/29/2023    BUNCREA 16.3 04/29/2023    CREATSERUM 0.92 04/29/2023    ANIONGAP 7 04/29/2023    GFRNAA 68 06/13/2022    GFRAA 79 06/13/2022    CA 9.8 04/29/2023    OSMOCALC 294 04/29/2023    ALKPHO 45 (L) 04/29/2023    AST 17 04/29/2023    ALT 22 04/29/2023    ALKPHOS 46 01/16/2016    BILT 1.0 04/29/2023    TP 7.6 04/29/2023    ALB 3.8 04/29/2023    GLOBULIN 3.8 04/29/2023    AGRATIO 1.1 01/16/2016     04/29/2023    K 4.4 04/29/2023     04/29/2023    CO2 29.0 04/29/2023     Lab Results   Component Value Date    PTP 12.7 03/05/2022    INR 0.95 03/05/2022     Lab Results   Component Value Date    VITD 30.7 04/28/2022

## 2024-01-15 ENCOUNTER — TELEPHONE (OUTPATIENT)
Dept: PHYSICAL THERAPY | Facility: HOSPITAL | Age: 64
End: 2024-01-15

## 2024-01-16 ENCOUNTER — OFFICE VISIT (OUTPATIENT)
Dept: PHYSICAL THERAPY | Facility: HOSPITAL | Age: 64
End: 2024-01-16
Attending: PHYSICAL MEDICINE & REHABILITATION
Payer: COMMERCIAL

## 2024-01-16 ENCOUNTER — TELEPHONE (OUTPATIENT)
Dept: PHYSICAL THERAPY | Facility: HOSPITAL | Age: 64
End: 2024-01-16

## 2024-01-16 DIAGNOSIS — M54.16 BILATERAL LUMBAR RADICULOPATHY: Primary | ICD-10-CM

## 2024-01-16 PROCEDURE — 97162 PT EVAL MOD COMPLEX 30 MIN: CPT

## 2024-01-16 PROCEDURE — 97110 THERAPEUTIC EXERCISES: CPT

## 2024-01-16 NOTE — PROGRESS NOTES
LUMBAR SPINE EVALUATION:   Reba Price    1960  Referring Physician:  Marquise Aponte  Diagnosis: Bilateral lumbar radiculopathy (M54.16)   Initial Evaluation Date: 2024  Date of Surgery: None for this diagnosis   Authorized # of visits NA by 2025    Precautions/Hx: Parkinson's, thyroid disorder, HLD, hypothyroid, MVP, tremor    Past medical history was reviewed with Reba.   Past Medical History:   Diagnosis Date    Disorder of thyroid     Esophageal reflux     High cholesterol     Hypercholesterolemia     Hyperlipidemia     Hypothyroidism     Lump, breast     Mitral valve prolapse     Obesity (BMI 30-39.9)     Tremor left hand & sometime legs    Visual impairment     Readers     Past Surgical History:   Procedure Laterality Date    ADENOIDECTOMY      COLONOSCOPY      COLONOSCOPY N/A 2021    Procedure: COLONOSCOPY/ESOPHAGOGASTRODUODENOSCOPY (EGD);  Surgeon: Chas Moreno MD;  Location[facility] ENDOSCOPY    ENDOMETRIAL BIOPSY - JAR(S): 2013    [facility] LOCALIZATION WIRE 1 SITE LEFT (CPT=19281)       appx date    NEEDLE BIOPSY LEFT            x2    OTHER SURGICAL HISTORY Left     excision fibroadenoma breast     TONSILLECTOMY         PATIENT SUMMARY   Reba Price is a 63 year old y/o female who presents to therapy today with complaints of LBP with pain radiating into the R gluteals.  She notes that she gets tingling in L>R 1st to 3rd toes.  She feels that both LE's are heavy and she has overall stiffness.  Pt has had tremors for years and was diagnosed w Parkinson's last year.  She took the LSVT program and continues to do the exercises from that program.   History of current condition: Many years of LBP. The tingling has been present only for the last few months.   Current functional limitations include, but are not limited to standing, rising from floor, dressing, moving in bed.   Prior treatments: medication anti-inflammatory, injections helped for 2 weeks  Recent  accidents or falls: no  Reba describes prior level of function as able to perform all desired ADL's without difficulty. Pt goals included as physical therapy goals below.     ASSESSMENT:   Pt presents with subjective complaints and functional limitations as noted above.  Pt's function and objective finding are consistent with physician's diagnosis.  The results of the objective tests and measures demonstrate the following limitations: B T-L ps myofascial density; B hand resting tremors; gait deviation; postural alterations; poor abdominal control and tone; decreased trunk ROM; decreased R hip IR; decreased B ankle PF and L hip flex & knee extn strength; and R sciatic nerve mobility limitation.    Pt and PT discussed evaluation findings, pathology, POC and HEP.  Pt voiced understanding and performs HEP correctly without reported pain. Skilled Physical Therapy is medically necessary to address the above impairments and reach functional goals.    In agreement with functional score and clinical rationale, this evaluation involved Moderate Complexity decision making due to 1-2 personal factors/comorbidities, 3 body structures involved/activity limitations, and evolving symptoms including changing pain levels.     SUBJECTIVE:     Visit count 4/15/2024 1/8     Date 2024     LBP/ R gluteals      Pain Range 2-3 to 7/10     Pain Ave 5/10       Better: sitting, heat  Sensation: tingling  Night: falls asleep - difficult ; Position - side; Bedtime - 10; Hours of sleep - 7-8; Wakes - 2-3 for bathroom; Sweats - no.  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 Can have prolonged standing and also has to get on the floor where she sometimes needs assistance to rise from the floor.    Living environment: ranch home, alone, moved 4 years ago  Stairs:  none  Unexplained wt loss: no  Increased pain w coughing, sneezing, straining in the bathroom: no  Blood thinners: no  Diabetes: no  Latex Allergy: no  Pacemaker: no       OBJECTIVE:     Objective Initial Evaluation Data 1/16/2024:    Oswestry Disability Index Score  Score: 30 % (1/15/2024  8:35 PM)    Palpation: dense T-L ps w poor mobility  Observation: L>R B constant hand resting tremors  Gait:        Deviations: trunk flex, shuffling gait       Devices: none       Assistance: none      Posture 1/16/2024   Alignment R foot ER, mild L trunk shift; L foot fwd, B shldr protraction, forward head position, L shldr depressed       Sensation 1/16/2024   Dermatomes Light Touch    Proximal medial thigh R (L2)  wnl   Proximal medial thigh L (L2) wnl   Above knee R (L3) wnl   Above knee L (L3) wnl   Medial arch R(L4) wnl   Medial arch L (L4) wnl   Between 1st - 2nd toes R (L5) wnl   Between 1st - 2nd toes L (L5) wnl   Lateral border of foot R (S1) wnl   Lateral border of foot L (S1) wnl   Popliteal fossa R (S2) wnl   Popliteal fossa L(S2) wnl       Abdominals 1/16/2024   Tone  Poor w breath holding pattern of stability     Diastasis in fingers 1/16/2024   12 cm above umbilicus 1-2   6 cm above umbilicus 1-2   At umbilicus 2   6 cm below umbilicus 1-2   12 cm above umbilicus 1-2       ROM Lumbar 1/16/2024   Flexion Mod*   Extension Mod poor in L4-S1   R SB mod   L SB Min-mod   R Rotation mod   L Rotation Min-mod   * pain limiting    ROM Hip 1/16/2024   Flex R 125 125   Flex L 125 126   ER R 45 60   ER L 45 53   IR R 40 25   IR L 40 40   *pain limiting     MMT 5/5 1/16/2024   L2- hip flex R 5   Hip flex L 5-   L3- knee ext R 5   Knee ext L 5-   L4- ankle DF R 5   Ankle DF L 5   L5- EHL R 5   EHL L 5   S1- ankle PF R 3-   Ankle PF L 3-   S2- Hams R 5   Hams L 5   *pain limiting    LE flexibility 1/16/2024   Piriformis R wnl   Piriformis L wnl   Hams R -31   Hams L -20   *pain limiting    Neural mobility 1/16/2024   SLR R (+)  75 deg   SLR L wnl      BUNNY 1/16/2024   R wnl   L Mod ROM limit        Imaging:     PROCEDURE: XR LUMBAR SPINE (MIN 2 VIEWS) (CPT=72100)     COMPARISON: None.     INDICATIONS: Worsening chronic lower back pain.     TECHNIQUE: Lumbar spine radiographs (2-3 views)       FINDINGS:     ALIGNMENT: Slight levoscoliosis.  VERTEBRAL BODIES:   No acute fracture or malalignment.  There is multilevel degenerative change including facet hypertrophy and mild marginal osteophyte formation.  DISC SPACES: Severe L5-S1 disc space narrowing.  SACROILIAC JOINTS: Intact.  OTHER: Negative.                 Impression   CONCLUSION:     Slight lumbar levoscoliosis with multilevel degenerative change most significant at L5-S1.           Dictated by (CST): Christian Dietrich MD on 1/03/2023 at 10:11 AM      Finalized by (CST): Christian Dietrich MD on 1/03/2023 at 10:12 AM         Treatment performed this date:    Therapeutic Exercise:  Visit #   1/8   Position Exercise HEP 1/16/2024   Supine Sciatic nerve glide H X    DKC w towel exhale H X    LTR H  X    Sitting Leg pump nerve glide H X    Trunk flex exhale H X   H = HEP. Pt given copies of this exercise for home program.  X = Exercises done this date - pt verbalized understanding and demonstrated competence. All exercises done B unless otherwise indicated.   Pt advised to discontinue exercises that increase pain and to call or return to therapist to discuss.  Each intervention above is specifically prescribed to address the patient's identified impairments, activity limitations, and participation restrictions.    ASSESSMENT:     Physical Therapy Goals: From 1/16/2024 to 4/15/2024  - Created with patient input during initial evaluation   1. Pt will be independent in beginning level of HEP for stretching, posture and strengthening.   2. Pt will be able to don/doff socks/shoes without increased symptoms.  3. Pt will be able to stand for 1 hour for work without increased symptoms.  4. Pt will  be able to rise from the floor without increased symptoms.  5. Pt will be able to turn and scoot in bed without increased symptoms.     PLAN OF CARE:      Frequency / Duration: Patient will be seen for 1-2x/week decreasing to every other week for a total of 18 visits over a 90 day period for therapeutic exercises, posture retraining, therapeutic activities, manual treatment, neuromuscular reeducation, therapeutic pain neuroscience education, patient education, modalities as needed, home exercise program.    Education or treatment limitation: None  Rehab Potential:good    Patient was advised of these findings, precautions, goals of treatment, plan of care, beginning self care and treatment options and has agreed to actively participate in planning and for this course of care. Pt educated on possible soreness after evaluation w use of modalities as needed (ice/heat), postural corrections and the importance of staying active.    Charges: PT Lenard 2, TE 1    Total Timed treatment: 12 min      Total Treatment Time: 45 min    Thank you for your referral. Please co-sign or sign and return this letter via fax as soon as possible to [phone]. If you have any questions, please contact me at Dept: [phone]    Sincerely,  Electronically signed by therapist: More Laughlin PT    Physician's certification required: Yes  I certify the need for these services furnished under this plan of treatment and while under my care.    X___________________________________________________ Date____________________    Certification From: 1/16/2024  To:4/15/2024        __________________________________________________________________________________________      21st Century Cures Act Notice to Patient: Medical documents like this are made available to patients in the interest of transparency. However, be advised this is a medical document and it is intended as elqv-ed-wuka communication between your medical providers. This medical  document may contain abbreviations, assessments, medical data, and results or other terms that are unfamiliar. Medical documents are intended to carry relevant information, facts as evident, and the clinical opinion of the practitioner. As such, this medical document may be written in language that appears blunt or direct. You are encouraged to contact your medical provider and/or [facility] Patient Experience if you have any questions about this medical document.

## 2024-01-17 ENCOUNTER — PATIENT MESSAGE (OUTPATIENT)
Dept: PHYSICAL MEDICINE AND REHAB | Facility: CLINIC | Age: 64
End: 2024-01-17

## 2024-01-17 RX ORDER — GABAPENTIN 100 MG/1
100 CAPSULE ORAL 3 TIMES DAILY
Qty: 90 CAPSULE | Refills: 0 | Status: SHIPPED | OUTPATIENT
Start: 2024-01-17

## 2024-01-17 RX ORDER — PREGABALIN 100 MG/1
100 CAPSULE ORAL DAILY
Refills: 0 | Status: CANCELLED
Start: 2024-01-17

## 2024-01-17 NOTE — TELEPHONE ENCOUNTER
From: Reba Price  To: Marquise Aponte  Sent: 1/17/2024 12:36 PM CST  Subject: Gabapentin side effect??    Dr Aponte, 3 days ago--I finally picked up & began the Gabapentin as ordered for the nerve pain. The first 2 nights I had extreme drowsiness within 30 minutes of taking (glad you told me to take at bedtime)which was fine. Last night (day 3) -I woke up 1 hour after administration with severe vertigo (room was spinning) lasting 5-10 minutes & had 4 episodes during sleep with the extreme dizziness/vertigo. I also had some dizziness in AM but went away by the time I was leaving for work. I have no illness symptoms so I assume it is probably the medication causing dizziness symptom.  My question-as I know dizziness is a listed side effect---does it lessen after time taking it? Should I continue to take it or is the vertigo a serious side effect & I should stop it??  Reba Price

## 2024-01-18 NOTE — TELEPHONE ENCOUNTER
Per Dr Aponte \"Please advise patient to decrease gabapentin dosage to 100 mg at nighttime and slowly increase it to 3 times daily if tolerated.  I have ordered the new medication to her pharmacy and recommend discontinue the 300 mg dosage\"

## 2024-01-22 ENCOUNTER — OFFICE VISIT (OUTPATIENT)
Dept: PHYSICAL THERAPY | Facility: HOSPITAL | Age: 64
End: 2024-01-22
Attending: PHYSICAL MEDICINE & REHABILITATION
Payer: COMMERCIAL

## 2024-01-22 PROCEDURE — 97112 NEUROMUSCULAR REEDUCATION: CPT

## 2024-01-22 PROCEDURE — 97110 THERAPEUTIC EXERCISES: CPT

## 2024-01-22 PROCEDURE — 97530 THERAPEUTIC ACTIVITIES: CPT

## 2024-01-22 NOTE — PROGRESS NOTES
Reba SCOTT Price    7/20/1960  Referring Physician:  Marquise Aponte  Diagnosis: Bilateral lumbar radiculopathy (M54.16)   Initial Evaluation Date: 1/16/2024  Date of Surgery: None for this diagnosis   Authorized # of visits NA by 1/8/2025    Precautions/Hx: Parkinson's, thyroid disorder, HLD, hypothyroid, MVP, tremor    SUBJECTIVE:     Pt did not have any adverse reaction to the initial evaluation or treatment.  She is not too sore today.  She did a lot of desk work.  Visit count 4/15/2024 1/8 2/8    Date 1/16/2024 1/22/2024     LBP/ R gluteals      Pain Range 2-3 to 7/10 2-3 to 7/10    Pain Ave 5/10 5/10         OBJECTIVE:     Treatment performed this date:    Therapeutic Exercise:  Visit #   1/8 2/8   Position Exercise HEP 1/16/2024 1/22/2024    Supine Sciatic nerve glide H X X 10x    DKC w towel exhale H X X 10x    LTR H  X  X 10x2    SKC   X 3x   Sitting Leg pump nerve glide H X x    Trunk flex exhale H X x   Standing Glut set   X 10x   Prone Glut set   X     Glut retraining knee lift   X     Glut retraining leg lift   X    Neuro Re-ed   X see assessment    Ther Act Function   X Supine to sit   H = HEP. Pt given copies of this exercise for home program.  X = Exercises done this date - pt verbalized understanding and demonstrated competence. All exercises done B unless otherwise indicated.   Pt advised to discontinue exercises that increase pain and to call or return to therapist to discuss.  Each intervention above is specifically prescribed to address the patient's identified impairments, activity limitations, and participation restrictions.    ASSESSMENT:     Pt educated on the benefits of consistent aerobic exercise to maintain muscle strength, endurance and balance to allow for healthy aging.  Pt shown graph of addition of exercise to continue function instead of prolonged time of weakness and loss of ability while aging.  Discussed the exercise is the magic pill that no one wants to swallow.   Discussed  importance of consistent exercise for strength, mobility and overall health for positive aging. Pt demonstrates all previously instructed exercises with proficiency.  Pt assessed and demonstrates decreased hip strength.  Pt challenged to gain glut max activation in 0 deg hip extn.  Pt improved w cueing and retraining.  Pt educated on importance of end range hip extension control for forward weight shift in locomotion to avoid use of thoracolumbar ps and/or suboccipital substitution patterns.  Pt encouraged to pause w lat wt shift after sit to stand to decrease fall risk.   Physical Therapy Goals: From 1/16/2024 to 4/15/2024  - Created with patient input during initial evaluation   1. Pt will be independent in beginning level of HEP for stretching, posture and strengthening.   2. Pt will be able to don/doff socks/shoes without increased symptoms.  3. Pt will be able to stand for 1 hour for work without increased symptoms.  4. Pt will be able to rise from the floor without increased symptoms.  5. Pt will be able to turn and scoot in bed without increased symptoms.     PLAN OF CARE:      Continue PT per original plan for therapeutic exercises, posture retraining, therapeutic activities, manual treatment, neuromuscular reeducation, therapeutic pain neuroscience education, patient education, self care home management, home exercise program, and modalities as needed.    Charged Units Units Minutes   Ther Ex 2 24   Neuro 1 11   Ther Activity 1 10   Gait     Man Tx          Total Timed  45   Total Tx Time  45           __________________________________________________________________________________________      21st Century Cures Act Notice to Patient: Medical documents like this are made available to patients in the interest of transparency. However, be advised this is a medical document and it is intended as [id number] communication between your medical providers. This medical document may contain abbreviations,  assessments, medical data, and results or other terms that are unfamiliar. Medical documents are intended to carry relevant information, facts as evident, and the clinical opinion of the practitioner. As such, this medical document may be written in language that appears blunt or direct. You are encouraged to contact your medical provider and/or [facility] Patient Experience if you have any questions about this medical document.       Objective Initial Evaluation Data 1/16/2024:    Oswestry Disability Index Score  Score: 30 % (1/15/2024  8:35 PM)    Palpation: dense T-L ps w poor mobility  Observation: L>R B constant hand resting tremors  Gait:        Deviations: trunk flex, shuffling gait       Devices: none       Assistance: none      Posture 1/16/2024   Alignment R foot ER, mild L trunk shift; L foot fwd, B shldr protraction, forward head position, L shldr depressed       Sensation 1/16/2024   Dermatomes Light Touch    Proximal medial thigh R (L2)  wnl   Proximal medial thigh L (L2) wnl   Above knee R (L3) wnl   Above knee L (L3) wnl   Medial arch R(L4) wnl   Medial arch L (L4) wnl   Between 1st - 2nd toes R (L5) wnl   Between 1st - 2nd toes L (L5) wnl   Lateral border of foot R (S1) wnl   Lateral border of foot L (S1) wnl   Popliteal fossa R (S2) wnl   Popliteal fossa L(S2) wnl       Abdominals 1/16/2024   Tone  Poor w breath holding pattern of stability     Diastasis in fingers 1/16/2024   12 cm above umbilicus 1-2   6 cm above umbilicus 1-2   At umbilicus 2   6 cm below umbilicus 1-2   12 cm above umbilicus 1-2       ROM Lumbar 1/16/2024   Flexion Mod*   Extension Mod poor in L4-S1   R SB mod   L SB Min-mod   R Rotation mod   L Rotation Min-mod   * pain limiting    ROM Hip 1/16/2024   Flex R 125 125   Flex L 125 126   ER R 45 60   ER L 45 53   IR R 40 25   IR L 40 40   *pain limiting     MMT 5/5 1/16/2024   L2- hip flex R 5   Hip flex L 5-   L3- knee ext R 5   Knee ext L 5-   L4- ankle DF R 5    Ankle DF L 5   L5- EHL R 5   EHL L 5   S1- ankle PF R 3-   Ankle PF L 3-   S2- Hams R 5   Hams L 5        1/22/2024    Hip abd R 4   Hip abd L 4   Hip extn R 3+   Hip extn L 3+   *pain limiting    LE flexibility 1/16/2024   Piriformis R wnl   Piriformis L wnl   Hams R -31   Hams L -20   *pain limiting    Neural mobility 1/16/2024   SLR R (+) 75 deg   SLR L wnl      BUNNY 1/16/2024   R wnl   L Mod ROM limit        Imaging:     PROCEDURE: XR LUMBAR SPINE (MIN 2 VIEWS) (CPT=72100)     COMPARISON: None.     INDICATIONS: Worsening chronic lower back pain.     TECHNIQUE: Lumbar spine radiographs (2-3 views)       FINDINGS:     ALIGNMENT: Slight levoscoliosis.  VERTEBRAL BODIES:   No acute fracture or malalignment.  There is multilevel degenerative change including facet hypertrophy and mild marginal osteophyte formation.  DISC SPACES: Severe L5-S1 disc space narrowing.  SACROILIAC JOINTS: Intact.  OTHER: Negative.                 Impression   CONCLUSION:     Slight lumbar levoscoliosis with multilevel degenerative change most significant at L5-S1.           Dictated by (CST): Christian Dietrich MD on 1/03/2023 at 10:11 AM      Finalized by (CST): Christian Dietrich MD on 1/03/2023 at 10:12 AM

## 2024-02-01 ENCOUNTER — OFFICE VISIT (OUTPATIENT)
Dept: INTERNAL MEDICINE CLINIC | Facility: CLINIC | Age: 64
End: 2024-02-01
Payer: COMMERCIAL

## 2024-02-01 VITALS
HEART RATE: 89 BPM | WEIGHT: 197 LBS | SYSTOLIC BLOOD PRESSURE: 123 MMHG | HEIGHT: 64 IN | BODY MASS INDEX: 33.63 KG/M2 | DIASTOLIC BLOOD PRESSURE: 79 MMHG

## 2024-02-01 DIAGNOSIS — R73.03 PREDIABETES: ICD-10-CM

## 2024-02-01 DIAGNOSIS — Z13.820 OSTEOPOROSIS SCREENING: ICD-10-CM

## 2024-02-01 DIAGNOSIS — Z00.00 PE (PHYSICAL EXAM), ROUTINE: Primary | ICD-10-CM

## 2024-02-01 DIAGNOSIS — E78.00 PURE HYPERCHOLESTEROLEMIA: ICD-10-CM

## 2024-02-01 DIAGNOSIS — E03.9 ACQUIRED HYPOTHYROIDISM: ICD-10-CM

## 2024-02-01 DIAGNOSIS — Z12.31 SCREENING MAMMOGRAM, ENCOUNTER FOR: ICD-10-CM

## 2024-02-01 PROCEDURE — 99213 OFFICE O/P EST LOW 20 MIN: CPT | Performed by: INTERNAL MEDICINE

## 2024-02-01 PROCEDURE — 3008F BODY MASS INDEX DOCD: CPT | Performed by: INTERNAL MEDICINE

## 2024-02-01 PROCEDURE — 3074F SYST BP LT 130 MM HG: CPT | Performed by: INTERNAL MEDICINE

## 2024-02-01 PROCEDURE — 99397 PER PM REEVAL EST PAT 65+ YR: CPT | Performed by: INTERNAL MEDICINE

## 2024-02-01 PROCEDURE — 3078F DIAST BP <80 MM HG: CPT | Performed by: INTERNAL MEDICINE

## 2024-02-01 NOTE — PROGRESS NOTES
HPI:    Patient ID: Reba Price is a 63 year old female.  Chief Complaint   Patient presents with    Physical    Hypothyroidism       Patient presents today for physical exam, hypothyroidism patient states she has been taking her medications levothyroxine 125 mcg daily and regularly   She is seeing neurologist for Parkinson's disease and she is taking a month at that at this time only patient states she is developing some side effects of the medication but she is seeing neurologist soon for further evaluation and treatment   Patient states she is taking her pantoprazole regularly for heartburns that are stable taking her rosuvastatin 5 mg for elevated close cholesterol regularly as well as metoprolol    For her history of-of palpitations and PVCs that are stable  states doing well otherwise, denies chest pain, shortness of breath, dyspnea on exertion or heart palpitations also denies nausea, vomiting, diarrhea, constipation or abdominal pain. No fever, chills, or UTI symptoms.   The rest of the review of systems, see below.     Patient states she gained some weight     Hyperlipidemia  This is a chronic problem. The current episode started more than 1 year ago. Exacerbating diseases include obesity. Factors aggravating her hyperlipidemia include fatty foods. Pertinent negatives include no chest pain, focal sensory loss, focal weakness, leg pain, myalgias or shortness of breath. Current antihyperlipidemic treatment includes statins. The current treatment provides significant improvement of lipids. Compliance problems include adherence to diet.    Thyroid Problem  This is a chronic problem. The current episode started more than 1 month ago. The problem has been gradually improving. Pertinent negatives include no abdominal pain, chest pain, chills, coughing, fatigue, fever, headaches, myalgias, nausea, sore throat or vomiting. Treatments tried: Levothyroxine 125 mcg daily. The treatment provided significant  relief.       Review of Systems   Constitutional:  Negative for chills, fatigue, fever and unexpected weight change.        Gaining weight    HENT:  Positive for postnasal drip. Negative for ear pain, sore throat and voice change.    Eyes:  Negative for pain and redness.   Respiratory:  Negative for cough, chest tightness, shortness of breath and wheezing.    Cardiovascular:  Negative for chest pain, palpitations and leg swelling.   Gastrointestinal:  Negative for abdominal pain, constipation, diarrhea, nausea and vomiting.        Heartburns at least 3-4 times a week per week   Genitourinary:  Negative for dysuria, frequency, pelvic pain and vaginal bleeding.   Musculoskeletal:  Negative for myalgias.   Skin:  Negative for pallor.   Neurological:  Negative for dizziness, focal weakness and headaches.   Psychiatric/Behavioral:  Negative for confusion. The patient is not nervous/anxious.             Current Outpatient Medications   Medication Sig Dispense Refill    gabapentin 100 MG Oral Cap Take 1 capsule (100 mg total) by mouth 3 (three) times daily. (Patient taking differently: Take 1 capsule (100 mg total) by mouth daily.) 90 capsule 0    rosuvastatin 5 MG Oral Tab Take 1 tablet (5 mg total) by mouth nightly. 90 tablet 1    Amantadine HCl 100 MG Oral Cap Take 1 capsule (100 mg total) by mouth 2 (two) times daily. 180 capsule 3    levothyroxine 125 MCG Oral Tab Take 1 tablet (125 mcg total) by mouth before breakfast. 90 tablet 3    pantoprazole 40 MG Oral Tab EC Take 1 tablet (40 mg total) by mouth nightly. 90 tablet 3    magnesium 250 MG Oral Tab Take 1 tablet (250 mg total) by mouth.      OMEGA-3 FATTY ACIDS OR Take by mouth daily.      Ergocalciferol (VITAMIN D CAPS 56602 IU OR) Take 1,000 Units by mouth daily.      metoprolol succinate 50 MG Oral Tablet 24 Hr Take 1 tablet (50 mg total) by mouth daily. 30 tablet 11    loratadine 10 MG Oral Tab Take 1 tablet (10 mg total) by mouth as needed for Allergies.        Allergies:No Known Allergies   PHYSICAL EXAM:   Physical Exam  Vitals and nursing note reviewed.   Constitutional:       General: She is not in acute distress.     Appearance: She is well-developed.      Comments: obese   HENT:      Head: Normocephalic and atraumatic.      Right Ear: Tympanic membrane, ear canal and external ear normal.      Left Ear: Tympanic membrane, ear canal and external ear normal.      Nose: Nose normal.      Right Sinus: No maxillary sinus tenderness or frontal sinus tenderness.      Left Sinus: No maxillary sinus tenderness or frontal sinus tenderness.      Mouth/Throat:      Pharynx: Uvula midline. No oropharyngeal exudate or posterior oropharyngeal erythema.   Eyes:      General: No scleral icterus.        Right eye: No discharge.         Left eye: No discharge.   Neck:      Thyroid: No thyromegaly.      Vascular: No JVD.   Cardiovascular:      Rate and Rhythm: Normal rate and regular rhythm.      Heart sounds: Normal heart sounds. No murmur heard.  Pulmonary:      Effort: Pulmonary effort is normal. No respiratory distress.      Breath sounds: Normal breath sounds. No wheezing or rales.   Abdominal:      Palpations: Abdomen is soft. There is no mass.      Tenderness: There is no abdominal tenderness.   Musculoskeletal:      Cervical back: Neck supple.   Lymphadenopathy:      Cervical: No cervical adenopathy.   Skin:     General: Skin is warm and dry.   Neurological:      Mental Status: She is alert and oriented to person, place, and time.   Psychiatric:         Behavior: Behavior normal.         Blood pressure 123/79, pulse 89, height 5' 4\" (1.626 m), weight 197 lb (89.4 kg), last menstrual period 12/01/2016, not currently breastfeeding.           ASSESSMENT/PLAN:     1. Physical exam  Maintain a healthy low saturated fat and sugar diet, keep good hydration  Maintain a regular exercise/walking as tolerated   Complete labs as ordered, preventative health maintenance testing discussed,    Mammogram - utd  ordered   colonoscopy -  utd 2028   Pap smear   utd 2022   DEXA scan - ordered   Immunizations discussed    Recommend Pneumonia  prevnar 20   shingles shot ,Tdap , Rsv   Patient verbalized understanding of all instructions and plan of care   - CBC WITH DIFFERENTIAL WITH PLATELET; Future  - COMP METABOLIC PANEL (14); Future  - URINALYSIS, ROUTINE; Future    Pure hypercholesterolemia  Recommended low saturated fat  diet , lean meat turkey and chicken breast , fish in diet , avoid red meat and fast/fried food  Eat more  fruits and vegetables   Be active advised  walking /exercise as  tolerated  Counseling on ideal weight/BMI  Continue present management   Patient is taking rosuvastatin 5 mg and states that she would not be able to tolerate any higher doses than that even if the cholesterol is elevated patient states she will just try better diet- to improve low saturated fat diet   Take medications regularly  -labs to complete fasting in a.m.  - LIPID PANEL; Future       Hypothyroidism due to Hashimoto's thyroiditis  Labs stable cpm   Continue present medicine levothyroxine 125 mcg daily  - TSH W REFLEX TO FREE T4; Future     Gastroesophageal reflux disease, esophagitis presence not specified  Patient advised to avoid spicy food, coffee, tea, caffeinated drinks, alcohol, acidic food/juices  Advised to avoid NSAID's - Aspirin-based medications  Advised to avoid wearing  tight clothes   Advised to elevate the head of the bed   Avoid eating at least 3 hours before bedtime   Counseling on ideal weight/BMI  Continue pantoprazole once daily qd in the morning 30-60 minutes before breakfast always on empty stomach   side effects and directions of medication discussed with patient. Patient verbalized understanding and compliance.   5. Screening mammogram, encounter for  - [facility] SCREENING BILAT (CPT=77067); Future      Prediabetes   Recommend patient to decrease sugar and carbohydrate in  diet  Weight  reduction  Activity and walking as tolerated  Last A1c 6.2 April 23  A1c to complete  Patient diet education      - CBC With Differential With Platelet; Future  - Comp Metabolic Panel (14); Future  - Lipid Panel; Future  - TSH W Reflex To Free T4; Future    Screening mammogram, encounter for  - [facility] BREANNA 2D+3D SCREENING BILAT (CPT=77067/60002); Future     Osteoporosis screening  - XR DEXA BONE DENSITOMETRY (CPT=77080); Future    Prediabetes  - Hemoglobin A1C; Future       Orders Placed This Encounter   Procedures    CBC With Differential With Platelet    Comp Metabolic Panel (14)    Lipid Panel    TSH W Reflex To Free T4    Hemoglobin A1C       Meds This Visit:  Requested Prescriptions      No prescriptions requested or ordered in this encounter       Imaging & Referrals:  XR DEXA BONE DENSITOMETRY (CPT=77080)  [facility] BREANNA 2D+3D SCREENING BILAT (CPT=77067/16195)       ID#[id number]

## 2024-02-12 ENCOUNTER — TELEPHONE (OUTPATIENT)
Dept: PHYSICAL THERAPY | Facility: HOSPITAL | Age: 64
End: 2024-02-12

## 2024-02-20 ENCOUNTER — OFFICE VISIT (OUTPATIENT)
Dept: PHYSICAL THERAPY | Facility: HOSPITAL | Age: 64
End: 2024-02-20
Attending: PHYSICAL MEDICINE & REHABILITATION
Payer: COMMERCIAL

## 2024-02-20 PROCEDURE — 97110 THERAPEUTIC EXERCISES: CPT

## 2024-02-20 PROCEDURE — 97530 THERAPEUTIC ACTIVITIES: CPT

## 2024-02-20 PROCEDURE — 97112 NEUROMUSCULAR REEDUCATION: CPT

## 2024-02-20 NOTE — PROGRESS NOTES
Reba SCOTT Price    7/20/1960  Referring Physician:  Marquise Aponte  Diagnosis: Bilateral lumbar radiculopathy (M54.16)   Initial Evaluation Date: 1/16/2024  Date of Surgery: None for this diagnosis   Authorized # of visits NA by 1/8/2025    Precautions/Hx: Parkinson's, thyroid disorder, HLD, hypothyroid, MVP, tremor    SUBJECTIVE:     Pt reports that she was doing well with decreased pain and then was very active to get ready for a party and doing a lot of lifting.  Pt had increased pain after that.  She also notes that she is stiffer in general and can feel it in her R shoulder as well.    Visit count 4/15/2024 1/8 2/8 3/8   Date 1/16/2024 1/22/2024 2/20/2024    LBP/ R gluteals      Pain Range 2-3 to 7/10 2-3 to 7/10 2-3 to 7/10   Pain Ave 5/10 5/10 4/10        OBJECTIVE:     Treatment performed this date:    Therapeutic Exercise:  Visit #   1/8 2/8 3/8   Position Exercise HEP 1/16/2024 1/22/2024 2/20/2024    Supine Sciatic nerve glide H X X 10x 10x    SKC   X 3x x 2x    DKC w towel exhale H X X 10x 10x    LTR H  X  X 10x2 10x2    Bridging on bolster H   X 5x2 cues for end range and breath    Bridging     X 5x hams cramps    B shldr flex T extn hands clasped H   X 10x, 5x    B shldr ER hands clasped behind neck H   X 10x   Sitting Leg pump nerve glide H X x     Trunk flex exhale H X x    Standing Glut set   X 10x    Prone Glut set   X      Glut retraining knee lift   X      Glut retraining leg lift   X     Neuro Re-ed   X see assessment  X see assessment    Ther Act Function   X Supine to sit X wt shift for gait   H = HEP. Pt given copies of this exercise for home program.  X = Exercises done this date - pt verbalized understanding and demonstrated competence. All exercises done B unless otherwise indicated.   Pt advised to discontinue exercises that increase pain and to call or return to therapist to discuss.  Each intervention above is specifically prescribed to address the patient's identified impairments,  activity limitations, and participation restrictions.    ASSESSMENT:     Pt has been doing well with overall decreased average pain.  She had increased pain with increased activity.  Pt demonstrates decreased R shoulder flex ROM w stiffness.  Pt did well with pulleys for B shoulder mobility, trunk extension, scapular mobility and B trunk lat flex.  Pt educated further on the importance of glut activation for gait.  Pt did well with supine bridging on bolster to activate end range glut control for wt shift.  Pt remains cooperative and motivated, performing HEP consistently.   Physical Therapy Goals: From 1/16/2024 to 4/15/2024  - Created with patient input during initial evaluation   1. Pt will be independent in beginning level of HEP for stretching, posture and strengthening.   2. Pt will be able to don/doff socks/shoes without increased symptoms.  3. Pt will be able to stand for 1 hour for work without increased symptoms.  4. Pt will be able to rise from the floor without increased symptoms.  5. Pt will be able to turn and scoot in bed without increased symptoms.     PLAN OF CARE:      Continue PT per original plan for therapeutic exercises, posture retraining, therapeutic activities, manual treatment, neuromuscular reeducation, therapeutic pain neuroscience education, patient education, self care home management, home exercise program, and modalities as needed.    Charged Units Units Minutes    Ther Ex 2 24   Neuro 1 12   Ther Activity 1 9   Gait     Man Tx          Total Timed  45   Total Tx Time  45           __________________________________________________________________________________________      21st Century Cures Act Notice to Patient: Medical documents like this are made available to patients in the interest of transparency. However, be advised this is a medical document and it is intended as [id number] communication between your medical providers. This medical document may contain abbreviations,  assessments, medical data, and results or other terms that are unfamiliar. Medical documents are intended to carry relevant information, facts as evident, and the clinical opinion of the practitioner. As such, this medical document may be written in language that appears blunt or direct. You are encouraged to contact your medical provider and/or [facility] Patient Experience if you have any questions about this medical document.       Objective Initial Evaluation Data 1/16/2024:    Oswestry Disability Index Score  Score: 30 % (1/15/2024  8:35 PM)    Palpation: dense T-L ps w poor mobility  Observation: L>R B constant hand resting tremors  Gait:        Deviations: trunk flex, shuffling gait       Devices: none       Assistance: none      Posture 1/16/2024   Alignment R foot ER, mild L trunk shift; L foot fwd, B shldr protraction, forward head position, L shldr depressed       Sensation 1/16/2024   Dermatomes Light Touch    Proximal medial thigh R (L2)  wnl   Proximal medial thigh L (L2) wnl   Above knee R (L3) wnl   Above knee L (L3) wnl   Medial arch R(L4) wnl   Medial arch L (L4) wnl   Between 1st - 2nd toes R (L5) wnl   Between 1st - 2nd toes L (L5) wnl   Lateral border of foot R (S1) wnl   Lateral border of foot L (S1) wnl   Popliteal fossa R (S2) wnl   Popliteal fossa L(S2) wnl       Abdominals 1/16/2024   Tone  Poor w breath holding pattern of stability     Diastasis in fingers 1/16/2024   12 cm above umbilicus 1-2   6 cm above umbilicus 1-2   At umbilicus 2   6 cm below umbilicus 1-2   12 cm above umbilicus 1-2       ROM Lumbar 1/16/2024   Flexion Mod*   Extension Mod poor in L4-S1   R SB mod   L SB Min-mod   R Rotation mod   L Rotation Min-mod   * pain limiting    ROM Hip 1/16/2024   Flex R 125 125   Flex L 125 126   ER R 45 60   ER L 45 53   IR R 40 25   IR L 40 40   *pain limiting     MMT 5/5 1/16/2024   L2- hip flex R 5   Hip flex L 5-   L3- knee ext R 5   Knee ext L 5-   L4- ankle DF R 5    Ankle DF L 5   L5- EHL R 5   EHL L 5   S1- ankle PF R 3-   Ankle PF L 3-   S2- Hams R 5   Hams L 5        1/22/2024    Hip abd R 4   Hip abd L 4   Hip extn R 3+   Hip extn L 3+   *pain limiting    LE flexibility 1/16/2024   Piriformis R wnl   Piriformis L wnl   Hams R -31   Hams L -20   *pain limiting    Neural mobility 1/16/2024   SLR R (+) 75 deg   SLR L wnl      BUNNY 1/16/2024   R wnl   L Mod ROM limit        Imaging:     PROCEDURE: XR LUMBAR SPINE (MIN 2 VIEWS) (CPT=72100)     COMPARISON: None.     INDICATIONS: Worsening chronic lower back pain.     TECHNIQUE: Lumbar spine radiographs (2-3 views)       FINDINGS:     ALIGNMENT: Slight levoscoliosis.  VERTEBRAL BODIES:   No acute fracture or malalignment.  There is multilevel degenerative change including facet hypertrophy and mild marginal osteophyte formation.  DISC SPACES: Severe L5-S1 disc space narrowing.  SACROILIAC JOINTS: Intact.  OTHER: Negative.                 Impression   CONCLUSION:     Slight lumbar levoscoliosis with multilevel degenerative change most significant at L5-S1.           Dictated by (CST): Christian Dietrich MD on 1/03/2023 at 10:11 AM      Finalized by (CST): Christian Dietrich MD on 1/03/2023 at 10:12 AM

## 2024-02-27 ENCOUNTER — APPOINTMENT (OUTPATIENT)
Dept: PHYSICAL THERAPY | Facility: HOSPITAL | Age: 64
End: 2024-02-27
Attending: PHYSICAL MEDICINE & REHABILITATION
Payer: COMMERCIAL

## 2024-03-05 ENCOUNTER — OFFICE VISIT (OUTPATIENT)
Dept: NEUROLOGY | Facility: CLINIC | Age: 64
End: 2024-03-05
Payer: COMMERCIAL

## 2024-03-05 ENCOUNTER — OFFICE VISIT (OUTPATIENT)
Dept: PHYSICAL THERAPY | Facility: HOSPITAL | Age: 64
End: 2024-03-05
Attending: PHYSICAL MEDICINE & REHABILITATION
Payer: COMMERCIAL

## 2024-03-05 VITALS — BODY MASS INDEX: 33.63 KG/M2 | HEIGHT: 64 IN | WEIGHT: 197 LBS

## 2024-03-05 DIAGNOSIS — G20.A1 PARKINSON'S DISEASE WITHOUT DYSKINESIA OR FLUCTUATING MANIFESTATIONS: Primary | ICD-10-CM

## 2024-03-05 PROCEDURE — 99214 OFFICE O/P EST MOD 30 MIN: CPT | Performed by: OTHER

## 2024-03-05 PROCEDURE — 97530 THERAPEUTIC ACTIVITIES: CPT

## 2024-03-05 PROCEDURE — 97112 NEUROMUSCULAR REEDUCATION: CPT

## 2024-03-05 PROCEDURE — 97110 THERAPEUTIC EXERCISES: CPT

## 2024-03-05 PROCEDURE — 3008F BODY MASS INDEX DOCD: CPT | Performed by: OTHER

## 2024-03-05 RX ORDER — AMANTADINE HYDROCHLORIDE 100 MG/1
100 CAPSULE, GELATIN COATED ORAL 2 TIMES DAILY
Qty: 180 CAPSULE | Refills: 3 | Status: SHIPPED | OUTPATIENT
Start: 2024-03-05

## 2024-03-05 NOTE — PROGRESS NOTES
Reba CHAVEZ Dee    7/20/1960  Referring Physician:  Marquise Aponte  Diagnosis: Bilateral lumbar radiculopathy (M54.16)   Initial Evaluation Date: 1/16/2024  Date of Surgery: None for this diagnosis   Authorized # of visits NA by 1/8/2025    Precautions/Hx: Parkinson's, thyroid disorder, HLD, hypothyroid, MVP, tremor    SUBJECTIVE:     Pt reports that she is doing a little better.  She continues to take OTC medication for sleep.  Pt states that her HEP is helping her get out of bed in the am.    Visit count 4/15/2024 1/8 2/8 3/8 4/8   Date 1/16/2024 1/22/2024  2/20/2024  3/5/2024    LBP/ R gluteals       Pain Range 2-3 to 7/10 2-3 to 7/10 2-3 to 7/10 2-3 to 5/10   Pain Ave 5/10 5/10 4/10 3-4/10        OBJECTIVE:     Treatment performed this date:    Therapeutic Exercise:  Visit #   2/8 3/8 4/8   Position Exercise HEP 1/22/2024  2/20/2024  3/5/2024    NuStep Seat , Arms , Resist    X 12 min, 3 intervals   Supine Sciatic nerve glide H X 10x 10x 10    SKC  X 3x x 2x 5x    DKC w towel exhale H X 10x 10x 10    LTR H  X 10x2 10x2 10    Bridging on bolster H  X 5x2 cues for end range and breath 10x    Bridging    X 5x hams cramps     B shldr flex T extn hands clasped H  X 10x, 5x 10x    B shldr ER hands clasped behind neck H  X 10x    Sitting Leg pump nerve glide H x      Trunk flex exhale H x  3x   Standing Glut set  X 10x     Prone Glut set  X       Glut retraining knee lift  X       Glut retraining leg lift  X      Neuro Re-ed  X see assessment  X see assessment  X see assessment    Ther Act Function  X Supine to sit X wt shift for gait X sit to stand   H = HEP. Pt given copies of this exercise for home program.  X = Exercises done this date - pt verbalized understanding and demonstrated competence. All exercises done B unless otherwise indicated.   Pt advised to discontinue exercises that increase pain and to call or return to therapist to discuss.  Each intervention above is specifically prescribed to address the  patient's identified impairments, activity limitations, and participation restrictions.    ASSESSMENT:     Pt doing well with improved pain.  Pt instructed in use of aerobic activity with interval work to be performed as part of HEP.  Pt advised to progress very slowly and remain consistent. Pt did well with up to 85 spm for fast pacing on NuStep.  Discussed importance of consistent aerobic activity along w ROM.  Cues given for improved sit to stand.  Physical Therapy Goals: From 1/16/2024 to 4/15/2024  - Created with patient input during initial evaluation   1. Pt will be independent in beginning level of HEP for stretching, posture and strengthening.   2. Pt will be able to don/doff socks/shoes without increased symptoms.  3. Pt will be able to stand for 1 hour for work without increased symptoms.  4. Pt will be able to rise from the floor without increased symptoms.  5. Pt will be able to turn and scoot in bed without increased symptoms.     PLAN OF CARE:      Assess response to interval work.  Continue PT per original plan for therapeutic exercises, posture retraining, therapeutic activities, manual treatment, neuromuscular reeducation, therapeutic pain neuroscience education, patient education, self care home management, home exercise program, and modalities as needed.    Charged Units Units Minutes    Ther Ex 2 23   Neuro 1 14   Ther Activity 1 8   Gait     Man Tx          Total Timed  45   Total Tx Time  45           __________________________________________________________________________________________      21st Century Cures Act Notice to Patient: Medical documents like this are made available to patients in the interest of transparency. However, be advised this is a medical document and it is intended as [id number] communication between your medical providers. This medical document may contain abbreviations, assessments, medical data, and results or other terms that are unfamiliar. Medical documents  are intended to carry relevant information, facts as evident, and the clinical opinion of the practitioner. As such, this medical document may be written in language that appears blunt or direct. You are encouraged to contact your medical provider and/or [facility] Patient Experience if you have any questions about this medical document.       Objective Initial Evaluation Data 1/16/2024:    Oswestry Disability Index Score  Score: 30 % (1/15/2024  8:35 PM)    Palpation: dense T-L ps w poor mobility  Observation: L>R B constant hand resting tremors  Gait:        Deviations: trunk flex, shuffling gait       Devices: none       Assistance: none      Posture 1/16/2024   Alignment R foot ER, mild L trunk shift; L foot fwd, B shldr protraction, forward head position, L shldr depressed       Sensation 1/16/2024   Dermatomes Light Touch    Proximal medial thigh R (L2)  wnl   Proximal medial thigh L (L2) wnl   Above knee R (L3) wnl   Above knee L (L3) wnl   Medial arch R(L4) wnl   Medial arch L (L4) wnl   Between 1st - 2nd toes R (L5) wnl   Between 1st - 2nd toes L (L5) wnl   Lateral border of foot R (S1) wnl   Lateral border of foot L (S1) wnl   Popliteal fossa R (S2) wnl   Popliteal fossa L(S2) wnl       Abdominals 1/16/2024   Tone  Poor w breath holding pattern of stability     Diastasis in fingers 1/16/2024   12 cm above umbilicus 1-2   6 cm above umbilicus 1-2   At umbilicus 2   6 cm below umbilicus 1-2   12 cm above umbilicus 1-2       ROM Lumbar 1/16/2024   Flexion Mod*   Extension Mod poor in L4-S1   R SB mod   L SB Min-mod   R Rotation mod   L Rotation Min-mod   * pain limiting    ROM Hip 1/16/2024   Flex R 125 125   Flex L 125 126   ER R 45 60   ER L 45 53   IR R 40 25   IR L 40 40   *pain limiting     MMT 5/5 1/16/2024   L2- hip flex R 5   Hip flex L 5-   L3- knee ext R 5   Knee ext L 5-   L4- ankle DF R 5   Ankle DF L 5   L5- EHL R 5   EHL L 5   S1- ankle PF R 3-   Ankle PF L 3-   S2- Hams R 5   Hams L  5        1/22/2024    Hip abd R 4   Hip abd L 4   Hip extn R 3+   Hip extn L 3+   *pain limiting    LE flexibility 1/16/2024   Piriformis R wnl   Piriformis L wnl   Hams R -31   Hams L -20   *pain limiting    Neural mobility 1/16/2024   SLR R (+) 75 deg   SLR L wnl      BUNNY 1/16/2024   R wnl   L Mod ROM limit        Imaging:     PROCEDURE: XR LUMBAR SPINE (MIN 2 VIEWS) (CPT=72100)     COMPARISON: None.     INDICATIONS: Worsening chronic lower back pain.     TECHNIQUE: Lumbar spine radiographs (2-3 views)       FINDINGS:     ALIGNMENT: Slight levoscoliosis.  VERTEBRAL BODIES:   No acute fracture or malalignment.  There is multilevel degenerative change including facet hypertrophy and mild marginal osteophyte formation.  DISC SPACES: Severe L5-S1 disc space narrowing.  SACROILIAC JOINTS: Intact.  OTHER: Negative.                 Impression   CONCLUSION:     Slight lumbar levoscoliosis with multilevel degenerative change most significant at L5-S1.           Dictated by (CST): Christian Dietrich MD on 1/03/2023 at 10:11 AM      Finalized by (CST): Christian Dietrich MD on 1/03/2023 at 10:12 AM

## 2024-03-05 NOTE — PROGRESS NOTES
Neurology follow-up visit     Referred By: Dr. Roberts ref. provider found    Chief Complaint:   Chief Complaint   Patient presents with    Parkinson's     LOV 8/1/23 - The pt presents stating that she would like to discuss her medication. States that she is happy with the Amantadine and is unsure if she would like to switch to sinemet. Pt states that the Amantadine has been quite helpful. Pt states that she has noticed that she shuffles more when walking as well as stumbles more often. States her endurance has decreased. Pt confirms that she has been doing mor exercising to increase her stamina and range.        HPI:     Reba Price is a 63 year old female, who presents for hand tremors.  Physical tremor since beginning of 2022, mostly left-sided, her left arm and left foot will shake occasionally.  Her handwriting will change, posture with gait stooped and her walking became slower.  Some history of constipation for many years, she does talk during her dreams.  Her family history is important for her father with history of Parkinson's and started in his 50s, though it was assumed to be due to encephalitis and exposure to toxic fumes as a .     MRI of the brain was done.  That was not revealing.  Extensive blood work was done, seropositive, T4, methylmalonic acid, mercury, Lyme disease panel, TSH, B12 were checked and those were not revealing.    Patient also was ordered to go to [facility] therapy.    Patient came back for follow-up and January 2023, at that point she was considering medications.    Amantadine and selegiline were tried.  Unfortunately they cause side effects.    Patient came back for follow-up in May 2023, she had to stop selegiline because of significant elevation of hypertension, amantadine might have caused headaches.  She still want to move on some medication since her tremors were significantly embarrassing.    Patient came back for follow-up in August 2023.  Amantadine was helpful  to some degree.  She was complaining of constipation and dry mouth she was using stool softener every day.    Patient came back for follow-up in 2024.  More freezing episodes.  She was getting more impaired at her work and she was worried about people noticing her symptoms.    Past Medical History:   Diagnosis Date    Disorder of thyroid     Esophageal reflux     High cholesterol     Hypercholesterolemia     Hyperlipidemia     Hypothyroidism     Lump, breast     Mitral valve prolapse     Obesity (BMI 30-39.9)     Tremor left hand & sometime legs    Visual impairment     Readers       Past Surgical History:   Procedure Laterality Date    ADENOIDECTOMY      COLONOSCOPY      COLONOSCOPY N/A 2021    Procedure: COLONOSCOPY/ESOPHAGOGASTRODUODENOSCOPY (EGD);  Surgeon: Chas Moreno MD;  Location: [facility] ENDOSCOPY    ENDOMETRIAL BIOPSY - JAR(S): 2      [facility] LOCALIZATION WIRE 1 SITE LEFT (CPT=19281)       appx date    NEEDLE BIOPSY LEFT            x2    OTHER SURGICAL HISTORY Left     excision fibroadenoma breast     TONSILLECTOMY         Social history:  History   Smoking Status    Never   Smokeless Tobacco    Never       History   Alcohol Use Not Currently     Comment: rare       History   Drug Use No         Family History   Problem Relation Age of Onset    Stroke Father     Other (Parkinson's disease) Father     Tremor Father         Had Parkinsons disease    Lipids Mother         Elevated cholesterol    Cancer Mother         cervical    Dementia Mother         dx-since 2018    Breast Cancer Paternal Grandmother 50    Cancer Paternal Grandmother     Lipids Brother     Breast Cancer Paternal Cousin Female     Cancer Paternal Grandfather         lung cancer         Current Outpatient Medications:     gabapentin 100 MG Oral Cap, Take 1 capsule (100 mg total) by mouth 3 (three) times daily. (Patient taking differently: Take 1 capsule (100 mg total) by mouth daily.), Disp: 90 capsule, Rfl: 0     rosuvastatin 5 MG Oral Tab, Take 1 tablet (5 mg total) by mouth nightly., Disp: 90 tablet, Rfl: 1    Amantadine HCl 100 MG Oral Cap, Take 1 capsule (100 mg total) by mouth 2 (two) times daily., Disp: 180 capsule, Rfl: 3    levothyroxine 125 MCG Oral Tab, Take 1 tablet (125 mcg total) by mouth before breakfast., Disp: 90 tablet, Rfl: 3    pantoprazole 40 MG Oral Tab EC, Take 1 tablet (40 mg total) by mouth nightly., Disp: 90 tablet, Rfl: 3    magnesium 250 MG Oral Tab, Take 1 tablet (250 mg total) by mouth., Disp: , Rfl:     OMEGA-3 FATTY ACIDS OR, Take by mouth daily., Disp: , Rfl:     Ergocalciferol (VITAMIN D CAPS 68679 IU OR), Take 1,000 Units by mouth daily., Disp: , Rfl:     metoprolol succinate 50 MG Oral Tablet 24 Hr, Take 1 tablet (50 mg total) by mouth daily., Disp: 30 tablet, Rfl: 11    loratadine 10 MG Oral Tab, Take 1 tablet (10 mg total) by mouth as needed for Allergies., Disp: , Rfl:     No Known Allergies    ROS:   As in HPI, the rest of the 14 system review was done and was negative      Physical Exam:  Vitals:    03/05/24 1531   Weight: 197 lb (89.4 kg)   Height: 64\"       General: No apparent distress, well nourished, well groomed.  Head- Normocephalic, atraumatic  Eyes- No redness or swelling  ENT- Hearing intake, normal glutition  Neck- No masses or adenopathy  Cv: pulses were palpable and normal, no cyanosis or edema     Neurological:     Mental Status- Alert and oriented x3.  Normal attention span and concentration  Thought process intact  Memory intact- recent and remote  Mood intact  Fund of knowledge appropriate for education and age    Language intact including: comprehension, naming, repetition, vocabulary, however with bradyphrenia    Cranial Nerves:  II.-Visual fields full to confrontation    III, IV, VI- EOM intact, JOSH  V. Facial sensation and corneal reflexes intact  VII. Face symmetric, no facial weakness. Mild masked facies  VIII. Hearing intact to whisper, tuning fork or finger  rub.  IX. Pallet elevates symmetrically.  XI. Shoulder shrug is intact  XII. Tongue is midline    Motor Exam:  Muscle tone minimally increased on the left, cogwheel rigidity type.  Minimal bradykinesia.  Resting tremors of both arms, more so on the left side as well as in her legs.  No atrophy or fasciculations  Strength- upper extremities 5/5 proximally and distally                  - lower  extremities 5/5 proximally and distally    Sensory Exam:  Light touch sensation- intact in all 4 extremeties      No clonus  No Babinski sign    Coordination:  Finger to nose intact  Heel to shin intact  Rapid alternating movements are slower on the left    Gait:  Stooped, decreased arm swing, more so on the left, short stride length, minimal en block turning          Labs:    Lab Results   Component Value Date    TSH 2.620 04/29/2023     Lab Results   Component Value Date    HDL 58 04/29/2023     (H) 04/29/2023    TRIG 107 04/29/2023     Lab Results   Component Value Date    HGB 13.2 04/29/2023    HCT 40.6 04/29/2023    MCV 93.8 04/29/2023    WBC 5.9 04/29/2023    .0 04/29/2023      Lab Results   Component Value Date    BUN 15 04/29/2023    CA 9.8 04/29/2023    ALT 22 04/29/2023    AST 17 04/29/2023    ALKPHOS 46 01/16/2016    ALB 3.8 04/29/2023     04/29/2023    K 4.4 04/29/2023     04/29/2023    CO2 29.0 04/29/2023      I have reviewed labs.    MRI of the brain was independent reviewed, that was not revealing.    Assessment   1. Tremor of both hands    Amantadine was very tried and it was helpful in terms of tremors.    She did do LSVT therapy and it was helpful as well.  Further progression of disease.  At this point we will start her on a small dose of carbidopa-levodopa.  We discussed potential side effects.    2. PD (Parkinson's disease) ([facility])             Education and counseling provided to patient. Instructed patient to call my office or seek medical attention immediately if symptoms worsen.   Patient verbalized understanding of information given. All questions were answered. All side effects of drugs were discussed.     Return to clinic in: No follow-ups on file.    Senthil Newberry MD

## 2024-03-06 ENCOUNTER — OFFICE VISIT (OUTPATIENT)
Dept: PHYSICAL MEDICINE AND REHAB | Facility: CLINIC | Age: 64
End: 2024-03-06
Payer: COMMERCIAL

## 2024-03-06 VITALS
BODY MASS INDEX: 33.63 KG/M2 | WEIGHT: 197 LBS | HEIGHT: 64 IN | RESPIRATION RATE: 18 BRPM | OXYGEN SATURATION: 97 % | HEART RATE: 100 BPM

## 2024-03-06 DIAGNOSIS — G20.A1 PARKINSON'S DISEASE, UNSPECIFIED WHETHER DYSKINESIA PRESENT, UNSPECIFIED WHETHER MANIFESTATIONS FLUCTUATE: ICD-10-CM

## 2024-03-06 DIAGNOSIS — R00.2 HEART PALPITATIONS: ICD-10-CM

## 2024-03-06 DIAGNOSIS — M54.16 BILATERAL LUMBAR RADICULOPATHY: Primary | ICD-10-CM

## 2024-03-06 DIAGNOSIS — K21.9 GASTROESOPHAGEAL REFLUX DISEASE WITHOUT ESOPHAGITIS: ICD-10-CM

## 2024-03-06 DIAGNOSIS — E66.9 OBESITY (BMI 30-39.9): ICD-10-CM

## 2024-03-06 PROCEDURE — 99214 OFFICE O/P EST MOD 30 MIN: CPT | Performed by: PHYSICAL MEDICINE & REHABILITATION

## 2024-03-06 PROCEDURE — 3008F BODY MASS INDEX DOCD: CPT | Performed by: PHYSICAL MEDICINE & REHABILITATION

## 2024-03-06 NOTE — PATIENT INSTRUCTIONS
Follow up in 6 weeks  Physical therapy and home exercises  Ice and heat as tolerated  Discontinue gabapentin if the symptoms worsen start the 100 mg dose at nighttime and can increase it to twice daily if needed  If pain is still persistent we will get MRI imaging

## 2024-03-07 NOTE — PROGRESS NOTES
[facility]  OFFICE FOLLOW UP EVALUATION      HISTORY OF PRESENT ILLNESS:     Chief Complaint   Patient presents with    Follow - Up     Pt is F/U on bilateral low back pain, Pt states that she is is doing PT and is doing better,still has come Numbness/ tingling. Pt wants to talk about medication. Pain 5/10       Patient is following up for low back pain.  Pain is located bilaterally.  She does have occasional tingling sensation in the leg but pain is much better.  She has been attending PT and doing home exercises.  She has noted good improvement with the PT.  She continues gabapentin twice daily.  She is unsure whether this is providing any significant improvement.  She denies any changes in strength sensation or bowel bladder.    PHYSICAL EXAM:   Pulse 100   Resp 18   Ht 64\"   Wt 197 lb (89.4 kg)   LMP 12/01/2016   SpO2 97%   BMI 33.81 kg/m²     Full active range of motion of the lumbar spine. Gait is normal. Strength is 5 out of 5 in bilateral lower extremities, sensation intact to light touch in the L4-S1 dermatomes. Reflexes are +2 and symmetric. Slump test is negative. Facet loading is negative.     IMAGING:     X-ray lumbar spine completed on 1/3/2023 was notable for slight lumbar levoscoliosis with multilevel degenerative disc disease most prominent at L5-S1     All imaging results were reviewed and discussed with patient.      ASSESSMENT/PLAN:     1. Bilateral lumbar radiculopathy    2. Parkinson's disease, unspecified whether dyskinesia present, unspecified whether manifestations fluctuate    3. Heart palpitations    4. Obesity (BMI 30-39.9)    5. Gastroesophageal reflux disease without esophagitis        Reba Price is a 63 year old female following up for low back pain with bilateral lumbar radiculopathy.  She is doing well overall.  Recommend she continue PT program with home exercises.  We discussed weaning off gabapentin but if the symptoms  return she will resume it again  Recommend that she follow-up in 6 weeks and if no better we will obtain MRI imaging of the lumbar spine for further evaluation.      The patient verbalized understanding with the plan and was in agreement. All questions/concerns were addressed and there were no barriers to learning.  Please note Dragon dictation software was used to dictate this note and may result in inadvertent typos.    Marquise Aponte DO, FAAPMR & CAQSM  Physical Medicine and Rehabilitation  Sports and Spine Medicine    PAST MEDICAL HISTORY:     Past Medical History:   Diagnosis Date    Disorder of thyroid     Esophageal reflux     High cholesterol     Hypercholesterolemia     Hyperlipidemia     Hypothyroidism     Lump, breast     Mitral valve prolapse     Obesity (BMI 30-39.9)     Tremor left hand & sometime legs    Visual impairment     Readers         PAST SURGICAL HISTORY:     Past Surgical History:   Procedure Laterality Date    ADENOIDECTOMY      COLONOSCOPY      COLONOSCOPY N/A 2021    Procedure: COLONOSCOPY/ESOPHAGOGASTRODUODENOSCOPY (EGD);  Surgeon: Chas Moreno MD;  Location[facility] ENDOSCOPY    ENDOMETRIAL BIOPSY - JAR(S): 2013    [facility] LOCALIZATION WIRE 1 SITE LEFT (CPT=19281)       appx date    NEEDLE BIOPSY LEFT            x2    OTHER SURGICAL HISTORY Left     excision fibroadenoma breast     TONSILLECTOMY           CURRENT MEDICATIONS:     Current Outpatient Medications   Medication Sig Dispense Refill    Amantadine HCl 100 MG Oral Cap Take 1 capsule (100 mg total) by mouth 2 (two) times daily. 180 capsule 3    carbidopa-levodopa  MG Oral Tab Start with 1/2 pill TID for 1 week, then 1 pill in an and 1/2 pill at noon and pm for 1 week, then 1 pill in am and noon and 1/2 pill in pm for 1 week then 1 pill  tablet 3    gabapentin 100 MG Oral Cap Take 1 capsule (100 mg total) by mouth 3 (three) times daily. (Patient taking differently: Take 1 capsule (100 mg total) by  mouth daily.) 90 capsule 0    rosuvastatin 5 MG Oral Tab Take 1 tablet (5 mg total) by mouth nightly. 90 tablet 1    levothyroxine 125 MCG Oral Tab Take 1 tablet (125 mcg total) by mouth before breakfast. 90 tablet 3    pantoprazole 40 MG Oral Tab EC Take 1 tablet (40 mg total) by mouth nightly. 90 tablet 3    magnesium 250 MG Oral Tab Take 1 tablet (250 mg total) by mouth.      OMEGA-3 FATTY ACIDS OR Take by mouth daily.      Ergocalciferol (VITAMIN D CAPS 77677 IU OR) Take 1,000 Units by mouth daily.      metoprolol succinate 50 MG Oral Tablet 24 Hr Take 1 tablet (50 mg total) by mouth daily. 30 tablet 11    loratadine 10 MG Oral Tab Take 1 tablet (10 mg total) by mouth as needed for Allergies.           ALLERGIES:   No Known Allergies      FAMILY HISTORY:     Family History   Problem Relation Age of Onset    Stroke Father     Other (Parkinson's disease) Father     Tremor Father         Had Parkinsons disease    Lipids Mother         Elevated cholesterol    Cancer Mother         cervical    Dementia Mother         dx-since 2018    Breast Cancer Paternal Grandmother 50    Cancer Paternal Grandmother     Lipids Brother     Breast Cancer Paternal Cousin Female     Cancer Paternal Grandfather         lung cancer          SOCIAL HISTORY:     Social History     Socioeconomic History    Marital status:    Tobacco Use    Smoking status: Never    Smokeless tobacco: Never    Tobacco comments:     My parents were heavy smokers   Vaping Use    Vaping Use: Never used   Substance and Sexual Activity    Alcohol use: Not Currently     Comment: rare    Drug use: No    Sexual activity: Not Currently   Other Topics Concern    Caffeine Concern Yes     Comment: soda, 8oz daily    Pt has a pacemaker No    Pt has a defibrillator No    Reaction to local anesthetic No          REVIEW OF SYSTEMS:   A comprehensive 10 point review of systems was completed.  Pertinent positives and negatives noted in the the HPI.      LABS:     Lab  Results   Component Value Date     (H) 04/29/2023    A1C 6.2 (H) 04/29/2023     Lab Results   Component Value Date    WBC 5.9 04/29/2023    RBC 4.33 04/29/2023    HGB 13.2 04/29/2023    HCT 40.6 04/29/2023    MCV 93.8 04/29/2023    MCH 30.5 04/29/2023    MCHC 32.5 04/29/2023    RDW 11.9 04/29/2023    .0 04/29/2023    MPV 7.0 (L) 01/04/2018     Lab Results   Component Value Date     (H) 04/29/2023    BUN 15 04/29/2023    BUNCREA 16.3 04/29/2023    CREATSERUM 0.92 04/29/2023    ANIONGAP 7 04/29/2023    GFRNAA 68 06/13/2022    GFRAA 79 06/13/2022    CA 9.8 04/29/2023    OSMOCALC 294 04/29/2023    ALKPHO 45 (L) 04/29/2023    AST 17 04/29/2023    ALT 22 04/29/2023    ALKPHOS 46 01/16/2016    BILT 1.0 04/29/2023    TP 7.6 04/29/2023    ALB 3.8 04/29/2023    GLOBULIN 3.8 04/29/2023    AGRATIO 1.1 01/16/2016     04/29/2023    K 4.4 04/29/2023     04/29/2023    CO2 29.0 04/29/2023     Lab Results   Component Value Date    PTP 12.7 03/05/2022    INR 0.95 03/05/2022     Lab Results   Component Value Date    VITD 30.7 04/28/2022

## 2024-03-12 ENCOUNTER — OFFICE VISIT (OUTPATIENT)
Dept: PHYSICAL THERAPY | Facility: HOSPITAL | Age: 64
End: 2024-03-12
Attending: PHYSICAL MEDICINE & REHABILITATION
Payer: COMMERCIAL

## 2024-03-12 PROCEDURE — 97110 THERAPEUTIC EXERCISES: CPT

## 2024-03-12 PROCEDURE — 97112 NEUROMUSCULAR REEDUCATION: CPT

## 2024-03-12 PROCEDURE — 97530 THERAPEUTIC ACTIVITIES: CPT

## 2024-03-12 NOTE — PROGRESS NOTES
Reba SCOTT Price    7/20/1960  Referring Physician:  Marquise Aponte  Diagnosis: Bilateral lumbar radiculopathy (M54.16)   Initial Evaluation Date: 1/16/2024  Date of Surgery: None for this diagnosis   Authorized # of visits NA by 1/8/2025    Precautions/Hx: Parkinson's, thyroid disorder, HLD, hypothyroid, MVP, tremor    SUBJECTIVE:     Pt reports that she did yardwork and worsened her back pain.    Visit count 4/15/2024 1/8 2/8 3/8 4/8 5/8   Date 1/16/2024 1/22/2024  2/20/2024  3/5/2024  3/12/2024    LBP/ R gluteals        Pain Range 2-3 to 7/10 2-3 to 7/10 2-3 to 7/10 2-3 to 5/10 2 to 6-7/10   Pain Ave 5/10 5/10 4/10 3-4/10 4/10   Pain Current --- --- --- --- 4/10        OBJECTIVE:     Treatment performed this date:    Therapeutic Exercise:  Visit #   3/8 4/8 5/8   Position Exercise HEP 2/20/2024  3/5/2024  3/12/2024    NuStep Seat , Arms , Resist   X 12 min, 3 intervals    Supine Sciatic nerve glide H 10x 10     SKC  x 2x 5x     DKC w towel exhale H 10x 10     LTR H  10x2 10     Bridging on bolster H X 5x2 cues for end range and breath 10x     Bridging   X 5x hams cramps      B shldr flex T extn hands clasped H X 10x, 5x 10x     B shldr ER hands clasped behind neck H X 10x     90-90 Lumbar decompression H   X 20 min w HP    Pelvic tilt    X 10x V/T cues    Abdom set w exhale H   X 5x   Sitting Leg pump nerve glide H   x 10x    Trunk flex exhale H  3x x 5x    Abdom set w exhale H   X 10x   Standing Glut set       Prone Glut set        Glut retraining knee lift        Glut retraining leg lift       Neuro Re-ed  X see assessment  X see assessment  X see assessment    Ther Act Function  X wt shift for gait X sit to stand X gardening   H = HEP. Pt given copies of this exercise for home program.  X = Exercises done this date - pt verbalized understanding and demonstrated competence. All exercises done B unless otherwise indicated.   Pt advised to discontinue exercises that increase pain and to call or return to  therapist to discuss.  Each intervention above is specifically prescribed to address the patient's identified impairments, activity limitations, and participation restrictions.    ASSESSMENT:     Pt had flare up of pain w doing yardwork. Pt educated on the importance of pacing activities to avoid overdoing w boom and bust to allow continued progression of exercise and functional activities.  Pt verbalized understanding.   Discussed use of sitting breaks and her HEP to allow for gardening without getting flare ups.  Also discussed use of HP with 90-90 positioning. Pt instructed in 90-90 positioning for use to gain lumbar decompression and full spinal ps release.  Pt able to perform beginning level abdominal core exercises in the position.  Instructed in performance at home especially for acute spinal symptoms.  Pt verbalized understanding.   Pt educated on importance of consistent mobility for joint and tissue health - motion is lotion. Pt had good relief of pain post tx by 75%.    Physical Therapy Goals: From 1/16/2024 to 4/15/2024  - Created with patient input during initial evaluation   1. Pt will be independent in beginning level of HEP for stretching, posture and strengthening.   2. Pt will be able to don/doff socks/shoes without increased symptoms.  3. Pt will be able to stand for 1 hour for work without increased symptoms.  4. Pt will be able to rise from the floor without increased symptoms.  5. Pt will be able to turn and scoot in bed without increased symptoms.     PLAN OF CARE:      Assess response to use of 90-90 positioning  Continue PT per original plan for therapeutic exercises, posture retraining, therapeutic activities, manual treatment, neuromuscular reeducation, therapeutic pain neuroscience education, patient education, self care home management, home exercise program, and modalities as needed.    Charged Units Units Minutes    Ther Ex 2 23   Neuro 1 14   Ther Activity 1 8   Gait     Man Tx           Total Timed  45   Total Tx Time  45           __________________________________________________________________________________________      21st Century Cures Act Notice to Patient: Medical documents like this are made available to patients in the interest of transparency. However, be advised this is a medical document and it is intended as zopp-ow-eivd communication between your medical providers. This medical document may contain abbreviations, assessments, medical data, and results or other terms that are unfamiliar. Medical documents are intended to carry relevant information, facts as evident, and the clinical opinion of the practitioner. As such, this medical document may be written in language that appears blunt or direct. You are encouraged to contact your medical provider and/or [facility] Patient Experience if you have any questions about this medical document.       Objective Initial Evaluation Data 1/16/2024:    Oswestry Disability Index Score  Score: 30 % (1/15/2024  8:35 PM)    Palpation: dense T-L ps w poor mobility  Observation: L>R B constant hand resting tremors  Gait:        Deviations: trunk flex, shuffling gait       Devices: none       Assistance: none      Posture 1/16/2024   Alignment R foot ER, mild L trunk shift; L foot fwd, B shldr protraction, forward head position, L shldr depressed       Sensation 1/16/2024   Dermatomes Light Touch    Proximal medial thigh R (L2)  wnl   Proximal medial thigh L (L2) wnl   Above knee R (L3) wnl   Above knee L (L3) wnl   Medial arch R(L4) wnl   Medial arch L (L4) wnl   Between 1st - 2nd toes R (L5) wnl   Between 1st - 2nd toes L (L5) wnl   Lateral border of foot R (S1) wnl   Lateral border of foot L (S1) wnl   Popliteal fossa R (S2) wnl   Popliteal fossa L(S2) wnl       Abdominals 1/16/2024   Tone  Poor w breath holding pattern of stability     Diastasis in fingers 1/16/2024   12 cm above umbilicus 1-2   6 cm above umbilicus 1-2   At  umbilicus 2   6 cm below umbilicus 1-2   12 cm above umbilicus 1-2       ROM Lumbar 1/16/2024   Flexion Mod*   Extension Mod poor in L4-S1   R SB mod   L SB Min-mod   R Rotation mod   L Rotation Min-mod   * pain limiting    ROM Hip 1/16/2024   Flex R 125 125   Flex L 125 126   ER R 45 60   ER L 45 53   IR R 40 25   IR L 40 40   *pain limiting     MMT 5/5 1/16/2024   L2- hip flex R 5   Hip flex L 5-   L3- knee ext R 5   Knee ext L 5-   L4- ankle DF R 5   Ankle DF L 5   L5- EHL R 5   EHL L 5   S1- ankle PF R 3-   Ankle PF L 3-   S2- Hams R 5   Hams L 5        1/22/2024    Hip abd R 4   Hip abd L 4   Hip extn R 3+   Hip extn L 3+   *pain limiting    LE flexibility 1/16/2024   Piriformis R wnl   Piriformis L wnl   Hams R -31   Hams L -20   *pain limiting    Neural mobility 1/16/2024   SLR R (+) 75 deg   SLR L wnl      BUNNY 1/16/2024   R wnl   L Mod ROM limit        Imaging:     PROCEDURE: XR LUMBAR SPINE (MIN 2 VIEWS) (CPT=72100)     COMPARISON: None.     INDICATIONS: Worsening chronic lower back pain.     TECHNIQUE: Lumbar spine radiographs (2-3 views)       FINDINGS:     ALIGNMENT: Slight levoscoliosis.  VERTEBRAL BODIES:   No acute fracture or malalignment.  There is multilevel degenerative change including facet hypertrophy and mild marginal osteophyte formation.  DISC SPACES: Severe L5-S1 disc space narrowing.  SACROILIAC JOINTS: Intact.  OTHER: Negative.                 Impression   CONCLUSION:     Slight lumbar levoscoliosis with multilevel degenerative change most significant at L5-S1.           Dictated by (CST): Christian Dietrich MD on 1/03/2023 at 10:11 AM      Finalized by (CST): Christian Dietrich MD on 1/03/2023 at 10:12 AM

## 2024-03-19 ENCOUNTER — OFFICE VISIT (OUTPATIENT)
Dept: PHYSICAL THERAPY | Facility: HOSPITAL | Age: 64
End: 2024-03-19
Attending: PHYSICAL MEDICINE & REHABILITATION
Payer: COMMERCIAL

## 2024-03-19 PROCEDURE — 97110 THERAPEUTIC EXERCISES: CPT

## 2024-03-19 PROCEDURE — 97530 THERAPEUTIC ACTIVITIES: CPT

## 2024-03-19 PROCEDURE — 97112 NEUROMUSCULAR REEDUCATION: CPT

## 2024-03-19 NOTE — PROGRESS NOTES
Reba Price    7/20/1960  Referring Physician:  Marquise Aponte  Diagnosis: Bilateral lumbar radiculopathy (M54.16)   Initial Evaluation Date: 1/16/2024  Date of Surgery: None for this diagnosis   Authorized # of visits NA by 1/8/2025    Precautions/Hx: Parkinson's, thyroid disorder, HLD, hypothyroid, MVP, tremor    SUBJECTIVE:     Pt reports that she has her worst pain at night trying to get comfortable to sleep and to turn in bed.    Visit count 4/15/2024 1/8 2/8 3/8 4/8 5/8 6/8   Date 1/16/2024 1/22/2024  2/20/2024  3/5/2024  3/12/2024  3/19/2024    LBP/ R gluteals         Pain Range 2-3 to 7/10 2-3 to 7/10 2-3 to 7/10 2-3 to 5/10 2 to 6-7/10 2 to 5-6/10   Pain Ave 5/10 5/10 4/10 3-4/10 4/10 3-4/10   Pain Current --- --- --- --- 4/10 3-4/10        OBJECTIVE:     Treatment performed this date:    Therapeutic Exercise:  Visit #   4/8 5/8 6/8   Position Exercise HEP 3/5/2024  3/12/2024  3/19/2024    NuStep Seat , Arms , Resist  X 12 min, 3 intervals     Supine Sciatic nerve glide H 10      SKC  5x      DKC w towel exhale H 10      LTR H  10  X 10x    Bridging on bolster H 10x      Thoracic decompression    X     T decmprs B shldr abd    X     T decmprs B shldr flex    X     Bridging         B shldr flex T extn hands clasped H 10x      B shldr ER hands clasped behind neck H      1/2 Foam roll Balancing     X     Scap retract    X     Horiz abd w wrist extn stretch    X     B shldr abd    X     B shldr flex    X    90-90 Lumbar decompression H  X 20 min w HP     Pelvic tilt   X 10x V/T cues     Abdom set w exhale H  X 5x    Sidelying Trunk rotation book    X 3x    Trunk rotation book top leg ant H   X 10x   Sitting Leg pump nerve glide H  x 10x     Trunk flex exhale H 3x x 5x     Abdom set w exhale H  X 10x    Standing Glut set       Prone Glut set        Glut retraining knee lift        Glut retraining leg lift       Neuro Re-ed  X see assessment  X see assessment     Ther Act Function  X sit to stand X  gardening    H = HEP. Pt given copies of this exercise for home program.  X = Exercises done this date - pt verbalized understanding and demonstrated competence. All exercises done B unless otherwise indicated.   Pt advised to discontinue exercises that increase pain and to call or return to therapist to discuss.  Each intervention above is specifically prescribed to address the patient's identified impairments, activity limitations, and participation restrictions.    ASSESSMENT:     Pt making progress with decreased ave pain.  Pt notes difficulty w comfort and mobility in bed.  Discussed options for mattress as she notes hers is older.  Pt did well to progress spinal mobility as noted above.  Pt had good decrease of pain to 2/10 after tx.  Pt demonstrates all previously instructed exercises with proficiency. Pt remains cooperative and motivated, performing HEP consistently.     Physical Therapy Goals: From 1/16/2024 to 4/15/2024  - Created with patient input during initial evaluation   1. Pt will be independent in beginning level of HEP for stretching, posture and strengthening.   2. Pt will be able to don/doff socks/shoes without increased symptoms.  3. Pt will be able to stand for 1 hour for work without increased symptoms.  4. Pt will be able to rise from the floor without increased symptoms.  5. Pt will be able to turn and scoot in bed without increased symptoms.     PLAN OF CARE:      Assess response to rotation progression.  Continue PT per original plan for therapeutic exercises, posture retraining, therapeutic activities, manual treatment, neuromuscular reeducation, therapeutic pain neuroscience education, patient education, self care home management, home exercise program, and modalities as needed.    Charged Units Units Minutes    Ther Ex 2 23   Neuro 1 12   Ther Activity 1 10   Gait     Man Tx          Total Timed  45   Total Tx Time  45            __________________________________________________________________________________________      21st Century Cures Act Notice to Patient: Medical documents like this are made available to patients in the interest of transparency. However, be advised this is a medical document and it is intended as lptx-so-xghp communication between your medical providers. This medical document may contain abbreviations, assessments, medical data, and results or other terms that are unfamiliar. Medical documents are intended to carry relevant information, facts as evident, and the clinical opinion of the practitioner. As such, this medical document may be written in language that appears blunt or direct. You are encouraged to contact your medical provider and/or [facility] Patient Experience if you have any questions about this medical document.       Objective Initial Evaluation Data 1/16/2024:    Oswestry Disability Index Score  Score: 30 % (1/15/2024  8:35 PM)    Palpation: dense T-L ps w poor mobility  Observation: L>R B constant hand resting tremors  Gait:        Deviations: trunk flex, shuffling gait       Devices: none       Assistance: none      Posture 1/16/2024   Alignment R foot ER, mild L trunk shift; L foot fwd, B shldr protraction, forward head position, L shldr depressed       Sensation 1/16/2024   Dermatomes Light Touch    Proximal medial thigh R (L2)  wnl   Proximal medial thigh L (L2) wnl   Above knee R (L3) wnl   Above knee L (L3) wnl   Medial arch R(L4) wnl   Medial arch L (L4) wnl   Between 1st - 2nd toes R (L5) wnl   Between 1st - 2nd toes L (L5) wnl   Lateral border of foot R (S1) wnl   Lateral border of foot L (S1) wnl   Popliteal fossa R (S2) wnl   Popliteal fossa L(S2) wnl       Abdominals 1/16/2024   Tone  Poor w breath holding pattern of stability     Diastasis in fingers 1/16/2024   12 cm above umbilicus 1-2   6 cm above umbilicus 1-2   At umbilicus 2   6 cm below umbilicus 1-2   12 cm  above umbilicus 1-2       ROM Lumbar 1/16/2024   Flexion Mod*   Extension Mod poor in L4-S1   R SB mod   L SB Min-mod   R Rotation mod   L Rotation Min-mod   * pain limiting    ROM Hip 1/16/2024   Flex R 125 125   Flex L 125 126   ER R 45 60   ER L 45 53   IR R 40 25   IR L 40 40   *pain limiting     MMT 5/5 1/16/2024   L2- hip flex R 5   Hip flex L 5-   L3- knee ext R 5   Knee ext L 5-   L4- ankle DF R 5   Ankle DF L 5   L5- EHL R 5   EHL L 5   S1- ankle PF R 3-   Ankle PF L 3-   S2- Hams R 5   Hams L 5        1/22/2024    Hip abd R 4   Hip abd L 4   Hip extn R 3+   Hip extn L 3+   *pain limiting    LE flexibility 1/16/2024   Piriformis R wnl   Piriformis L wnl   Hams R -31   Hams L -20   *pain limiting    Neural mobility 1/16/2024   SLR R (+) 75 deg   SLR L wnl      BUNNY 1/16/2024   R wnl   L Mod ROM limit        Imaging:     PROCEDURE: XR LUMBAR SPINE (MIN 2 VIEWS) (CPT=72100)     COMPARISON: None.     INDICATIONS: Worsening chronic lower back pain.     TECHNIQUE: Lumbar spine radiographs (2-3 views)       FINDINGS:     ALIGNMENT: Slight levoscoliosis.  VERTEBRAL BODIES:   No acute fracture or malalignment.  There is multilevel degenerative change including facet hypertrophy and mild marginal osteophyte formation.  DISC SPACES: Severe L5-S1 disc space narrowing.  SACROILIAC JOINTS: Intact.  OTHER: Negative.                 Impression   CONCLUSION:     Slight lumbar levoscoliosis with multilevel degenerative change most significant at L5-S1.           Dictated by (CST): Christian Dietrich MD on 1/03/2023 at 10:11 AM      Finalized by (CST): Christian Dietrich MD on 1/03/2023 at 10:12 AM

## 2024-03-26 ENCOUNTER — OFFICE VISIT (OUTPATIENT)
Dept: PHYSICAL THERAPY | Facility: HOSPITAL | Age: 64
End: 2024-03-26
Attending: PHYSICAL MEDICINE & REHABILITATION
Payer: COMMERCIAL

## 2024-03-26 PROCEDURE — 97530 THERAPEUTIC ACTIVITIES: CPT

## 2024-03-26 PROCEDURE — 97112 NEUROMUSCULAR REEDUCATION: CPT

## 2024-03-26 PROCEDURE — 97110 THERAPEUTIC EXERCISES: CPT

## 2024-03-26 NOTE — PROGRESS NOTES
Reba SCOTT Price    7/20/1960  Referring Physician:  Marquise Aponte  Diagnosis: Bilateral lumbar radiculopathy (M54.16)   Initial Evaluation Date: 1/16/2024  Date of Surgery: None for this diagnosis   Authorized # of visits NA by 1/8/2025    Precautions/Hx: Parkinson's, thyroid disorder, HLD, hypothyroid, MVP, tremor    SUBJECTIVE:     Pt reports that she is doing better.  She has been able to do some spring cleaning. She continues to have her worst pain in the middle of the night and finds it very difficult to move in bed.   Visit count 4/15/2024 1/8 2/8 3/8 4/8 5/8 6/8 7/8   Date 1/16/2024 1/22/2024  2/20/2024  3/5/2024  3/12/2024  3/19/2024  3/26/2024    LBP/ R gluteals          Pain Range 2-3 to 7/10 2-3 to 7/10 2-3 to 7/10 2-3 to 5/10 2 to 6-7/10 2 to 5-6/10 2 to 5-6/10   Pain Ave 5/10 5/10 4/10 3-4/10 4/10 3-4/10 3-4/10   Pain Current --- --- --- --- 4/10 3-4/10 3-4/10        OBJECTIVE:     Treatment performed this date:    Therapeutic Exercise:  Visit #   5/8 6/8 7/8   Position Exercise HEP 3/12/2024  3/19/2024  3/26/2024    NuStep Seat , Arms , Resist    X 12.5 min 4 intervals   Supine Sciatic nerve glide H       [facility]       L[facility]   X 10x     Bridging on bolster H   10x    Thoracic decompression   X      T decmprs B shldr abd   X      T decmprs B shldr flex   X      Bridging     10x    B shldr flex T extn hands clasped H       B shldr ER hands clasped behind neck H      1/2 Foam roll Balancing    X      Scap retract   X      Horiz abd w wrist extn stretch   X      B shldr abd   X      B shldr flex   X     90-90 Lumbar decompression H X 20 min w HP      Pelvic tilt  X 10x V/T cues      Abdom set w exhale H X 5x     Sidelying Trunk rotation book   X 3x     Trunk rotation book top leg ant H  X 10x    Sitting Leg pump nerve glide H x 10x      Trunk flex exhale H x 5x      Abdom set w exhale H X 10x      Trunk extn chair H   X 3x15s    Standing Glut set        Trunk extn wall H   X  10x    Corner pect stretch    X 3x    Doorway pect stretch H   X 3x   Prone Glut set        Glut retraining knee lift        Glut retraining leg lift        Lying     X 1 min    On elbows    X 2x1 min    Press ups    X 10x   Neuro Re-ed  X see assessment   X see assessment    Ther Act Function  X gardening  X bed mobility   H = HEP. Pt given copies of this exercise for home program.  X = Exercises done this date - pt verbalized understanding and demonstrated competence. All exercises done B unless otherwise indicated.   Pt advised to discontinue exercises that increase pain and to call or return to therapist to discuss.  Each intervention above is specifically prescribed to address the patient's identified impairments, activity limitations, and participation restrictions.    ASSESSMENT:     Pt doing well w continued decreased overall pain levels.  Pt educated on flexion dominance of Parkinson's and benefits of extension mobilization.  Pt did well to progress her HEP to include thoracic and lumbar extension self mobilization.  Discussed use of specialized sheets that allow decreased friction for mobility.      Physical Therapy Goals: From 1/16/2024 to 4/15/2024  - Created with patient input during initial evaluation   1. Pt will be independent in beginning level of HEP for stretching, posture and strengthening.   2. Pt will be able to don/doff socks/shoes without increased symptoms.  3. Pt will be able to stand for 1 hour for work without increased symptoms.  4. Pt will be able to rise from the floor without increased symptoms.  5. Pt will be able to turn and scoot in bed without increased symptoms.     PLAN OF CARE:      Assess response to extension progression.  Continue PT per original plan for therapeutic exercises, posture retraining, therapeutic activities, manual treatment, neuromuscular reeducation, therapeutic pain neuroscience education, patient education, self care home management, home exercise program,  and modalities as needed.    Charged Units Units Minutes    Ther Ex 2 23   Neuro 1 14   Ther Activity 1 8   Gait     Man Tx          Total Timed  45   Total Tx Time  45           __________________________________________________________________________________________      21st Century Cures Act Notice to Patient: Medical documents like this are made available to patients in the interest of transparency. However, be advised this is a medical document and it is intended as [id number]-mu-[id number] communication between your medical providers. This medical document may contain abbreviations, assessments, medical data, and results or other terms that are unfamiliar. Medical documents are intended to carry relevant information, facts as evident, and the clinical opinion of the practitioner. As such, this medical document may be written in language that appears blunt or direct. You are encouraged to contact your medical provider and/or [facility] Patient Experience if you have any questions about this medical document.       Objective Initial Evaluation Data 1/16/2024:    Oswestry Disability Index Score  Score: 30 % (1/15/2024  8:35 PM)    Palpation: dense T-L ps w poor mobility  Observation: L>R B constant hand resting tremors  Gait:        Deviations: trunk flex, shuffling gait       Devices: none       Assistance: none      Posture 1/16/2024   Alignment R foot ER, mild L trunk shift; L foot fwd, B shldr protraction, forward head position, L shldr depressed       Sensation 1/16/2024   Dermatomes Light Touch    Proximal medial thigh R (L2)  wnl   Proximal medial thigh L (L2) wnl   Above knee R (L3) wnl   Above knee L (L3) wnl   Medial arch R(L4) wnl   Medial arch L (L4) wnl   Between 1st - 2nd toes R (L5) wnl   Between 1st - 2nd toes L (L5) wnl   Lateral border of foot R (S1) wnl   Lateral border of foot L (S1) wnl   Popliteal fossa R (S2) wnl   Popliteal fossa L(S2) wnl       Abdominals 1/16/2024   Tone  Poor w breath  holding pattern of stability     Diastasis in fingers 1/16/2024   12 cm above umbilicus 1-2   6 cm above umbilicus 1-2   At umbilicus 2   6 cm below umbilicus 1-2   12 cm above umbilicus 1-2       ROM Lumbar 1/16/2024   Flexion Mod*   Extension Mod poor in L4-S1   R SB mod   L SB Min-mod   R Rotation mod   L Rotation Min-mod   * pain limiting    ROM Hip 1/16/2024   Flex R 125 125   Flex L 125 126   ER R 45 60   ER L 45 53   IR R 40 25   IR L 40 40   *pain limiting     MMT 5/5 1/16/2024   L2- hip flex R 5   Hip flex L 5-   L3- knee ext R 5   Knee ext L 5-   L4- ankle DF R 5   Ankle DF L 5   L5- EHL R 5   EHL L 5   S1- ankle PF R 3-   Ankle PF L 3-   S2- Hams R 5   Hams L 5        1/22/2024    Hip abd R 4   Hip abd L 4   Hip extn R 3+   Hip extn L 3+   *pain limiting    LE flexibility 1/16/2024   Piriformis R wnl   Piriformis L wnl   Hams R -31   Hams L -20   *pain limiting    Neural mobility 1/16/2024   SLR R (+) 75 deg   SLR L wnl      BUNNY 1/16/2024   R wnl   L Mod ROM limit        Imaging:     PROCEDURE: XR LUMBAR SPINE (MIN 2 VIEWS) (CPT=72100)     COMPARISON: None.     INDICATIONS: Worsening chronic lower back pain.     TECHNIQUE: Lumbar spine radiographs (2-3 views)       FINDINGS:     ALIGNMENT: Slight levoscoliosis.  VERTEBRAL BODIES:   No acute fracture or malalignment.  There is multilevel degenerative change including facet hypertrophy and mild marginal osteophyte formation.  DISC SPACES: Severe L5-S1 disc space narrowing.  SACROILIAC JOINTS: Intact.  OTHER: Negative.                 Impression   CONCLUSION:     Slight lumbar levoscoliosis with multilevel degenerative change most significant at L5-S1.           Dictated by (CST): Christian Dietrich MD on 1/03/2023 at 10:11 AM      Finalized by (CST): Christian Dietrich MD on 1/03/2023 at 10:12 AM

## 2024-04-02 ENCOUNTER — OFFICE VISIT (OUTPATIENT)
Dept: PHYSICAL THERAPY | Facility: HOSPITAL | Age: 64
End: 2024-04-02
Attending: PHYSICAL MEDICINE & REHABILITATION
Payer: COMMERCIAL

## 2024-04-02 PROCEDURE — 97112 NEUROMUSCULAR REEDUCATION: CPT

## 2024-04-02 PROCEDURE — 97530 THERAPEUTIC ACTIVITIES: CPT

## 2024-04-02 PROCEDURE — 97110 THERAPEUTIC EXERCISES: CPT

## 2024-04-02 NOTE — PROGRESS NOTES
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  She is able to loosen up with doing aerobic exercise.    Visit count 4/15/2024 1/8 2/8 3/8 4/8 5/8 6/8 7/8 8/10   Date 1/16/2024 1/22/2024  2/20/2024  3/5/2024  3/12/2024  3/19/2024  3/26/2024  4/2/2024    LBP/ R gluteals           Pain Range 2-3 to 7/10 2-3 to 7/10 2-3 to 7/10 2-3 to 5/10 2 to 6-7/10 2 to 5-6/10 2 to 5-6/10 2-6/10   Pain Ave 5/10 5/10 4/10 3-4/10 4/10 3-4/10 3-4/10 3/10   Pain Current --- --- --- --- 4/10 3-4/10 3-4/10 5/10        OBJECTIVE:     Treatment performed this date:    Therapeutic Exercise:  Visit #   6/8 7/8 8/10   Position Exercise HEP 3/19/2024  3/26/2024  4/2/2024    NuStep Seat , Arms , Resist   X 12.5 min 4 intervals X 16 min, 7 intervals   Supine Sciatic nerve glide H       SKC        C w towel exhale H       LTR H  X 10x      Bridging on bolster H  10x     Thoracic decompression  X       T decmprs B shldr abd  X       T decmprs B shldr flex  X       Bridging    10x     B shldr flex T extn hands clasped H       B shldr ER hands clasped behind neck H      1/2 Foam roll Balancing   X       Scap retract  X       Horiz abd w wrist extn stretch  X       B shldr abd  X       B shldr flex  X      90-90 Lumbar decompression H       Pelvic tilt        Abdom set w exhale H      Sidelying Trunk rotation book  X 3x      Trunk rotation book top leg ant H X 10x     Sitting Leg pump nerve glide H       Trunk flex exhale H       Abdom set w exhale H       Trunk extn chair H  X 3x15s      Thoracic rotation chair H   X 5x    T rotation quick w mild thrust H    X 4x    Functional squats H   X 23\" 3x, 22\" 3x,    Standing Glut set        Trunk extn wall H  X 10x     Corner pect stretch   X 3x     Doorway pect stretch H  X 3x    Prone Glut set    X 5x    Glut retraining knee lift    X 3x    Glut retraining leg lift    X 3x    Lying    X 1 min X 1 min    On elbows   X 2x1 min X 4x 1 min exhale sags    Press ups   X 10x X 10x    Knee bends    X 5x   Neuro Re-ed   X see assessment  X see assessment    Ther Act Function   X bed mobility X sit<>stand   H = HEP. Pt given copies of this exercise for home program.  X = Exercises done this date - pt verbalized understanding and demonstrated competence. All exercises done B unless otherwise indicated.   Pt advised to discontinue exercises that increase pain and to call or return to therapist to discuss.  Each intervention above is specifically prescribed to address the patient's identified impairments, activity limitations, and participation restrictions.    ASSESSMENT:     Pt continues to have flare ups of pain at night.  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  She notes that she is doing well to decrease her average pain levels with her HEP.  Pt demonstrates decreased trunk rotation in gait and did well with addition of thoracic rotation exercises.  Pt displays good improvement in trunk extn in prone.  Pt educated on benefits of positional changes every 30 min, even just to stand briefly or perform a small mobility exercise.  Pt did well to progress good sit to stand control without use of hands w progressive lowered seat to normal chair ht. Pt will continue to benefit from PT intervention to achieve goals.        Physical Therapy Goals: From 1/16/2024 to 4/15/2024  - Created with patient input during initial evaluation   1. Pt will be independent in beginning level of HEP for stretching, posture and strengthening.   2. Pt will be able to don/doff socks/shoes without increased symptoms.  3. Pt will be able to stand  for 1 hour for work without increased symptoms.  4. Pt will be able to rise from the floor without increased symptoms.  5. Pt will be able to turn and scoot in bed without increased symptoms.     PLAN OF CARE:      Assess response to transfer control.  Continue PT per original plan for therapeutic exercises, posture retraining, therapeutic activities, manual treatment, neuromuscular reeducation, therapeutic pain neuroscience education, patient education, self care home management, home exercise program, and modalities as needed.    Charged Units Units Minutes    Ther Ex 2 23   Neuro 1 10   Ther Activity 1 12   Gait     Man Tx          Total Timed  45   Total Tx Time  45           __________________________________________________________________________________________      21st Century Cures Act Notice to Patient: Medical documents like this are made available to patients in the interest of transparency. However, be advised this is a medical document and it is intended as xhit-ep-[id number] communication between your medical providers. This medical document may contain abbreviations, assessments, medical data, and results or other terms that are unfamiliar. Medical documents are intended to carry relevant information, facts as evident, and the clinical opinion of the practitioner. As such, this medical document may be written in language that appears blunt or direct. You are encouraged to contact your medical provider and/or [facility] Patient Experience if you have any questions about this medical document.       Objective Initial Evaluation Data 1/16/2024:    Oswestry Disability Index Score  Score: 30 % (1/15/2024  8:35 PM)    Palpation: dense T-L ps w poor mobility  Observation: L>R B constant hand resting tremors  Gait:        Deviations: trunk flex, shuffling gait       Devices: none       Assistance: none      Posture 1/16/2024   Alignment R foot ER, mild L trunk shift; L foot fwd, B shldr protraction,  forward head position, L shldr depressed       Sensation 1/16/2024   Dermatomes Light Touch    Proximal medial thigh R (L2)  wnl   Proximal medial thigh L (L2) wnl   Above knee R (L3) wnl   Above knee L (L3) wnl   Medial arch R(L4) wnl   Medial arch L (L4) wnl   Between 1st - 2nd toes R (L5) wnl   Between 1st - 2nd toes L (L5) wnl   Lateral border of foot R (S1) wnl   Lateral border of foot L (S1) wnl   Popliteal fossa R (S2) wnl   Popliteal fossa L(S2) wnl       Abdominals 1/16/2024   Tone  Poor w breath holding pattern of stability     Diastasis in fingers 1/16/2024   12 cm above umbilicus 1-2   6 cm above umbilicus 1-2   At umbilicus 2   6 cm below umbilicus 1-2   12 cm above umbilicus 1-2       ROM Lumbar 1/16/2024   Flexion Mod*   Extension Mod poor in L4-S1   R SB mod   L SB Min-mod   R Rotation mod   L Rotation Min-mod   * pain limiting    ROM Hip 1/16/2024   Flex R 125 125   Flex L 125 126   ER R 45 60   ER L 45 53   IR R 40 25   IR L 40 40   *pain limiting     MMT 5/5 1/16/2024   L2- hip flex R 5   Hip flex L 5-   L3- knee ext R 5   Knee ext L 5-   L4- ankle DF R 5   Ankle DF L 5   L5- EHL R 5   EHL L 5   S1- ankle PF R 3-   Ankle PF L 3-   S2- Hams R 5   Hams L 5        1/22/2024    Hip abd R 4   Hip abd L 4   Hip extn R 3+   Hip extn L 3+   *pain limiting    LE flexibility 1/16/2024   Piriformis R wnl   Piriformis L wnl   Hams R -31   Hams L -20   *pain limiting    Neural mobility 1/16/2024   SLR R (+) 75 deg   SLR L wnl      BUNNY 1/16/2024   R wnl   L Mod ROM limit        Imaging:     PROCEDURE: XR LUMBAR SPINE (MIN 2 VIEWS) (CPT=72100)     COMPARISON: None.     INDICATIONS: Worsening chronic lower back pain.     TECHNIQUE: Lumbar spine radiographs (2-3 views)       FINDINGS:     ALIGNMENT: Slight levoscoliosis.  VERTEBRAL BODIES:   No acute fracture or malalignment.  There is multilevel degenerative change including facet hypertrophy and mild marginal osteophyte formation.  DISC SPACES: Severe L5-S1  disc space narrowing.  SACROILIAC JOINTS: Intact.  OTHER: Negative.                 Impression   CONCLUSION:     Slight lumbar levoscoliosis with multilevel degenerative change most significant at L5-S1.           Dictated by (CST): Christian Dietrich MD on 1/03/2023 at 10:11 AM      Finalized by (CST): Christian Dietrich MD on 1/03/2023 at 10:12 AM

## 2024-04-09 ENCOUNTER — OFFICE VISIT (OUTPATIENT)
Dept: PHYSICAL THERAPY | Facility: HOSPITAL | Age: 64
End: 2024-04-09
Attending: PHYSICAL MEDICINE & REHABILITATION
Payer: COMMERCIAL

## 2024-04-09 PROCEDURE — 97110 THERAPEUTIC EXERCISES: CPT

## 2024-04-09 PROCEDURE — 97530 THERAPEUTIC ACTIVITIES: CPT

## 2024-04-09 PROCEDURE — 97112 NEUROMUSCULAR REEDUCATION: CPT

## 2024-04-09 NOTE — PROGRESS NOTES
Discharge Summary    Pt has attended 9, cancelled 0, and no shown 0 visits in Physical Therapy.     Reba Price    7/20/1960  Referring Physician:  Marquise Aponte  Diagnosis: Bilateral lumbar radiculopathy (M54.16)   Initial Evaluation Date: 1/16/2024  Date of Surgery: None for this diagnosis   Authorized # of visits NA by 1/8/2025    Precautions/Hx: Parkinson's, thyroid disorder, HLD, hypothyroid, MVP, tremor    SUBJECTIVE:     Pt reports that she is doing well today with low level of pain.  She states that she tripped over a grandchild and fell to the ground, but was unharmed at the time.  She had increased soreness yesterday.   Visit count 4/15/2024 1/8 6/8 7/8 9/10   Date 1/16/2024 3/19/2024  3/26/2024  4/9/2024    LBP/ R gluteals       Pain Range 2-3 to 7/10 2 to 5-6/10 2 to 5-6/10 2-6/10   Pain Ave 5/10 3-4/10 3-4/10 4/10   Pain Current --- 3-4/10 3-4/10 2/10     Pt has completed skilled physical therapy intervention with good decrease of pain complaints. Pt displays improved: lumbar ROM; B hip ROM; B LE strength.    Pt remains limited by muscle tension/tone associated w her Parkinson's ex.  She remains weak in B ankle PF and demonstrates shuffling gait w decreased push off.  She has been motivated and consistent w her HEP.  Pt may benefit from return to PT for HEP upgrade in the future.      Pt demonstrates improved function as noted as progression towards goals and improved functional score. See objective discharge data below in tables with initial evaluation data. Pt has met goals, is independent with HEP and ready to be discharged to [facility].     Post Oswestry Disability Index Score  Post Score: 22 % (4/14/2024 11:56 PM)    8 % improvement       OBJECTIVE:     Treatment performed this date:    Therapeutic Exercise:  Visit #   7/8 8/10 9/10   Position Exercise HEP 3/26/2024  4/2/2024  4/9/2024    NuStep Seat , Arms , Resist  X 12.5 min 4 intervals X 16 min, 7 intervals X 16 min, 7 intervals   Supine  Sciatic nerve glide H       [facility]        DKC w towel exhale H       LTR H        Bridging on bolster H 10x  X 5x    Thoracic decompression        T decmprs B shldr abd        T decmprs B shldr flex        Bridging  H  10x  X 10x    Bridging toes up H    X 10x    Unilat bridging cross leg H    X 5x    B shldr flex T extn hands clasped H       B shldr ER hands clasped behind neck H      1/2 Foam roll Balancing         Scap retract        Horiz abd w wrist extn stretch        B shldr abd        B shldr flex       90-90 Lumbar decompression H       Pelvic tilt        Abdom set w exhale H      Sidelying Trunk rotation book        Trunk rotation book top leg ant H      Sitting Leg pump nerve glide H       Trunk flex exhale H       Abdom set w exhale H       Trunk extn chair H X 3x15s       Thoracic rotation chair H  X 5x     T rotation quick w mild thrust H  X 4x     Functional squats H  X 23\" 3x, 22\" 3x,     Standing Glut set        Trunk extn wall H X 10x  10x    Corner pect stretch  X 3x      Doorway pect stretch H X 3x  2x    Heel lifts H   X 10x    Hip abd H   X 10x   Prone Glut set   X 5x     Glut retraining knee lift   X 3x     Glut retraining leg lift   X 3x     Lying   X 1 min X 1 min     On elbows  X 2x1 min X 4x 1 min exhale sags     Press ups  X 10x X 10x     Knee bends   X 5x    Many ROM and strength for assessment    X    Neuro Re-ed  X see assessment  X see assessment  X review of HEP, cueing, future episodes of care for upgrade   Ther Act Function  X bed mobility X sit<>stand X turning in gait   H = HEP. Pt given copies of this exercise for home program.  X = Exercises done this date - pt verbalized understanding and demonstrated competence. All exercises done B unless otherwise indicated.   Pt advised to discontinue exercises that increase pain and to call or return to therapist to discuss.  Each intervention above is specifically prescribed to address the patient's identified impairments, activity  limitations, and participation restrictions.    ASSESSMENT:          Physical Therapy Goals: From 1/16/2024 to 4/15/2024  - Created with patient input during initial evaluation   1. Pt will be independent in beginning level of HEP for stretching, posture and strengthening.   2. Pt will be able to don/doff socks/shoes without increased symptoms.  3. Pt will be able to stand for 1 hour for work without increased symptoms.  4. Pt will be able to rise from the floor without increased symptoms.  5. Pt will be able to turn and scoot in bed without increased symptoms.     PLAN OF CARE:      Plan:  Discharge to [facility]    Thank you for your referral. If you have any questions, please contact me at Dept: [phone].    Sincerely,  Electronically signed by therapist: More Laughlin PT        Charged Units Units Minutes    Ther Ex 2 35   Neuro 1 15   Ther Activity 1 10   Gait     Man Tx          Total Timed  60   Total Tx Time  60           __________________________________________________________________________________________      21st Century Cures Act Notice to Patient: Medical documents like this are made available to patients in the interest of transparency. However, be advised this is a medical document and it is intended as vyoh-je-oqck communication between your medical providers. This medical document may contain abbreviations, assessments, medical data, and results or other terms that are unfamiliar. Medical documents are intended to carry relevant information, facts as evident, and the clinical opinion of the practitioner. As such, this medical document may be written in language that appears blunt or direct. You are encouraged to contact your medical provider and/or [facility] Patient Experience if you have any questions about this medical document.       Objective Initial Evaluation Data 1/16/2024:    Oswestry Disability Index Score  Score: 30 % (1/15/2024  8:35 PM)    Palpation: dense T-L ps w poor  mobility  Observation: L>R B constant hand resting tremors  Gait:        Deviations: trunk flex, shuffling gait       Devices: none       Assistance: none      Posture 1/16/2024   Alignment R foot ER, mild L trunk shift; L foot fwd, B shldr protraction, forward head position, L shldr depressed       Sensation 1/16/2024   Dermatomes Light Touch    Proximal medial thigh R (L2)  wnl   Proximal medial thigh L (L2) wnl   Above knee R (L3) wnl   Above knee L (L3) wnl   Medial arch R(L4) wnl   Medial arch L (L4) wnl   Between 1st - 2nd toes R (L5) wnl   Between 1st - 2nd toes L (L5) wnl   Lateral border of foot R (S1) wnl   Lateral border of foot L (S1) wnl   Popliteal fossa R (S2) wnl   Popliteal fossa L(S2) wnl       Abdominals 1/16/2024   Tone  Poor w breath holding pattern of stability     Diastasis in fingers 1/16/2024   12 cm above umbilicus 1-2   6 cm above umbilicus 1-2   At umbilicus 2   6 cm below umbilicus 1-2   12 cm above umbilicus 1-2       ROM Lumbar 1/16/2024 4/9/2024    Flexion Mod* Min    Extension Mod poor in L4-S1 Mod    R SB mod Min-mod   L SB Min-mod Min(-)   R Rotation mod min   L Rotation Min-mod min   * pain limiting    ROM Hip 1/16/2024 4/9/2024    Flex R 125 125 125   Flex L 125 126 125   ER R 45 60 55   ER L 45 53 51   IR R 40 25 30   IR L 40 40 40   *pain limiting     MMT 5/5 1/16/2024 4/9/2024    L2- hip flex R 5 5   Hip flex L 5- 5   L3- knee ext R 5 5   Knee ext L 5- 5   L4- ankle DF R 5 5   Ankle DF L 5 5   L5- EHL R 5 5   EHL L 5 5   S1- ankle PF R 3- 3-   Ankle PF L 3- 3-   S2- Hams R 5 5   Hams L 5 5         1/22/2024     Hip abd R 4 5-   Hip abd L 4 4+   Hip extn R 3+ 4+   Hip extn L 3+ 4+   *pain limiting    LE flexibility 1/16/2024   Piriformis R wnl   Piriformis L wnl   Hams R -31   Hams L -20   *pain limiting    Neural mobility 1/16/2024   SLR R (+) 75 deg   SLR L wnl      BUNNY 1/16/2024   R wnl   L Mod ROM limit        Imaging:     PROCEDURE: XR LUMBAR SPINE (MIN 2 VIEWS)  (CPT=72100)     COMPARISON: None.     INDICATIONS: Worsening chronic lower back pain.     TECHNIQUE: Lumbar spine radiographs (2-3 views)       FINDINGS:     ALIGNMENT: Slight levoscoliosis.  VERTEBRAL BODIES:   No acute fracture or malalignment.  There is multilevel degenerative change including facet hypertrophy and mild marginal osteophyte formation.  DISC SPACES: Severe L5-S1 disc space narrowing.  SACROILIAC JOINTS: Intact.  OTHER: Negative.                 Impression   CONCLUSION:     Slight lumbar levoscoliosis with multilevel degenerative change most significant at L5-S1.           Dictated by (CST): Christian Dietrich MD on 1/03/2023 at 10:11 AM      Finalized by (CST): Christian Dietrich MD on 1/03/2023 at 10:12 AM

## 2024-04-15 RX ORDER — ROSUVASTATIN CALCIUM 5 MG/1
5 TABLET, COATED ORAL NIGHTLY
Qty: 90 TABLET | Refills: 3 | Status: SHIPPED | OUTPATIENT
Start: 2024-04-15

## 2024-04-15 NOTE — TELEPHONE ENCOUNTER
REFILL PASSED PER [facility] PROTOCOLS    Requested Prescriptions   Pending Prescriptions Disp Refills    ROSUVASTATIN 5 MG Oral Tab [Pharmacy Med Name: Rosuvastatin Calcium 5 Mg Tab Nort] 90 tablet 0     Sig: Take 1 tablet (5 mg total) by mouth nightly.       Cholesterol Medication Protocol Passed - 4/13/2024  1:30 AM        Passed - ALT < 80     Lab Results   Component Value Date    ALT 22 04/29/2023             Passed - ALT resulted within past year        Passed - Lipid panel within past 12 months     Lab Results   Component Value Date    CHOLEST 190 04/29/2023    TRIG 107 04/29/2023    HDL 58 04/29/2023     (H) 04/29/2023    VLDL 18 04/29/2023    NONHDLC 132 (H) 04/29/2023             Passed - In person appointment or virtual visit in the past 12 mos or appointment in next 3 mos     Recent Outpatient Visits              6 days ago     [facility] More Laughlin, PT    Office Visit    1 week ago     [facility] More Laughlin, PT    Office Visit    2 weeks ago     [facility] More Laughlin, PT    Office Visit    3 weeks ago     [facility] More Laughlin, PT    Office Visit    1 month ago     [facility] More Laughlin, PT    Office Visit          Future Appointments         Provider Department Appt Notes    In 2 days Marquise Aponte DO [facility] Return in about 6 weeks                         Future Appointments         Provider Department Appt Notes    In 2 days Marquise Aponte,  [facility] Return in about 6 weeks          Recent Outpatient Visits              6 days ago     [facility] More Laughlin, PT    Office Visit    1 week ago     [facility] More Laughlin, PT    Office Visit    2 weeks ago     [facility] More Laughlin, PT    Office Visit    3  weeks ago     [facility] More Laughlin, PT    Office Visit    1 month ago     [facility] More Laughlin, PT    Office Visit

## 2024-04-17 ENCOUNTER — OFFICE VISIT (OUTPATIENT)
Dept: PHYSICAL MEDICINE AND REHAB | Facility: CLINIC | Age: 64
End: 2024-04-17
Payer: COMMERCIAL

## 2024-04-17 VITALS — HEIGHT: 64 IN | WEIGHT: 197 LBS | RESPIRATION RATE: 18 BRPM | BODY MASS INDEX: 33.63 KG/M2

## 2024-04-17 DIAGNOSIS — M48.062 SPINAL STENOSIS OF LUMBAR REGION WITH NEUROGENIC CLAUDICATION: Primary | ICD-10-CM

## 2024-04-17 DIAGNOSIS — K21.9 GASTROESOPHAGEAL REFLUX DISEASE WITHOUT ESOPHAGITIS: ICD-10-CM

## 2024-04-17 DIAGNOSIS — G20.A1 PARKINSON'S DISEASE, UNSPECIFIED WHETHER DYSKINESIA PRESENT, UNSPECIFIED WHETHER MANIFESTATIONS FLUCTUATE (HCC): ICD-10-CM

## 2024-04-17 DIAGNOSIS — M54.16 BILATERAL LUMBAR RADICULOPATHY: ICD-10-CM

## 2024-04-17 DIAGNOSIS — E66.9 OBESITY (BMI 30-39.9): ICD-10-CM

## 2024-04-17 PROCEDURE — 3008F BODY MASS INDEX DOCD: CPT | Performed by: PHYSICAL MEDICINE & REHABILITATION

## 2024-04-17 PROCEDURE — 99214 OFFICE O/P EST MOD 30 MIN: CPT | Performed by: PHYSICAL MEDICINE & REHABILITATION

## 2024-04-18 NOTE — PROGRESS NOTES
[facility]  OFFICE FOLLOW UP EVALUATION      HISTORY OF PRESENT ILLNESS:     Chief Complaint   Patient presents with    Follow - Up     Returning patient. LOV 3/6/24. Here for follow up on low back pain. Finished PT last week. Continues home exercises. Reports 50% overall improvement. Less N/T to feet and less radiation of pain down legs. CPL 2/10.  Uses advil sparingly.            Patient is following up for low back pain with radiation bilateral lower extremities.  She states the pain is worsened with increased activity and ambulation and notices improvement with sitting.  She finds herself leaning forward at a grocery store on it cart to help with her symptoms.  She reports 50% improvement since starting PT and doing home exercises.  Overall she is happy with her progress however still continues to be limited with pain.  She rates pain to be 2 out of 10.  She is taking Advil as needed.  She has not had any recent MRI imaging of the lumbar spine    PHYSICAL EXAM:   Resp 18   Ht 64\"   Wt 197 lb (89.4 kg)   LMP 12/01/2016   BMI 33.81 kg/m²     Full active range of motion of the lumbar spine. Gait is normal. Strength is 5 out of 5 in bilateral lower extremities, sensation intact to light touch in the L4-S1 dermatomes. Reflexes are +2 and symmetric. Slump test is negative. Facet loading is negative.     IMAGING:     No new imaging    All imaging results were reviewed and discussed with patient.      ASSESSMENT/PLAN:     1. Spinal stenosis of lumbar region with neurogenic claudication    2. Parkinson's disease, unspecified whether dyskinesia present, unspecified whether manifestations fluctuate (HCC)    3. Bilateral lumbar radiculopathy    4. Obesity (BMI 30-39.9)    5. Gastroesophageal reflux disease without esophagitis        Reba Price is a 63 year old female following up for back pain which I believe is secondary to spinal stenosis with neurogenic  claudication.  I recommended MRI imaging of the lumbar spine for further evaluation.  She will likely benefit from lumbar interventional lumbosacral spine epidural injection which we can discuss in further detail after imaging is completed.  She has recently started Sinemet and would like to avoid any other medication management for her pain which was discussed with her including gabapentin which she cannot tolerate in the past due to side effects and Lyrica which she would like to defer for now.  Recommend considering these neuropathic pain medications in the future.      The patient verbalized understanding with the plan and was in agreement. All questions/concerns were addressed and there were no barriers to learning.  Please note Dragon dictation software was used to dictate this note and may result in inadvertent typos.    Marquise Aponte DO, FAAPMR & CAQSM  Physical Medicine and Rehabilitation  Sports and Spine Medicine    PAST MEDICAL HISTORY:     Past Medical History:    Disorder of thyroid    Esophageal reflux    High cholesterol    Hypercholesterolemia    Hyperlipidemia    Hypothyroidism    Lump, breast    Mitral valve prolapse    Obesity (BMI 30-39.9)    Tremor    Visual impairment    Readers         PAST SURGICAL HISTORY:     Past Surgical History:   Procedure Laterality Date    Adenoidectomy      Colonoscopy      Colonoscopy N/A 2021    Procedure: COLONOSCOPY/ESOPHAGOGASTRODUODENOSCOPY (EGD);  Surgeon: Chas Moreno MD;  Location: [facility] ENDOSCOPY    Endometrial biopsy - jar(s): 2      [facility] localization wire 1 site left (cpt=[id number])       appx date    Needle biopsy left            x2    Other surgical history Left     excision fibroadenoma breast     Tonsillectomy           CURRENT MEDICATIONS:     Current Outpatient Medications   Medication Sig Dispense Refill    rosuvastatin 5 MG Oral Tab Take 1 tablet (5 mg total) by mouth nightly. 90 tablet 3    Amantadine HCl 100 MG Oral Cap  Take 1 capsule (100 mg total) by mouth 2 (two) times daily. 180 capsule 3    carbidopa-levodopa  MG Oral Tab Start with 1/2 pill TID for 1 week, then 1 pill in an and 1/2 pill at noon and pm for 1 week, then 1 pill in am and noon and 1/2 pill in pm for 1 week then 1 pill  tablet 3    levothyroxine 125 MCG Oral Tab Take 1 tablet (125 mcg total) by mouth before breakfast. 90 tablet 3    pantoprazole 40 MG Oral Tab EC Take 1 tablet (40 mg total) by mouth nightly. 90 tablet 3    magnesium 250 MG Oral Tab Take 1 tablet (250 mg total) by mouth.      OMEGA-3 FATTY ACIDS OR Take by mouth daily.      Ergocalciferol (VITAMIN D CAPS 56950 IU OR) Take 1,000 Units by mouth daily.      metoprolol succinate 50 MG Oral Tablet 24 Hr Take 1 tablet (50 mg total) by mouth daily. 30 tablet 11    loratadine 10 MG Oral Tab Take 1 tablet (10 mg total) by mouth as needed for Allergies.           ALLERGIES:   No Known Allergies      FAMILY HISTORY:     Family History   Problem Relation Age of Onset    Stroke Father     Other (Parkinson's disease) Father     Tremor Father         Had Parkinsons disease    Lipids Mother         Elevated cholesterol    Cancer Mother         cervical    Dementia Mother         dx-since 2018    Breast Cancer Paternal Grandmother 50    Cancer Paternal Grandmother     Lipids Brother     Breast Cancer Paternal Cousin Female     Cancer Paternal Grandfather         lung cancer          SOCIAL HISTORY:     Social History     Socioeconomic History    Marital status:    Tobacco Use    Smoking status: Never    Smokeless tobacco: Never    Tobacco comments:     My parents were heavy smokers   Vaping Use    Vaping status: Never Used   Substance and Sexual Activity    Alcohol use: Not Currently     Comment: rare    Drug use: No    Sexual activity: Not Currently   Other Topics Concern    Caffeine Concern Yes     Comment: soda, 8oz daily    Pt has a pacemaker No    Pt has a defibrillator No    Reaction to  local anesthetic No          REVIEW OF SYSTEMS:   A comprehensive 10 point review of systems was completed.  Pertinent positives and negatives noted in the the HPI.      LABS:     Lab Results   Component Value Date     (H) 04/29/2023    A1C 6.2 (H) 04/29/2023     Lab Results   Component Value Date    WBC 5.9 04/29/2023    RBC 4.33 04/29/2023    HGB 13.2 04/29/2023    HCT 40.6 04/29/2023    MCV 93.8 04/29/2023    MCH 30.5 04/29/2023    MCHC 32.5 04/29/2023    RDW 11.9 04/29/2023    .0 04/29/2023    MPV 7.0 (L) 01/04/2018     Lab Results   Component Value Date     (H) 04/29/2023    BUN 15 04/29/2023    BUNCREA 16.3 04/29/2023    CREATSERUM 0.92 04/29/2023    ANIONGAP 7 04/29/2023    GFRNAA 68 06/13/2022    GFRAA 79 06/13/2022    CA 9.8 04/29/2023    OSMOCALC 294 04/29/2023    ALKPHO 45 (L) 04/29/2023    AST 17 04/29/2023    ALT 22 04/29/2023    ALKPHOS 46 01/16/2016    BILT 1.0 04/29/2023    TP 7.6 04/29/2023    ALB 3.8 04/29/2023    GLOBULIN 3.8 04/29/2023    AGRATIO 1.1 01/16/2016     04/29/2023    K 4.4 04/29/2023     04/29/2023    CO2 29.0 04/29/2023     Lab Results   Component Value Date    PTP 12.7 03/05/2022    INR 0.95 03/05/2022     Lab Results   Component Value Date    VITD 30.7 04/28/2022

## 2024-05-12 DIAGNOSIS — K21.9 GASTROESOPHAGEAL REFLUX DISEASE: ICD-10-CM

## 2024-05-14 RX ORDER — PANTOPRAZOLE SODIUM 40 MG/1
40 TABLET, DELAYED RELEASE ORAL NIGHTLY
Qty: 90 TABLET | Refills: 3 | Status: SHIPPED | OUTPATIENT
Start: 2024-05-14

## 2024-05-14 NOTE — TELEPHONE ENCOUNTER
Refill passed per [facility] protocols.    Requested Prescriptions   Pending Prescriptions Disp Refills    PANTOPRAZOLE 40 MG Oral Tab EC [Pharmacy Med Name: Pantoprazole Sodium Ec 40 Mg Tab Auro] 90 tablet 0     Sig: Take 1 tablet (40 mg total) by mouth nightly.       Gastrointestional Medication Protocol Passed - 5/12/2024  9:27 AM        Passed - In person appointment or virtual visit in the past 12 mos or appointment in next 3 mos     Recent Outpatient Visits              3 weeks ago Spinal stenosis of lumbar region with neurogenic claudication    [facility], [facility], [place] Marquise Aponte, DO    Office Visit    1 month ago     [facility] More Laughlin, PT    Office Visit    1 month ago     [facility] More Laughlin, PT    Office Visit    1 month ago     [facility] More Laughlin, PT    Office Visit    1 month ago     [facility] More Laughlin, PT    Office Visit

## 2024-06-04 ENCOUNTER — OFFICE VISIT (OUTPATIENT)
Dept: NEUROLOGY | Facility: CLINIC | Age: 64
End: 2024-06-04
Payer: COMMERCIAL

## 2024-06-04 VITALS — HEIGHT: 64 IN | BODY MASS INDEX: 33.63 KG/M2 | WEIGHT: 197 LBS

## 2024-06-04 DIAGNOSIS — G20.A1 PARKINSON'S DISEASE WITHOUT DYSKINESIA OR FLUCTUATING MANIFESTATIONS (HCC): Primary | ICD-10-CM

## 2024-06-04 PROCEDURE — 3008F BODY MASS INDEX DOCD: CPT | Performed by: OTHER

## 2024-06-04 PROCEDURE — 99213 OFFICE O/P EST LOW 20 MIN: CPT | Performed by: OTHER

## 2024-06-04 RX ORDER — AMANTADINE HYDROCHLORIDE 100 MG/1
100 CAPSULE, GELATIN COATED ORAL 2 TIMES DAILY
Qty: 180 CAPSULE | Refills: 3 | Status: SHIPPED | OUTPATIENT
Start: 2024-06-04

## 2024-06-04 NOTE — PROGRESS NOTES
Neurology follow-up visit     Referred By: Dr. Roberts ref. provider found    Chief Complaint:   Chief Complaint   Patient presents with    Parkinson's     LOV 3/5/24 - Pt presents to follow up on her PD. Pt was placed on sinemet along with her amantadine and feels that the sinemet is helping, but she continues to have constipation. States that there have been times that she has missed a few doses.       HPI:     Reba Priec is a 63 year old female, who presents for hand tremors.  Physical tremor since beginning of 2022, mostly left-sided, her left arm and left foot will shake occasionally.  Her handwriting will change, posture with gait stooped and her walking became slower.  Some history of constipation for many years, she does talk during her dreams.  Her family history is important for her father with history of Parkinson's and started in his 50s, though it was assumed to be due to encephalitis and exposure to toxic fumes as a .     MRI of the brain was done.  That was not revealing.  Extensive blood work was done, seropositive, T4, methylmalonic acid, mercury, Lyme disease panel, TSH, B12 were checked and those were not revealing.    Patient also was ordered to go to [facility] therapy.    Patient came back for follow-up and January 2023, at that point she was considering medications.    Amantadine and selegiline were tried.  Unfortunately they cause side effects.    Patient came back for follow-up in May 2023, she had to stop selegiline because of significant elevation of hypertension, amantadine might have caused headaches.  She still want to move on some medication since her tremors were significantly embarrassing.    Patient came back for follow-up in August 2023.  Amantadine was helpful to some degree.  She was complaining of constipation and dry mouth she was using stool softener every day.    Patient came back for follow-up in March 2024.  More freezing episodes.  She was getting more impaired  at her work and she was worried about people noticing her symptoms.  Patient was started on carbidopa-levodopa.    On a follow-up visit in 2024 she was reporting some improvement.  Still some tremors but overall she felt satisfied with control of her symptoms.    Past Medical History:    Disorder of thyroid    Esophageal reflux    High cholesterol    Hypercholesterolemia    Hyperlipidemia    Hypothyroidism    Lump, breast    Mitral valve prolapse    Obesity (BMI 30-39.9)    Tremor    Visual impairment    Readers       Past Surgical History:   Procedure Laterality Date    Adenoidectomy      Colonoscopy      Colonoscopy N/A 2021    Procedure: COLONOSCOPY/ESOPHAGOGASTRODUODENOSCOPY (EGD);  Surgeon: Chas Moreno MD;  Location: [facility] ENDOSCOPY    Endometrial biopsy - jar(s): 2      [facility] localization wire 1 site left (cpt=[id number])       appx date    Needle biopsy left            x2    Other surgical history Left     excision fibroadenoma breast     Tonsillectomy         Social history:  History   Smoking Status    Never   Smokeless Tobacco    Never       History   Alcohol Use Not Currently     Comment: rare       History   Drug Use No         Family History   Problem Relation Age of Onset    Stroke Father     Other (Parkinson's disease) Father     Tremor Father         Had Parkinsons disease    Lipids Mother         Elevated cholesterol    Cancer Mother         cervical    Dementia Mother         dx-since 2018    Breast Cancer Paternal Grandmother 50    Cancer Paternal Grandmother     Lipids Brother     Breast Cancer Paternal Cousin Female     Cancer Paternal Grandfather         lung cancer         Current Outpatient Medications:     pantoprazole 40 MG Oral Tab EC, Take 1 tablet (40 mg total) by mouth nightly., Disp: 90 tablet, Rfl: 3    rosuvastatin 5 MG Oral Tab, Take 1 tablet (5 mg total) by mouth nightly., Disp: 90 tablet, Rfl: 3    Amantadine HCl 100 MG Oral Cap, Take 1 capsule (100 mg  total) by mouth 2 (two) times daily., Disp: 180 capsule, Rfl: 3    carbidopa-levodopa  MG Oral Tab, Start with 1/2 pill TID for 1 week, then 1 pill in an and 1/2 pill at noon and pm for 1 week, then 1 pill in am and noon and 1/2 pill in pm for 1 week then 1 pill TID, Disp: 270 tablet, Rfl: 3    levothyroxine 125 MCG Oral Tab, Take 1 tablet (125 mcg total) by mouth before breakfast., Disp: 90 tablet, Rfl: 3    magnesium 250 MG Oral Tab, Take 1 tablet (250 mg total) by mouth., Disp: , Rfl:     OMEGA-3 FATTY ACIDS OR, Take by mouth daily., Disp: , Rfl:     Ergocalciferol (VITAMIN D CAPS 24239 IU OR), Take 1,000 Units by mouth daily., Disp: , Rfl:     metoprolol succinate 50 MG Oral Tablet 24 Hr, Take 1 tablet (50 mg total) by mouth daily., Disp: 30 tablet, Rfl: 11    loratadine 10 MG Oral Tab, Take 1 tablet (10 mg total) by mouth as needed for Allergies., Disp: , Rfl:     No Known Allergies    ROS:   As in HPI, the rest of the 14 system review was done and was negative      Physical Exam:  Vitals:    06/04/24 1136   Weight: 197 lb (89.4 kg)   Height: 64\"       General: No apparent distress, well nourished, well groomed.  Head- Normocephalic, atraumatic  Eyes- No redness or swelling  ENT- Hearing intake, normal glutition  Neck- No masses or adenopathy  Cv: pulses were palpable and normal, no cyanosis or edema     Neurological:     Mental Status- Alert and oriented x3.  Normal attention span and concentration  Thought process intact  Memory intact- recent and remote  Mood intact  Fund of knowledge appropriate for education and age    Language intact including: comprehension, naming, repetition, vocabulary, however with bradyphrenia    Cranial Nerves:  II.-Visual fields full to confrontation    III, IV, VI- EOM intact, JOSH  V. Facial sensation and corneal reflexes intact  VII. Face symmetric, no facial weakness. Mild masked facies  VIII. Hearing intact to whisper, tuning fork or finger rub.  IX. Pallet elevates  symmetrically.  XI. Shoulder shrug is intact  XII. Tongue is midline    Motor Exam:  Muscle tone minimally increased on the left, cogwheel rigidity type.  Minimal bradykinesia.  Resting tremors of both arms, more so on the left side as well as in her legs.  No atrophy or fasciculations  Strength- upper extremities 5/5 proximally and distally                  - lower  extremities 5/5 proximally and distally    Sensory Exam:  Light touch sensation- intact in all 4 extremeties      No clonus  No Babinski sign    Coordination:  Finger to nose intact  Heel to shin intact  Rapid alternating movements are slower on the left    Gait:  Stooped, decreased arm swing, more so on the left, short stride length, minimal en block turning          Labs:    Lab Results   Component Value Date    TSH 2.620 04/29/2023     Lab Results   Component Value Date    HDL 58 04/29/2023     (H) 04/29/2023    TRIG 107 04/29/2023     Lab Results   Component Value Date    HGB 13.2 04/29/2023    HCT 40.6 04/29/2023    MCV 93.8 04/29/2023    WBC 5.9 04/29/2023    .0 04/29/2023      Lab Results   Component Value Date    BUN 15 04/29/2023    CA 9.8 04/29/2023    ALT 22 04/29/2023    AST 17 04/29/2023    ALKPHOS 46 01/16/2016    ALB 3.8 04/29/2023     04/29/2023    K 4.4 04/29/2023     04/29/2023    CO2 29.0 04/29/2023      I have reviewed labs.    MRI of the brain was independent reviewed, that was not revealing.    Assessment   1. Tremor of both hands    Amantadine was very tried and it was helpful in terms of tremors.    She did do LSVT therapy and it was helpful as well.  Further progression of disease.  Continue with a small dose of carbidopa-levodopa at this point, discussed importance of exercise.    2. PD (Parkinson's disease) ([facility])             Education and counseling provided to patient. Instructed patient to call my office or seek medical attention immediately if symptoms worsen.  Patient verbalized understanding of  information given. All questions were answered. All side effects of drugs were discussed.     Return to clinic in: No follow-ups on file.    Senthil Newberry MD

## 2024-07-09 ENCOUNTER — HOSPITAL ENCOUNTER (OUTPATIENT)
Dept: MRI IMAGING | Age: 64
Discharge: HOME | End: 2024-07-09
Attending: PHYSICAL MEDICINE & REHABILITATION
Payer: COMMERCIAL

## 2024-07-09 DIAGNOSIS — M48.062 SPINAL STENOSIS OF LUMBAR REGION WITH NEUROGENIC CLAUDICATION: ICD-10-CM

## 2024-07-09 PROCEDURE — 72148 MRI LUMBAR SPINE W/O DYE: CPT | Performed by: PHYSICAL MEDICINE & REHABILITATION

## 2024-07-11 ENCOUNTER — TELEMEDICINE (OUTPATIENT)
Dept: PHYSICAL MEDICINE AND REHAB | Facility: CLINIC | Age: 64
End: 2024-07-11
Payer: COMMERCIAL

## 2024-07-11 DIAGNOSIS — M47.816 LUMBAR FACET ARTHROPATHY: ICD-10-CM

## 2024-07-11 DIAGNOSIS — M48.062 SPINAL STENOSIS OF LUMBAR REGION WITH NEUROGENIC CLAUDICATION: Primary | ICD-10-CM

## 2024-07-11 PROCEDURE — 99214 OFFICE O/P EST MOD 30 MIN: CPT | Performed by: PHYSICAL MEDICINE & REHABILITATION

## 2024-07-12 ENCOUNTER — HOSPITAL ENCOUNTER (OUTPATIENT)
Dept: MAMMOGRAPHY | Age: 64
Discharge: HOME | End: 2024-07-12
Attending: INTERNAL MEDICINE
Payer: COMMERCIAL

## 2024-07-12 ENCOUNTER — HOSPITAL ENCOUNTER (OUTPATIENT)
Dept: BONE DENSITY | Age: 64
Discharge: HOME | End: 2024-07-12
Attending: INTERNAL MEDICINE
Payer: COMMERCIAL

## 2024-07-12 DIAGNOSIS — Z12.31 SCREENING MAMMOGRAM, ENCOUNTER FOR: ICD-10-CM

## 2024-07-12 DIAGNOSIS — Z13.820 OSTEOPOROSIS SCREENING: ICD-10-CM

## 2024-07-12 PROCEDURE — 77063 BREAST TOMOSYNTHESIS BI: CPT | Performed by: INTERNAL MEDICINE

## 2024-07-12 PROCEDURE — 77067 SCR MAMMO BI INCL CAD: CPT | Performed by: INTERNAL MEDICINE

## 2024-07-12 PROCEDURE — 77080 DXA BONE DENSITY AXIAL: CPT | Performed by: INTERNAL MEDICINE

## 2024-07-15 ENCOUNTER — TELEPHONE (OUTPATIENT)
Dept: PHYSICAL MEDICINE AND REHAB | Facility: CLINIC | Age: 64
End: 2024-07-15

## 2024-07-15 DIAGNOSIS — M47.816 LUMBAR FACET ARTHROPATHY: Primary | ICD-10-CM

## 2024-07-15 NOTE — TELEPHONE ENCOUNTER
Initiated Bilateral L4-5 and L5-S1 facet joint injections under local anesthesia CPT Code: 02913-62, 64494x2 & Dx: M47.816 to be done at [facility] with site Availity.   Status: No authorization is required  Ref# [id number] valid dates 7/22/24-11/30/24

## 2024-07-16 NOTE — TELEPHONE ENCOUNTER
Patient has been scheduled for Bilateral L4-5 and L5-S1 facet joint injections under local anesthesia on 8/5 at the [facility] with Dr. Aponte.   Anesthesia type:  Local  Please note: The [facility] will call the business day prior to discuss the exact time/arrival and additional instructions for your appointment.  Patient was advised that if he/she does receive the covid vaccine it needs to be at least 2 weeks before or after the injection.  Medications and allergies reviewed.  Patient informed of [facility]'s  policy:  he/she will need a  to and from procedure and must be on site for their entirety of their visit, if their ride is unable to the procedure will be cancelled.   [facility] is located in the [facility] 1st floor [address], [place] [address].   may park in the yellow/purple parking lot.  Patient verbalized understanding and agrees with plan.  Scheduled in Epic: Yes  Scheduled in Surgical Case: Yes  Follow up appointment made: NOV: Visit date not found

## 2024-07-16 NOTE — TELEPHONE ENCOUNTER
LMTCB 1st attempt- surgical case pended.  Local    Per Dr. Aponte, \"patient does not have to hold ASA or blood thinners when performing lumbar facet or medial branch blocks\"  (View under Dr. Aponte's MBB/Facet Lumbar protocol)

## 2024-07-25 NOTE — PROGRESS NOTES
[facility]    Telemedicine Visit - Follow Up Evaluation    Telehealth Verbal Consent   I conducted a telehealth visit with Reba Priec today, 07/11/24, which was completed using two-way, real-time interactive audio and video communication. This has been done in good liv to provide continuity of care in the best interest of the provider-patient relationship, due to the COVID -19 public health crisis/national emergency where restrictions of face-to-face office visits are ongoing. Every conscious effort was taken to allow for sufficient and adequate time to complete the visit.  The patient was made aware of the limitations of the telehealth visit, including treatment limitations as no physical exam could be performed.  The patient was advised to call 911 or to go to the ER in case there was an emergency.  The patient was also advised of the potential privacy & security concerns related to the telehealth platform.   The patient was made aware of where to find [facility]'s notice of privacy practices, telehealth consent form and other related consent forms and documents.  which are located on the [facility] website. The patient verbally agreed to telehealth consent form, related consents and the risks discussed.    Lastly, the patient confirmed that they were in [place].   Included in this visit, time may have been spent reviewing labs, medications, radiology tests and decision making. Appropriate medical decision-making and tests are ordered as detailed in the plan of care above.  Coding/billing information is submitted for this visit based on complexity of care and/or time spent for the visit.      HISTORY OF PRESENT ILLNESS:     Patient is following up low back pain.  She had MRI imaging of the lumbar spine which she is here to review today.  She continues to have pain localized to the axial lumbar spine.  She denies any changes in strength or any radiating pain.  Pain is  aggravated with ambulation.      IMAGING:   MRI lumbar spine completed on 7/9/2024 was personally reviewed    1. Multilevel degenerative changes of the lumbar spine as detailed. Notable levels as follows:      2. L4-L5:  There is a 0.8 cm medially directed synovial cyst that arises from the right facet joint and extends toward the right subarticular zone/lateral recess.  This results in moderate right lateral recess stenosis and effacement of the traversing   right L5 nerve root; please correlate for right L5 radiculopathy.  Additional mild bilateral neural foraminal stenosis at this level.   3. L5-S1:  Mild bilateral neural foraminal stenosis.      4. Slight levoscoliosis of the lumbar spine.  There is also trace degenerative anterolisthesis of L4 relative to L5.      5. Linear edema at the L4 and L5 pedicles bilaterally.  This finding is most commonly seen in the setting of degenerative stress response or altered biomechanics.      6. Fatty endplate degenerative changes at L5-S1.      7. Incidentally noted small 1.4 cm left ovarian/adnexal cyst.  Given small size this is most likely benign.     All imaging results were reviewed and discussed with patient.      ASSESSMENT:     1. Spinal stenosis of lumbar region with neurogenic claudication    2. Lumbar facet arthropathy          PLAN:   Reba Price is a 64 year old female following up for low back pain with lumbar facet arthropathy.  I have recommended bilateral lower lumbar facet joint injection.  I advised patient my office reach out to her once the injections approved.      Follow-up:   For injection     We discussed that a telemedicine visit is in place of an office visit; however, this limits the ability to perform a thorough physical examination which may affect objective findings related to a specific condition and can affect treatment.    The patient verbalized understanding with this plan and was in agreement.  There are no barriers to learning.   All questions were answered.  Please note Dragon dictation software was used to dictate this note which may result in inadvertent typos.    Marquise Aponte D.O. FAAPMR & CAQSM  Physical Medicine and Rehabilitation/Sports Medicine    PAST MEDICAL HISTORY:     Past Medical History:    Disorder of thyroid    Esophageal reflux    High cholesterol    Hypercholesterolemia    Hyperlipidemia    Hypothyroidism    Lump, breast    Mitral valve prolapse    Obesity (BMI 30-39.9)    Tremor    Visual impairment    Readers         PAST SURGICAL HISTORY:     Past Surgical History:   Procedure Laterality Date    Adenoidectomy      Colonoscopy      Colonoscopy N/A 2021    Procedure: COLONOSCOPY/ESOPHAGOGASTRODUODENOSCOPY (EGD);  Surgeon: Chas Moreno MD;  Location: [facility] ENDOSCOPY    Endometrial biopsy - jar(s): 2      Sarah localization wire 1 site left (cpt=19281)       appx date    Needle biopsy left            x2    Other surgical history Left     excision fibroadenoma breast     Tonsillectomy           CURRENT MEDICATIONS:     Current Outpatient Medications   Medication Sig Dispense Refill    carbidopa-levodopa  MG Oral Tab 1 pill  tablet 3    Amantadine HCl 100 MG Oral Cap Take 1 capsule (100 mg total) by mouth 2 (two) times daily. 180 capsule 3    pantoprazole 40 MG Oral Tab EC Take 1 tablet (40 mg total) by mouth nightly. 90 tablet 3    rosuvastatin 5 MG Oral Tab Take 1 tablet (5 mg total) by mouth nightly. 90 tablet 3    levothyroxine 125 MCG Oral Tab Take 1 tablet (125 mcg total) by mouth before breakfast. 90 tablet 3    magnesium 250 MG Oral Tab Take 1 tablet (250 mg total) by mouth.      OMEGA-3 FATTY ACIDS OR Take by mouth daily.      Ergocalciferol (VITAMIN D CAPS 54653 IU OR) Take 1,000 Units by mouth daily.      metoprolol succinate 50 MG Oral Tablet 24 Hr Take 1 tablet (50 mg total) by mouth daily. 30 tablet 11    loratadine 10 MG Oral Tab Take 1 tablet (10 mg total) by mouth as  needed for Allergies.           ALLERGIES:   No Known Allergies      FAMILY HISTORY:     Family History   Problem Relation Age of Onset    Lipids Mother         Elevated cholesterol    Cancer Mother         cervical    Dementia Mother         dx-since 2018    Stroke Father     Other (Parkinson's disease) Father     Tremor Father         Had Parkinsons disease    Lipids Brother     Breast Cancer Paternal Grandmother 50    Cancer Paternal Grandmother     Cancer Paternal Grandfather         lung cancer    Breast Cancer Paternal Cousin Female 50          SOCIAL HISTORY:     Social History     Socioeconomic History    Marital status:    Tobacco Use    Smoking status: Never    Smokeless tobacco: Never    Tobacco comments:     My parents were heavy smokers   Vaping Use    Vaping status: Never Used   Substance and Sexual Activity    Alcohol use: Not Currently     Comment: rare    Drug use: No    Sexual activity: Not Currently   Other Topics Concern    Caffeine Concern Yes     Comment: soda, 8oz daily    Pt has a pacemaker No    Pt has a defibrillator No    Reaction to local anesthetic No          REVIEW OF SYSTEMS:   As noted in HPI      PHYSICAL EXAM:   General: No immediate distress  Head: Normocephalic/ Atraumatic  Eyes: Extra-occular movements intact  Ears/Nose/Throat:  External appearance identifies normal appearance without obvious deformity  Cardiovascular: No cyanosis, clubbing or edema  Respiratory: Non-labored respirations  Skin: No lesions noted   Neurological: alert & oriented x 3, attentive, able to follow commands, comprehention intact, spontaneous speech intact  Psychiatric: Mood and affect appropriate  Musculoskeletal Exam:  No change since last exam        LABS:     Lab Results   Component Value Date     (H) 04/29/2023    A1C 6.2 (H) 04/29/2023     Lab Results   Component Value Date    WBC 5.9 04/29/2023    RBC 4.33 04/29/2023    HGB 13.2 04/29/2023    HCT 40.6 04/29/2023    MCV 93.8  04/29/2023    MCH 30.5 04/29/2023    MCHC 32.5 04/29/2023    RDW 11.9 04/29/2023    .0 04/29/2023    MPV 7.0 (L) 01/04/2018     Lab Results   Component Value Date     (H) 04/29/2023    BUN 15 04/29/2023    BUNCREA 16.3 04/29/2023    CREATSERUM 0.92 04/29/2023    ANIONGAP 7 04/29/2023    GFRNAA 68 06/13/2022    GFRAA 79 06/13/2022    CA 9.8 04/29/2023    OSMOCALC 294 04/29/2023    ALKPHO 45 (L) 04/29/2023    AST 17 04/29/2023    ALT 22 04/29/2023    ALKPHOS 46 01/16/2016    BILT 1.0 04/29/2023    TP 7.6 04/29/2023    ALB 3.8 04/29/2023    GLOBULIN 3.8 04/29/2023    AGRATIO 1.1 01/16/2016     04/29/2023    K 4.4 04/29/2023     04/29/2023    CO2 29.0 04/29/2023     Lab Results   Component Value Date    PTP 12.7 03/05/2022    INR 0.95 03/05/2022     Lab Results   Component Value Date    VITD 30.7 04/28/2022

## 2024-07-30 PROBLEM — R00.2 PALPITATIONS: Status: ACTIVE | Noted: 2022-07-11

## 2024-08-05 ENCOUNTER — APPOINTMENT (OUTPATIENT)
Dept: SURGERY | Facility: CLINIC | Age: 64
End: 2024-08-05
Payer: COMMERCIAL

## 2024-08-19 ENCOUNTER — OFFICE VISIT (OUTPATIENT)
Facility: CLINIC | Age: 64
End: 2024-08-19
Payer: COMMERCIAL

## 2024-08-19 ENCOUNTER — TELEMEDICINE (OUTPATIENT)
Dept: PHYSICAL MEDICINE AND REHAB | Facility: CLINIC | Age: 64
End: 2024-08-19
Payer: COMMERCIAL

## 2024-08-19 VITALS
SYSTOLIC BLOOD PRESSURE: 115 MMHG | HEART RATE: 76 BPM | BODY MASS INDEX: 33.89 KG/M2 | HEIGHT: 64 IN | WEIGHT: 198.5 LBS | DIASTOLIC BLOOD PRESSURE: 72 MMHG

## 2024-08-19 DIAGNOSIS — G20.A1 PARKINSON'S DISEASE, UNSPECIFIED WHETHER DYSKINESIA PRESENT, UNSPECIFIED WHETHER MANIFESTATIONS FLUCTUATE (HCC): ICD-10-CM

## 2024-08-19 DIAGNOSIS — M48.062 SPINAL STENOSIS OF LUMBAR REGION WITH NEUROGENIC CLAUDICATION: ICD-10-CM

## 2024-08-19 DIAGNOSIS — M47.816 LUMBAR FACET ARTHROPATHY: Primary | ICD-10-CM

## 2024-08-19 DIAGNOSIS — M54.16 BILATERAL LUMBAR RADICULOPATHY: ICD-10-CM

## 2024-08-19 DIAGNOSIS — K59.00 CONSTIPATION, UNSPECIFIED CONSTIPATION TYPE: Primary | ICD-10-CM

## 2024-08-19 DIAGNOSIS — R00.2 HEART PALPITATIONS: ICD-10-CM

## 2024-08-19 DIAGNOSIS — K21.9 GASTROESOPHAGEAL REFLUX DISEASE WITHOUT ESOPHAGITIS: ICD-10-CM

## 2024-08-19 DIAGNOSIS — E66.9 OBESITY (BMI 30-39.9): ICD-10-CM

## 2024-08-19 PROCEDURE — 3008F BODY MASS INDEX DOCD: CPT | Performed by: INTERNAL MEDICINE

## 2024-08-19 PROCEDURE — 3074F SYST BP LT 130 MM HG: CPT | Performed by: INTERNAL MEDICINE

## 2024-08-19 PROCEDURE — 99213 OFFICE O/P EST LOW 20 MIN: CPT | Performed by: INTERNAL MEDICINE

## 2024-08-19 PROCEDURE — 3078F DIAST BP <80 MM HG: CPT | Performed by: INTERNAL MEDICINE

## 2024-08-19 RX ORDER — POLYETHYLENE GLYCOL 3350 17 G/17G
17 POWDER, FOR SOLUTION ORAL DAILY
COMMUNITY

## 2024-08-19 NOTE — PROGRESS NOTES
[facility]    Telemedicine Visit - Follow Up Evaluation    Telehealth Verbal Consent   I conducted a telehealth visit with Reba Price today, 08/19/24, which was completed using two-way, real-time interactive audio and video communication. This has been done in good liv to provide continuity of care in the best interest of the provider-patient relationship, due to the COVID - public health crisis/national emergency where restrictions of face-to-face office visits are ongoing. Every conscious effort was taken to allow for sufficient and adequate time to complete the visit.  The patient was made aware of the limitations of the telehealth visit, including treatment limitations as no physical exam could be performed.  The patient was advised to call 911 or to go to the ER in case there was an emergency.  The patient was also advised of the potential privacy & security concerns related to the telehealth platform.   The patient was made aware of where to find [facility]'s notice of privacy practices, telehealth consent form and other related consent forms and documents.  which are located on the [facility] website. The patient verbally agreed to telehealth consent form, related consents and the risks discussed.    Lastly, the patient confirmed that they were in [place].   Included in this visit, time may have been spent reviewing labs, medications, radiology tests and decision making. Appropriate medical decision-making and tests are ordered as detailed in the plan of care above.  Coding/billing information is submitted for this visit based on complexity of care and/or time spent for the visit.      HISTORY OF PRESENT ILLNESS:     Patient is following up for back pain.  She has responded really well to the lumbar facet joint injections.  She states she has near 100% improvement at least 90% improvement after.  She has noted improvement of mobility.  Denies any pain she has  not take any medication.  She denies any numbness or tingling.      IMAGING:   No new imaging    All imaging results were reviewed and discussed with patient.      ASSESSMENT:     1. Lumbar facet arthropathy    2. Spinal stenosis of lumbar region with neurogenic claudication    3. Parkinson's disease, unspecified whether dyskinesia present, unspecified whether manifestations fluctuate (HCC)    4. Bilateral lumbar radiculopathy    5. Obesity (BMI 30-39.9)    6. Gastroesophageal reflux disease without esophagitis    7. Heart palpitations          PLAN:   Reba Price is a 64 year old female following up for low back pain secondary to lumbar facet arthropathy and has responded well to the facet joint injections.  Recommend she continue Tylenol and topical treatment follow-up with me in 3 months in the office.    Follow-up:  3 months    We discussed that a telemedicine visit is in place of an office visit; however, this limits the ability to perform a thorough physical examination which may affect objective findings related to a specific condition and can affect treatment.    The patient verbalized understanding with this plan and was in agreement.  There are no barriers to learning.  All questions were answered.  Please note Dragon dictation software was used to dictate this note which may result in inadvertent typos.    Marquise Aponte D.O. FAAPMR & CAQSM  Physical Medicine and Rehabilitation/Sports Medicine    PAST MEDICAL HISTORY:     Past Medical History:    Constipation    Disorder of thyroid    Esophageal reflux    Hemorrhoids    High cholesterol    Hypercholesterolemia    Hyperlipidemia    Hypothyroidism    Lump, breast    Mitral valve prolapse    Obesity (BMI 30-39.9)    Tremor    Visual impairment    Readers         PAST SURGICAL HISTORY:     Past Surgical History:   Procedure Laterality Date    Adenoidectomy      Colonoscopy  2010    Colonoscopy N/A 9/27/2021    Procedure:  COLONOSCOPY/ESOPHAGOGASTRODUODENOSCOPY (EGD);  Surgeon: Chas Moreno MD;  Location: [facility] ENDOSCOPY    Endometrial biopsy - jar(s): 2      Sarah localization wire 1 site left (cpt=[id number])       appx date    Needle biopsy left            x2    Other surgical history Left     excision fibroadenoma breast     Tonsillectomy           CURRENT MEDICATIONS:     Current Outpatient Medications   Medication Sig Dispense Refill    polyethylene glycol, PEG 3350, 17 g Oral Powd Pack Take 17 g by mouth daily.      carbidopa-levodopa  MG Oral Tab 1 pill  tablet 3    Amantadine HCl 100 MG Oral Cap Take 1 capsule (100 mg total) by mouth 2 (two) times daily. 180 capsule 3    pantoprazole 40 MG Oral Tab EC Take 1 tablet (40 mg total) by mouth nightly. 90 tablet 3    rosuvastatin 5 MG Oral Tab Take 1 tablet (5 mg total) by mouth nightly. 90 tablet 3    levothyroxine 125 MCG Oral Tab Take 1 tablet (125 mcg total) by mouth before breakfast. 90 tablet 3    magnesium 250 MG Oral Tab Take 1 tablet (250 mg total) by mouth.      OMEGA-3 FATTY ACIDS OR Take by mouth daily.      Ergocalciferol (VITAMIN D CAPS 13563 IU OR) Take 1,000 Units by mouth daily.      metoprolol succinate 50 MG Oral Tablet 24 Hr Take 1 tablet (50 mg total) by mouth daily. 30 tablet 11    loratadine 10 MG Oral Tab Take 1 tablet (10 mg total) by mouth as needed for Allergies.           ALLERGIES:   No Known Allergies      FAMILY HISTORY:     Family History   Problem Relation Age of Onset    Lipids Mother         Elevated cholesterol    Cancer Mother         cervical    Dementia Mother         dx-since 2018    Stroke Father     Other (Parkinson's disease) Father     Tremor Father         Had Parkinsons disease    Lipids Brother     Breast Cancer Paternal Grandmother 50    Cancer Paternal Grandmother     Cancer Paternal Grandfather         lung cancer    Breast Cancer Paternal Cousin Female 50          SOCIAL HISTORY:     Social History      Socioeconomic History    Marital status:    Tobacco Use    Smoking status: Never    Smokeless tobacco: Never    Tobacco comments:     My parents were heavy smokers   Vaping Use    Vaping status: Never Used   Substance and Sexual Activity    Alcohol use: Not Currently     Comment: rare    Drug use: No    Sexual activity: Not Currently   Other Topics Concern    Caffeine Concern Yes     Comment: soda, 8oz daily    Pt has a pacemaker No    Pt has a defibrillator No    Reaction to local anesthetic No          REVIEW OF SYSTEMS:   As noted in HPI      PHYSICAL EXAM:   General: No immediate distress  Head: Normocephalic/ Atraumatic  Eyes: Extra-occular movements intact  Ears/Nose/Throat:  External appearance identifies normal appearance without obvious deformity  Cardiovascular: No cyanosis, clubbing or edema  Respiratory: Non-labored respirations  Skin: No lesions noted   Neurological: alert & oriented x 3, attentive, able to follow commands, comprehention intact, spontaneous speech intact  Psychiatric: Mood and affect appropriate  Musculoskeletal Exam:  No change since last exam        LABS:     Lab Results   Component Value Date     (H) 04/29/2023    A1C 6.2 (H) 04/29/2023     Lab Results   Component Value Date    WBC 5.9 04/29/2023    RBC 4.33 04/29/2023    HGB 13.2 04/29/2023    HCT 40.6 04/29/2023    MCV 93.8 04/29/2023    MCH 30.5 04/29/2023    MCHC 32.5 04/29/2023    RDW 11.9 04/29/2023    .0 04/29/2023    MPV 7.0 (L) 01/04/2018     Lab Results   Component Value Date     (H) 04/29/2023    BUN 15 04/29/2023    BUNCREA 16.3 04/29/2023    CREATSERUM 0.92 04/29/2023    ANIONGAP 7 04/29/2023    GFRNAA 68 06/13/2022    GFRAA 79 06/13/2022    CA 9.8 04/29/2023    OSMOCALC 294 04/29/2023    ALKPHO 45 (L) 04/29/2023    AST 17 04/29/2023    ALT 22 04/29/2023    ALKPHOS 46 01/16/2016    BILT 1.0 04/29/2023    TP 7.6 04/29/2023    ALB 3.8 04/29/2023    GLOBULIN 3.8 04/29/2023    AGRATIO 1.1  01/16/2016     04/29/2023    K 4.4 04/29/2023     04/29/2023    CO2 29.0 04/29/2023     Lab Results   Component Value Date    PTP 12.7 03/05/2022    INR 0.95 03/05/2022     Lab Results   Component Value Date    VITD 30.7 04/28/2022

## 2024-08-19 NOTE — PROGRESS NOTES
Subjective:   Patient ID: Reba Price is a 64 year old female.    HPI  Reba returns in follow-up.  She was last seen at endoscopy in September 2021.    As per previous notes the patient underwent a screening colonoscopy and upper endoscopy (mild episodic solid food dysphagia) in September 2021.  The colonoscopy revealed a solitary subcentimeter tubular adenoma.  A 7-year surveillance examination was advised (September 2028).  The upper endoscopy revealed multiple gastric polyps some greater than 1 cm in size that were fundic gland in nature (H. pylori negative).    Current history:  Reba has been diagnosed with Parkinson's disease and is being treated with levodopa/carbidopa.  She has had chronic constipation which worsened when starting the levodopa/carbidopa.  She may not have a bowel movement for 1-1/2 weeks which results in abdominal distention and straining to pass hard stool with some contact bleeding.  She feels that \"something is blocking\" stool evacuation.  She has tried Metamucil which results in bloating.  She has used MiraLAX intermittently but is concerned about becoming \"dependent\".  Docusate twice daily was of no benefit.    History/Other:   Review of Systems  See above    Wt Readings from Last 8 Encounters:   08/19/24 198 lb 8 oz (90 kg)   07/30/24 197 lb (89.4 kg)   06/04/24 197 lb (89.4 kg)   04/17/24 197 lb (89.4 kg)   03/06/24 197 lb (89.4 kg)   03/05/24 197 lb (89.4 kg)   02/01/24 197 lb (89.4 kg)   01/09/24 190 lb (86.2 kg)           Current Outpatient Medications   Medication Sig Dispense Refill    polyethylene glycol, PEG 3350, 17 g Oral Powd Pack Take 17 g by mouth daily.      carbidopa-levodopa  MG Oral Tab 1 pill  tablet 3    Amantadine HCl 100 MG Oral Cap Take 1 capsule (100 mg total) by mouth 2 (two) times daily. 180 capsule 3    pantoprazole 40 MG Oral Tab EC Take 1 tablet (40 mg total) by mouth nightly. 90 tablet 3    rosuvastatin 5 MG Oral Tab Take 1 tablet (5 mg  total) by mouth nightly. 90 tablet 3    levothyroxine 125 MCG Oral Tab Take 1 tablet (125 mcg total) by mouth before breakfast. 90 tablet 3    magnesium 250 MG Oral Tab Take 1 tablet (250 mg total) by mouth.      OMEGA-3 FATTY ACIDS OR Take by mouth daily.      Ergocalciferol (VITAMIN D CAPS 65293 IU OR) Take 1,000 Units by mouth daily.      metoprolol succinate 50 MG Oral Tablet 24 Hr Take 1 tablet (50 mg total) by mouth daily. 30 tablet 11    loratadine 10 MG Oral Tab Take 1 tablet (10 mg total) by mouth as needed for Allergies.       Allergies:No Known Allergies    Objective:   Physical Exam  Vitals and nursing note reviewed.   Constitutional:       General: She is not in acute distress.     Appearance: She is well-developed.   HENT:      Head: Normocephalic and atraumatic.      Mouth/Throat:      Pharynx: No oropharyngeal exudate.   Eyes:      General: No scleral icterus.     Conjunctiva/sclera: Conjunctivae normal.   Neck:      Thyroid: No thyromegaly.   Cardiovascular:      Rate and Rhythm: Normal rate and regular rhythm.      Heart sounds: Normal heart sounds.   Pulmonary:      Effort: Pulmonary effort is normal. No respiratory distress.      Breath sounds: Normal breath sounds. No wheezing or rales.   Abdominal:      General: Bowel sounds are normal. There is no distension.      Palpations: Abdomen is soft. There is no mass.      Tenderness: There is no abdominal tenderness. There is no guarding or rebound.   Musculoskeletal:      Cervical back: Neck supple.   Lymphadenopathy:      Cervical: No cervical adenopathy.   Neurological:      Mental Status: She is alert and oriented to person, place, and time.   Psychiatric:         Behavior: Behavior normal.       Component      Latest Ref Rn 12/19/2022 4/29/2023   WBC      4.0 - 11.0 x10(3) uL 11.1 (H)  5.9    RBC      3.80 - 5.30 x10(6)uL 4.31  4.33    Hemoglobin      12.0 - 16.0 g/dL 13.1  13.2    Hematocrit      35.0 - 48.0 % 40.7  40.6    MCV      80.0 -  100.0 fL 94.4  93.8    MCH      26.0 - 34.0 pg 30.4  30.5    MCHC      31.0 - 37.0 g/dL 32.2  32.5    RDW-SD      35.1 - 46.3 fL 42.5  41.4    RDW      11.0 - 15.0 % 12.1  11.9    Platelet Count      150.0 - 450.0 10(3)uL 306.0  293.0    Prelim Neutrophil Abs      1.50 - 7.70 x10 (3) uL 6.67  2.84    Neutrophils Absolute      1.50 - 7.70 x10(3) uL 6.67  2.84    Lymphocytes Absolute      1.00 - 4.00 x10(3) uL 3.69  2.50    Monocytes Absolute      0.10 - 1.00 x10(3) uL 0.60  0.43    Eosinophils Absolute      0.00 - 0.70 x10(3) uL 0.11  0.10    Basophils Absolute      0.00 - 0.20 x10(3) uL 0.03  0.04    Immature Granulocyte Absolute      0.00 - 1.00 x10(3) uL 0.02  0.01    Neutrophils %      % 59.9  47.9    Lymphocytes %      % 33.2  42.2    Monocytes %      % 5.4  7.3    Eosinophils %      % 1.0  1.7    Basophils %      % 0.3  0.7    Immature Granulocyte %      % 0.2  0.2    Glucose      70 - 99 mg/dL  119 (H)    Sodium      136 - 145 mmol/L  141    Potassium      3.5 - 5.1 mmol/L  4.4    Chloride      98 - 112 mmol/L  105    Carbon Dioxide, Total      21.0 - 32.0 mmol/L  29.0    ANION GAP      0 - 18 mmol/L  7    BUN      7 - 18 mg/dL  15    CREATININE      0.55 - 1.02 mg/dL  0.92    BUN/CREATININE RATIO      10.0 - 20.0   16.3    CALCIUM      8.5 - 10.1 mg/dL  9.8    CALCULATED OSMOLALITY      275 - 295 mOsm/kg  294    EGFR      >=60 mL/min/1.73m2  70    ALT (SGPT)      13 - 56 U/L  22    AST (SGOT)      15 - 37 U/L  17    ALKALINE PHOSPHATASE      50 - 130 U/L  45 (L)    Total Bilirubin      0.1 - 2.0 mg/dL  1.0    PROTEIN, TOTAL      6.4 - 8.2 g/dL  7.6    Albumin      3.4 - 5.0 g/dL  3.8    Globulin      2.8 - 4.4 g/dL  3.8    A/G Ratio      1.0 - 2.0   1.0    Patient Fasting for CMP?  Yes    HEMOGLOBIN A1c      <5.7 %  6.2 (H)    ESTIMATED AVERAGE GLUCOSE      68 - 126 mg/dL  131 (H)    TSH      0.358 - 3.740 mIU/mL  2.620       Legend:  (H) High  (L) Low    Component  Ref Range & Units      Case Report      Surgical Pathology                                Case: [id number]                                     Authorizing Provider:  Chas Moreno MD   Collected:           09/27/2021 10:34 AM             Ordering Location:     [facility]          Received:            09/27/2021 12:27 PM                                    Endoscopy Lab Suites                                                           Pathologist:           Jyoti Matta MD                                                           Specimens:   A) - Colon polyp, cecal                                                                                B) - Colon transverse, polyp                                                                          C) - Gastric polyp                                                                            Final Diagnosis:        A. Cecal colon polyp:  Colonic mucosa with superficial hyperplastic changes.     B. Transverse colon polyp:  Tubular adenoma.     C. Gastric polyp:   Fragments of fundic gland polyp.  Diff-Quik stain (with appropriate control reactivity), is negative for H. pylori microorganisms.        Electronically signed by Jyoti Matta MD on 9/28/2021 at 10:02 AM        Clinical Information      Z12.11 Colon Cancer Screening.  K21.9 Gastroesophageal Reflux Disease Without Esophagitis.        Gross Description      Specimen A is submitted in formalin labeled “Obradovich, cecal polyp” and consists of one fragment of light pink soft tissue measuring 0.3 cm in greatest dimension. The specimen is inked with eosin and submitted entirely in cassette A1.     Specimen B is submitted in formalin labeled “Obradovich, transverse colon polyp” and consists of multiple fragments of pink-tan soft tissue admixed with debris measuring in aggregate 0.8 x 0.5 x 0.2 cm. The specimen is submitted entirely in cassette B1.      Specimen C is submitted in formalin labeled “Obradovich, gastric polyp” and  consists of multiple fragments of red-tan soft tissue measuring in aggregate 4.5 x 3.8 x 0.9 cm. The specimen is submitted entirely in cassettes C1 and C2. (jq)     Jyoti Matta M.D./[facility] Endoscopy Report        Date of Procedure:  09/27/21        Preoperative Diagnosis:  1.  Colorectal cancer screening  2.  Chronic gastroesophageal reflux  3.  Mild episodic solid food dysphagia        Postoperative Diagnosis:  1.  Colon polyps  2.  Sigmoid colon diverticulosis  3.  Internal hemorrhoids  4.  Multiple gastric polyps  5.  Small hiatal hernia        Procedure:    Colonoscopy with polypectomy  Esophagogastroduodenoscopy with polypectomy        Surgeon:  Chas Moreno M.D.        Anesthesia:  Monitored anesthesia care  Cecal withdrawal time: 20 minutes  EBL:  Insignificant        Brief History:  This is a 61 year old female who presents for a screening colonoscopy.  Other than a tendency towards constipation and occasional contact rectal bleeding, she has been asymptomatic from a lower gastrointestinal tract standpoint.  Her last colonoscopy was in 2009.  The patient also has a history of chronic gastroesophageal reflux and occasional difficulty swallowing larger or \"sticky\" foods.  Her reflux symptoms are usually controlled with maintenance pantoprazole.  She will have occasional symptoms with dietary indiscretion.  A concurrent upper endoscopy has been performed as well.        Technique:  After informed consent, the patient was placed in the left lateral recumbent position.  Digital rectal examination revealed no palpable intraluminal abnormalities.  An Olympus variable stiffness 190 series HD colonoscope was inserted into the rectum and advanced under direct vision by following the lumen to the terminal ileum.  The colon was examined upon withdrawal in the left lateral recumbent position.     Following colonoscopy, an Olympus adult HD gastroscope was inserted into the  hypopharynx and advanced under direct vision into the esophagus, stomach and duodenum.  The endoscope was withdrawn to the stomach where retroflexion of the angulus, body, cardia and fundus was performed.  The instrument was straightened, insufflated air and fluid were suctioned and the endoscope was withdrawn.  The procedures were well tolerated without immediate complication.        Findings:  The preparation of the colon was good.  The terminal ileum was examined for 5 cm and visually normal.  The ileocecal valve was well preserved. The visualized colonic mucosa from the cecum to the anal verge was normal with an intact vascular pattern.  There were #2 polyps seen within the colon which removed as follows:     1.  In the cecum there was a 3 mm sessile polyp which was cold snare excised and retrieved.  2.  In the proximal transverse colon there was a 3-4 mm sessile polyp which was cold snare excised and retrieved.     Inspection of both sites revealed no evidence of ongoing bleeding.  There was at least #1 tiny diverticulum seen in the sigmoid colon without signs of complication.  There were no other colonic polyps, mass lesions, vascular anomalies or signs of inflammation seen.  Retroflexion in the rectum and pull-through revealed internal hemorrhoids.     The esophagus was normal without evidence of ulceration, erosion, stricture, ring or Khan's esophagus.  The GE junction and diaphragmatic impression were at 41/42 cm with a 1.5 cm sliding hiatal hernia.  The stomach distended appropriately with insufflated air.  There were multiple gastric polyps likely fundic gland in nature involving the greater curvature.  These numbered in excess of #20.  Multiple polyps were greater than 1 cm in size.  The majority of the larger polyps were cold snare excised and retrieved with a retrieval net.  #1 polypectomy site along the distal greater curvature revealed persistent oozing of blood which was controlled with the  application of a solitary hemostatic clip.  The remainder of the polypectomy sites were hemostatic without intervention.  The remainder of the gastric mucosa was normal.  The antrum was normal and polyp free.  The duodenal bulb and post bulbar regions were normal.        Impression:  1.  Diminutive sigmoid colon polyps  2.  Sigmoid colon diverticulosis, currently uncomplicated  3.  Internal hemorrhoids  4.  Multiple gastric polyps likely fundic gland in nature  5.  Small hiatal hernia     Recommendations:  1.  Standard post polypectomy instructions given.  2.  Continue acid suppression for now.  3.  No aspirin/NSAIDs for 14 days.  4.  Follow-up biopsy results.  5.  Further recommendations pending biopsy.              Chas Moreno MD  9/27/2021      Assessment & Plan:   1. Constipation, unspecified constipation type    The patient has a history of chronic constipation which has worsened since being diagnosed with Parkinson's disease.  We discussed that the symptom worsening is likely due to to autonomic dysfunction from the Parkinson's disease (decreased colonic motility).  There may be a component of outlet delay as well based on the history of straining at the stool.  I have discussed therapeutic options.  Medical therapy will not be curative, however, options include escalating MiraLAX or using a prescription product such as linaclotide, plecanatide, prucalopride or lubiprostone.  We also discussed pelvic floor therapy and the general importance of maintaining high-fiber and fluid intake.  The patient will begin daily MiraLAX and titrate to bowel frequency.  She will contact me if symptoms are refractory.  Pelvic floor therapy will be considered if this is the case.    2.  Colonoscopic screening/surveillance  The patient has a history of a solitary subcentimeter tubular adenoma removed in September 2021.  She would be due for a surveillance colonoscopy in September 2028.         Meds This Visit:  Requested  Prescriptions      No prescriptions requested or ordered in this encounter       Imaging & Referrals:  None

## 2024-08-20 ENCOUNTER — MED REC SCAN ONLY (OUTPATIENT)
Dept: INTERNAL MEDICINE CLINIC | Facility: CLINIC | Age: 64
End: 2024-08-20

## 2024-08-25 NOTE — PATIENT INSTRUCTIONS
1.  Maintain high-fiber and fluid intake.  2.  Use MiraLAX on a daily basis and adjust the dose as needed to maintain bowel regularity.  3.  Please contact me if the symptoms continue despite the above.  Pelvic floor therapy would be an option as would prescription based therapy.  4.  You are due for a colonoscopy in September 2028.

## 2024-09-05 RX ORDER — LEVOTHYROXINE SODIUM 125 UG/1
125 TABLET ORAL
Qty: 90 TABLET | Refills: 0 | Status: SHIPPED | OUTPATIENT
Start: 2024-09-05

## 2024-09-05 NOTE — TELEPHONE ENCOUNTER
Please review. Protocol Failed; No Protocol    Message sent to patient to complete labs     Requested Prescriptions   Pending Prescriptions Disp Refills    LEVOTHYROXINE 125 MCG Oral Tab [Pharmacy Med Name: Levothyroxine Sodium 125 Mcg Tab Lupi] 90 tablet 0     Sig: TAKE 1 TABLET BY MOUTH BEFORE BREAKFAST       Thyroid Medication Protocol Failed - 9/3/2024  3:01 AM        Failed - TSH in past 12 months        Passed - Last TSH value is normal     Lab Results   Component Value Date    TSH 2.620 04/29/2023    THYROIDFUNC 0.86 01/16/2016                 Passed - In person appointment or virtual visit in the past 12 mos or appointment in next 3 mos     Recent Outpatient Visits              2 weeks ago [facility]    2 weeks ago [facility], unspecified constipation type    [facility]    [facility]    1 month ago Spinal stenosis of lumbar region with neurogenic claud[facility]    3 months ago Parkinson's disease without dyskinesia or fluctuating manifestations (HCC)    [facility]    [facility]    4 months ago Spinal stenosis of lumbar region with neurogenic claudication    [facility]                               Recent Outpatient Visits              2 weeks ago [facility]bar facet arthropathy    [facility]    [facility]    2 weeks ago [facility], unspecified constipation type    [facility]    [facility]    1 month ago Spinal stenosis of lumbar region with neurogenic claudication    [facility] [address], [place] Marquise Aponte,     Telemedicine    3 months ago Parkinson's disease without dyskinesia or fluctuating manifestations (HCC)    [facility]    Office Visit    4 months ago Spinal stenosis of lumbar region with neurogenic claudication    [facility] Marquise Aponte DO    Office Visit

## 2024-09-09 ENCOUNTER — MED REC SCAN ONLY (OUTPATIENT)
Dept: INTERNAL MEDICINE CLINIC | Facility: CLINIC | Age: 64
End: 2024-09-09

## 2024-12-09 NOTE — TELEPHONE ENCOUNTER
Please review. Protocol Failed; No Protocol    Message sent to patient to complete labs     Requested Prescriptions   Pending Prescriptions Disp Refills    LEVOTHYROXINE 125 MCG Oral Tab [Pharmacy Med Name: Levothyroxine Sodium 125 Mcg Tab Lupi] 90 tablet 0     Sig: TAKE 1 TABLET BY MOUTH BEFORE BREAKFAST       Thyroid Medication Protocol Failed - 12/9/2024 12:23 PM        Failed - TSH in past 12 months        Passed - Last TSH value is normal     Lab Results   Component Value Date    TSH 2.620 04/29/2023    THYROIDFUNC 0.86 01/16/2016                 Passed - In person appointment or virtual visit in the past 12 mos or appointment in next 3 mos     Recent Outpatient Visits              3 months ago Lumbar facet [facility]    3 months ago Constipation, unspecified constipation type    [facility]    Office Visit    5 months ago Spinal stenosis of lumbar region with neurogenic claudication    [facility]    6 months ago Parkinson's disease without dyskinesia or fluctuating manifestations (HCC)    [facility]    Office Visit    7 months ago Spinal stenosis of lumbar region with neurogenic claudication    [facility]    [facility]                               Recent Outpatient Visits              3 months ago Lumbar facet arthropathy    [facility]    [facility]    3 months ago Constipation, unspecified constipation type    [facility]    Office Visit    5 months ago Spinal stenosis of lumbar region with neurogenic claudication     [facility] [address], [place] Marquise Aponte,     Telemedicine    6 months ago Parkinson's disease without dyskinesia or fluctuating manifestations (HCC)    [facility]    Office Visit    7 months ago Spinal stenosis of lumbar region with neurogenic claudication    [facility] Marquise Aponte DO    Office Visit

## 2024-12-11 RX ORDER — LEVOTHYROXINE SODIUM 125 UG/1
125 TABLET ORAL
Qty: 90 TABLET | Refills: 0 | Status: SHIPPED | OUTPATIENT
Start: 2024-12-11

## 2025-01-30 ENCOUNTER — LAB ENCOUNTER (OUTPATIENT)
Dept: LAB | Age: 65
End: 2025-01-30
Attending: INTERNAL MEDICINE
Payer: MEDICARE

## 2025-01-30 DIAGNOSIS — R73.03 PREDIABETES: ICD-10-CM

## 2025-01-30 DIAGNOSIS — Z00.00 PE (PHYSICAL EXAM), ROUTINE: ICD-10-CM

## 2025-01-30 LAB
ALBUMIN SERPL-MCNC: 4.5 G/DL (ref 3.2–4.8)
ALBUMIN/GLOB SERPL: 1.6 {RATIO} (ref 1–2)
ALP LIVER SERPL-CCNC: 54 U/L
ALT SERPL-CCNC: <7 U/L
ANION GAP SERPL CALC-SCNC: 8 MMOL/L (ref 0–18)
AST SERPL-CCNC: 14 U/L (ref ?–34)
BASOPHILS # BLD AUTO: 0.03 X10(3) UL (ref 0–0.2)
BASOPHILS NFR BLD AUTO: 0.5 %
BILIRUB SERPL-MCNC: 1.2 MG/DL (ref 0.2–1.1)
BUN BLD-MCNC: 13 MG/DL (ref 9–23)
BUN/CREAT SERPL: 11.9 (ref 10–20)
CALCIUM BLD-MCNC: 10.4 MG/DL (ref 8.7–10.4)
CHLORIDE SERPL-SCNC: 102 MMOL/L (ref 98–112)
CHOLEST SERPL-MCNC: 186 MG/DL (ref ?–200)
CO2 SERPL-SCNC: 29 MMOL/L (ref 21–32)
CREAT BLD-MCNC: 1.09 MG/DL
DEPRECATED RDW RBC AUTO: 41.3 FL (ref 35.1–46.3)
EGFRCR SERPLBLD CKD-EPI 2021: 56 ML/MIN/1.73M2 (ref 60–?)
EOSINOPHIL # BLD AUTO: 0.12 X10(3) UL (ref 0–0.7)
EOSINOPHIL NFR BLD AUTO: 2 %
ERYTHROCYTE [DISTWIDTH] IN BLOOD BY AUTOMATED COUNT: 11.9 % (ref 11–15)
EST. AVERAGE GLUCOSE BLD GHB EST-MCNC: 137 MG/DL (ref 68–126)
FASTING PATIENT LIPID ANSWER: YES
FASTING STATUS PATIENT QL REPORTED: YES
GLOBULIN PLAS-MCNC: 2.9 G/DL (ref 2–3.5)
GLUCOSE BLD-MCNC: 119 MG/DL (ref 70–99)
HBA1C MFR BLD: 6.4 % (ref ?–5.7)
HCT VFR BLD AUTO: 42 %
HDLC SERPL-MCNC: 56 MG/DL (ref 40–59)
HGB BLD-MCNC: 13.6 G/DL
IMM GRANULOCYTES # BLD AUTO: 0.01 X10(3) UL (ref 0–1)
IMM GRANULOCYTES NFR BLD: 0.2 %
LDLC SERPL CALC-MCNC: 110 MG/DL (ref ?–100)
LYMPHOCYTES # BLD AUTO: 2.8 X10(3) UL (ref 1–4)
LYMPHOCYTES NFR BLD AUTO: 46 %
MCH RBC QN AUTO: 30.6 PG (ref 26–34)
MCHC RBC AUTO-ENTMCNC: 32.4 G/DL (ref 31–37)
MCV RBC AUTO: 94.6 FL
MONOCYTES # BLD AUTO: 0.48 X10(3) UL (ref 0.1–1)
MONOCYTES NFR BLD AUTO: 7.9 %
NEUTROPHILS # BLD AUTO: 2.65 X10 (3) UL (ref 1.5–7.7)
NEUTROPHILS # BLD AUTO: 2.65 X10(3) UL (ref 1.5–7.7)
NEUTROPHILS NFR BLD AUTO: 43.4 %
NONHDLC SERPL-MCNC: 130 MG/DL (ref ?–130)
OSMOLALITY SERPL CALC.SUM OF ELEC: 289 MOSM/KG (ref 275–295)
PLATELET # BLD AUTO: 302 10(3)UL (ref 150–450)
POTASSIUM SERPL-SCNC: 5 MMOL/L (ref 3.5–5.1)
PROT SERPL-MCNC: 7.4 G/DL (ref 5.7–8.2)
RBC # BLD AUTO: 4.44 X10(6)UL
SODIUM SERPL-SCNC: 139 MMOL/L (ref 136–145)
TRIGL SERPL-MCNC: 114 MG/DL (ref 30–149)
TSI SER-ACNC: 0.75 UIU/ML (ref 0.55–4.78)
VLDLC SERPL CALC-MCNC: 19 MG/DL (ref 0–30)
WBC # BLD AUTO: 6.1 X10(3) UL (ref 4–11)

## 2025-01-30 PROCEDURE — 80053 COMPREHEN METABOLIC PANEL: CPT

## 2025-01-30 PROCEDURE — 84443 ASSAY THYROID STIM HORMONE: CPT

## 2025-01-30 PROCEDURE — 80061 LIPID PANEL: CPT

## 2025-01-30 PROCEDURE — 36415 COLL VENOUS BLD VENIPUNCTURE: CPT

## 2025-01-30 PROCEDURE — 85025 COMPLETE CBC W/AUTO DIFF WBC: CPT

## 2025-01-30 PROCEDURE — 83036 HEMOGLOBIN GLYCOSYLATED A1C: CPT

## 2025-03-11 RX ORDER — LEVOTHYROXINE SODIUM 125 UG/1
125 TABLET ORAL
Qty: 90 TABLET | Refills: 3 | Status: SHIPPED | OUTPATIENT
Start: 2025-03-11

## 2025-03-11 NOTE — TELEPHONE ENCOUNTER
Refill passes per [facility] protocol.    Future Appointments   Date Time Provider Department Center   4/15/2025  1:40 PM Rubio Ochoa MD [address] [place]

## 2025-03-18 ENCOUNTER — OFFICE VISIT (OUTPATIENT)
Dept: INTERNAL MEDICINE CLINIC | Facility: CLINIC | Age: 65
End: 2025-03-18

## 2025-03-18 VITALS
WEIGHT: 202.19 LBS | DIASTOLIC BLOOD PRESSURE: 78 MMHG | OXYGEN SATURATION: 96 % | HEIGHT: 64 IN | SYSTOLIC BLOOD PRESSURE: 132 MMHG | BODY MASS INDEX: 34.52 KG/M2 | HEART RATE: 92 BPM | TEMPERATURE: 98 F

## 2025-03-18 DIAGNOSIS — J45.31 MILD PERSISTENT ASTHMA WITH ACUTE EXACERBATION (HCC): Primary | ICD-10-CM

## 2025-03-18 DIAGNOSIS — R05.8 COUGH PRESENT FOR GREATER THAN 3 WEEKS: ICD-10-CM

## 2025-03-18 DIAGNOSIS — R06.2 WHEEZING: ICD-10-CM

## 2025-03-18 PROCEDURE — 99214 OFFICE O/P EST MOD 30 MIN: CPT | Performed by: NURSE PRACTITIONER

## 2025-03-18 RX ORDER — CODEINE PHOSPHATE AND GUAIFENESIN 10; 100 MG/5ML; MG/5ML
SOLUTION ORAL
COMMUNITY
Start: 2025-03-07 | End: 2025-03-18 | Stop reason: ALTCHOICE

## 2025-03-18 RX ORDER — IPRATROPIUM BROMIDE AND ALBUTEROL SULFATE 2.5; .5 MG/3ML; MG/3ML
SOLUTION RESPIRATORY (INHALATION)
COMMUNITY
Start: 2025-03-07

## 2025-03-18 RX ORDER — FAMOTIDINE 40 MG/1
TABLET, FILM COATED ORAL
COMMUNITY
Start: 2025-03-17

## 2025-03-18 RX ORDER — ALBUTEROL SULFATE 90 UG/1
INHALANT RESPIRATORY (INHALATION)
COMMUNITY
Start: 2025-03-17

## 2025-03-18 RX ORDER — DEXAMETHASONE 2 MG/1
2 TABLET ORAL 2 TIMES DAILY
COMMUNITY
Start: 2025-02-21 | End: 2025-03-18 | Stop reason: ALTCHOICE

## 2025-03-18 RX ORDER — CEFDINIR 300 MG/1
CAPSULE ORAL
COMMUNITY
Start: 2025-03-07 | End: 2025-03-18 | Stop reason: ALTCHOICE

## 2025-03-18 RX ORDER — PREDNISONE 20 MG/1
40 TABLET ORAL DAILY
Qty: 10 TABLET | Refills: 0 | Status: SHIPPED | OUTPATIENT
Start: 2025-03-18 | End: 2025-03-23

## 2025-03-18 RX ORDER — BUDESONIDE 1 MG/2ML
INHALANT ORAL
COMMUNITY
Start: 2025-03-07

## 2025-03-18 RX ORDER — AZITHROMYCIN 250 MG/1
TABLET, FILM COATED ORAL
COMMUNITY
Start: 2025-02-21 | End: 2025-03-18 | Stop reason: ALTCHOICE

## 2025-03-18 RX ORDER — FLUTICASONE FUROATE AND VILANTEROL 100; 25 UG/1; UG/1
1 POWDER RESPIRATORY (INHALATION) DAILY
Qty: 1 EACH | Refills: 0 | Status: SHIPPED | OUTPATIENT
Start: 2025-03-18

## 2025-03-18 NOTE — PROGRESS NOTES
Subjective:   Reba Price is a 64 year old female with PMH Parkinson's, HTN, HL, GERD who presents for Cough (1 month)     2/16 sxs started. Has been to outside immediate care center 3 times since this started approx 1 month ago  1st visit 2/21: rx'd dexamethasone x 3d and zpack - felt a little better on steroids  2nd visit 3/7: rales. Gave IM rocephin, budesonide neb, and 7d of cefdinir (?)  3rd visit 3/17: CXR negative for pna. Was told she has wheezing and that they think it might be GERD.    Still having coughing fits that make it difficult to catch her breath. Otherwise no difficulty breathing. Has a hoarse voice.     Using inhaler twice daily - doesn't seem to really help. Doesn't like the side effects (shakiness). Uses albuterol neb prior to bed. Not sleeping well. Had codeine cough syrup which gives her nightmares and doesn't seem to help.    Doesn't think she has a h/o COPD though it's on her problem list - states with most colds she gets a little wheezing, but otherwise hasn't used neb in 15 years and only uses inhalers here and there with illness.    Has tried combo cough products. No fevers. No belly pain. No other symptoms.    Benzonatate doesn't help her  Has GERD, on pantoprazole. No reflux symptoms.  Seasonal allergies - started claritin end of January. Hasn't been helping.    History/Other:    Chief Complaint Reviewed and Verified  Nursing Notes Reviewed and   Verified  Tobacco Reviewed  Allergies Reviewed  Medications Reviewed    Problem List Reviewed  Medical History Reviewed  Surgical History   Reviewed  OB Status Reviewed  Family History Reviewed  Social History   Reviewed         Tobacco:  She has never smoked tobacco.    Current Outpatient Medications   Medication Sig Dispense Refill    albuterol 108 (90 Base) MCG/ACT Inhalation Aero Soln       Budesonide 1 MG/2ML Inhalation Suspension give twice a day via nebulization for 7 days      famotidine 40 MG Oral Tab        ipratropium-albuterol 0.5-2.5 (3) MG/3ML Inhalation Solution take twice a day via nebulization as needed for asthma      predniSONE 20 MG Oral Tab Take 2 tablets (40 mg total) by mouth daily for 5 days. 10 tablet 0    fluticasone furoate-vilanterol (BREO ELLIPTA) 100-25 MCG/ACT Inhalation Aerosol Powder, Breath Activated Inhale 1 puff into the lungs daily. 1 each 0    levothyroxine 125 MCG Oral Tab Take 1 tablet (125 mcg total) by mouth before breakfast. 90 tablet 3    polyethylene glycol, PEG 3350, 17 g Oral Powd Pack Take 17 g by mouth daily.      carbidopa-levodopa  MG Oral Tab 1 pill  tablet 3    Amantadine HCl 100 MG Oral Cap Take 1 capsule (100 mg total) by mouth 2 (two) times daily. 180 capsule 3    pantoprazole 40 MG Oral Tab EC Take 1 tablet (40 mg total) by mouth nightly. 90 tablet 3    rosuvastatin 5 MG Oral Tab Take 1 tablet (5 mg total) by mouth nightly. 90 tablet 3    magnesium 250 MG Oral Tab Take 1 tablet (250 mg total) by mouth.      OMEGA-3 FATTY ACIDS OR Take by mouth daily.      Ergocalciferol (VITAMIN D CAPS 86656 IU OR) Take 1,000 Units by mouth daily.      metoprolol succinate 50 MG Oral Tablet 24 Hr Take 1 tablet (50 mg total) by mouth daily. 30 tablet 11    loratadine 10 MG Oral Tab Take 1 tablet (10 mg total) by mouth as needed for Allergies.           Review of Systems:  Review of Systems  10 point review of systems otherwise negative with the exception of HPI and assessment and plan.    Objective:   /78   Pulse 92   Temp 97.9 °F (36.6 °C) (Temporal)   Ht 5' 4\" (1.626 m)   Wt 202 lb 3.2 oz (91.7 kg)   LMP 12/01/2016   SpO2 96%   BMI 34.71 kg/m²  Estimated body mass index is 34.71 kg/m² as calculated from the following:    Height as of this encounter: 5' 4\" (1.626 m).    Weight as of this encounter: 202 lb 3.2 oz (91.7 kg).      Physical Exam  Vitals reviewed.   Constitutional:       General: She is not in acute distress.  HENT:      Right Ear: No middle ear  effusion. Tympanic membrane is erythematous.      Left Ear:  No middle ear effusion. Tympanic membrane is erythematous.      Mouth/Throat:      Mouth: Mucous membranes are moist.      Pharynx: Oropharynx is clear.   Cardiovascular:      Rate and Rhythm: Normal rate and regular rhythm.      Heart sounds: Normal heart sounds. No murmur heard.  Pulmonary:      Effort: Pulmonary effort is normal. No accessory muscle usage or respiratory distress.      Breath sounds: Examination of the right-upper field reveals wheezing. Examination of the left-upper field reveals wheezing. Examination of the left-lower field reveals wheezing. Wheezing present.   Musculoskeletal:      Cervical back: No tenderness.   Lymphadenopathy:      Cervical: No cervical adenopathy.   Skin:     General: Skin is warm and dry.   Neurological:      Mental Status: She is alert.         Assessment & Plan:   1. Mild persistent asthma with acute exacerbation (HCC) (Primary)  -     predniSONE; Take 2 tablets (40 mg total) by mouth daily for 5 days.  Dispense: 10 tablet; Refill: 0  -     Bedside FVC Spirometry; Future; Expected date: 03/18/2025  -     Pulmonary Referral - In Network  -     Fluticasone Furoate-Vilanterol; Inhale 1 puff into the lungs daily.  Dispense: 1 each; Refill: 0  - Will treat with prednisone x 5d and add maintenance inhaler - check with insurance on out of pocket cost, can substitute if needed.  - Continue albuterol rescue as needed.  - Try nyquil HBP or coricidin HBP. Continue claritin. Drink plenty of fluids. Try to get enough sleep.  - Recommend spirometry and pulm follow up.  - F/u in a week if no improvement or sooner for any worsening.  2. Wheezing  -     predniSONE; Take 2 tablets (40 mg total) by mouth daily for 5 days.  Dispense: 10 tablet; Refill: 0  -     Bedside FVC Spirometry; Future; Expected date: 03/18/2025  -     Pulmonary Referral - In Network  -     Fluticasone Furoate-Vilanterol; Inhale 1 puff into the lungs daily.   Dispense: 1 each; Refill: 0  - As above.  3. Cough present for greater than 3 weeks  -     predniSONE; Take 2 tablets (40 mg total) by mouth daily for 5 days.  Dispense: 10 tablet; Refill: 0  -     Bedside FVC Spirometry; Future; Expected date: 03/18/2025  -     Pulmonary Referral - In Network  -     Fluticasone Furoate-Vilanterol; Inhale 1 puff into the lungs daily.  Dispense: 1 each; Refill: 0  - As above.        Return in about 1 week (around 3/25/2025).    ROGER Hatfield, 3/18/2025, 1:59 PM     This note was prepared using Dragon Medical voice recognition dictation software. As a result errors may occur. When identified, these errors have been corrected. While every attempt is made to correct errors during dictation discrepancies may still exist.

## 2025-03-27 ENCOUNTER — ORDER TRANSCRIPTION (OUTPATIENT)
Dept: ADMINISTRATIVE | Facility: HOSPITAL | Age: 65
End: 2025-03-27

## 2025-03-27 DIAGNOSIS — R05.8 MILLERS' COUGH: ICD-10-CM

## 2025-03-27 DIAGNOSIS — J45.31 MILD PERSISTENT ASTHMA WITH EXACERBATION (HCC): Primary | ICD-10-CM

## 2025-03-27 DIAGNOSIS — R06.2 WHEEZING: ICD-10-CM

## 2025-03-31 ENCOUNTER — HOSPITAL ENCOUNTER (OUTPATIENT)
Dept: RESPIRATORY THERAPY | Facility: HOSPITAL | Age: 65
Discharge: HOME | End: 2025-03-31
Attending: Nurse Practitioner
Payer: MEDICARE

## 2025-03-31 DIAGNOSIS — R05.8 MILLERS' COUGH: ICD-10-CM

## 2025-03-31 DIAGNOSIS — R06.2 WHEEZING: ICD-10-CM

## 2025-03-31 DIAGNOSIS — J45.31 MILD PERSISTENT ASTHMA WITH EXACERBATION (HCC): ICD-10-CM

## 2025-03-31 PROCEDURE — 94726 PLETHYSMOGRAPHY LUNG VOLUMES: CPT | Performed by: INTERNAL MEDICINE

## 2025-03-31 PROCEDURE — 94060 EVALUATION OF WHEEZING: CPT | Performed by: INTERNAL MEDICINE

## 2025-03-31 PROCEDURE — 94729 DIFFUSING CAPACITY: CPT | Performed by: INTERNAL MEDICINE

## 2025-04-01 PROBLEM — R05.8: Status: ACTIVE | Noted: 2025-04-01

## 2025-04-01 PROBLEM — R06.2 WHEEZING: Status: ACTIVE | Noted: 2025-04-01

## 2025-04-02 NOTE — PROCEDURES
PULMONARY FUNCTION TESTING INTERPRETATION        Spirometry:  Forced vital capacity (FVC) is: normal   Forced expiratory volume in 1 second (FEV1) is: normal   FEV1/FVC ratio: normal   Significant bronchodilator response? No         Flow Volume Loop:  Normal        Lung Volume:  Total lung capacity (TLC) is: normal   Residual volume (RV) is: increased         Diffusing Capacity:  Normal         Interpretation:  Normal spirometry values.   There is no significant bronchodilator response.  This does not preclude the use of bronchodilator therapy if clinically indicated.  Normal flow volume loop.   Air trapping is present.   Normal diffusing capacity.   Correlate clinically.  The PFT raw data is available in EPIC EMR under CHART REVIEW under the PROCEDURES tab. Please click on the the PFT and there should be an attached hyperlink which shows the attached test when clicked on.     Electronically signed by    ANNABEL Mccarthy DO, MPH  Pulmonary Critical Care Medicine  [facility]

## 2025-04-15 ENCOUNTER — OFFICE VISIT (OUTPATIENT)
Dept: INTERNAL MEDICINE CLINIC | Facility: CLINIC | Age: 65
End: 2025-04-15
Payer: MEDICARE

## 2025-04-15 VITALS
BODY MASS INDEX: 34.34 KG/M2 | SYSTOLIC BLOOD PRESSURE: 133 MMHG | HEART RATE: 89 BPM | WEIGHT: 201.13 LBS | HEIGHT: 64 IN | DIASTOLIC BLOOD PRESSURE: 85 MMHG

## 2025-04-15 DIAGNOSIS — E66.9 OBESITY (BMI 30-39.9): ICD-10-CM

## 2025-04-15 DIAGNOSIS — L85.3 DRY SKIN: ICD-10-CM

## 2025-04-15 DIAGNOSIS — R91.1 INCIDENTAL LUNG NODULE, > 3MM AND < 8MM: ICD-10-CM

## 2025-04-15 DIAGNOSIS — R00.2 HEART PALPITATIONS: ICD-10-CM

## 2025-04-15 DIAGNOSIS — M94.262 CHONDROMALACIA, LEFT KNEE: ICD-10-CM

## 2025-04-15 DIAGNOSIS — R17 ELEVATED BILIRUBIN: ICD-10-CM

## 2025-04-15 DIAGNOSIS — Z00.00 ENCOUNTER FOR ANNUAL HEALTH EXAMINATION: Primary | ICD-10-CM

## 2025-04-15 DIAGNOSIS — J45.31 MILD PERSISTENT ASTHMA WITH ACUTE EXACERBATION (HCC): ICD-10-CM

## 2025-04-15 DIAGNOSIS — E06.3 HYPOTHYROIDISM DUE TO HASHIMOTO'S THYROIDITIS: ICD-10-CM

## 2025-04-15 DIAGNOSIS — J44.9 CHRONIC OBSTRUCTIVE PULMONARY DISEASE, UNSPECIFIED COPD TYPE (HCC): ICD-10-CM

## 2025-04-15 DIAGNOSIS — R73.03 PREDIABETES: ICD-10-CM

## 2025-04-15 DIAGNOSIS — Z12.31 ENCOUNTER FOR SCREENING MAMMOGRAM FOR BREAST CANCER: ICD-10-CM

## 2025-04-15 DIAGNOSIS — R25.1 TREMOR OF BOTH HANDS: ICD-10-CM

## 2025-04-15 DIAGNOSIS — E78.5 HYPERLIPIDEMIA, UNSPECIFIED HYPERLIPIDEMIA TYPE: ICD-10-CM

## 2025-04-15 DIAGNOSIS — K21.9 GASTROESOPHAGEAL REFLUX DISEASE WITHOUT ESOPHAGITIS: ICD-10-CM

## 2025-04-15 DIAGNOSIS — G20.A1 PARKINSON'S DISEASE, UNSPECIFIED WHETHER DYSKINESIA PRESENT, UNSPECIFIED WHETHER MANIFESTATIONS FLUCTUATE (HCC): ICD-10-CM

## 2025-04-15 PROBLEM — E53.8 VITAMIN B12 DEFICIENCY: Status: RESOLVED | Noted: 2022-07-12 | Resolved: 2025-04-15

## 2025-04-15 PROBLEM — D17.1 LIPOMA OF CHEST WALL: Status: RESOLVED | Noted: 2017-12-28 | Resolved: 2025-04-15

## 2025-04-15 PROBLEM — R12 HEART BURN: Status: RESOLVED | Noted: 2022-07-12 | Resolved: 2025-04-15

## 2025-04-15 PROBLEM — M65.962 SYNOVITIS OF LEFT KNEE: Status: RESOLVED | Noted: 2022-03-24 | Resolved: 2025-04-15

## 2025-04-15 PROBLEM — R63.2 INCREASED APPETITE: Status: RESOLVED | Noted: 2022-04-28 | Resolved: 2025-04-15

## 2025-04-15 RX ORDER — TIOTROPIUM BROMIDE INHALATION SPRAY 3.12 UG/1
2 SPRAY, METERED RESPIRATORY (INHALATION) DAILY
Qty: 4 G | Refills: 2 | Status: SHIPPED | OUTPATIENT
Start: 2025-04-15

## 2025-04-15 NOTE — PROGRESS NOTES
Subjective:   Reba Price is a 64 year old female who presents for a Medicare Initial Preventative Physical Exam (Welcome to Medicare- < 12 months on Medicare) and scheduled follow up of multiple significant but stable problems.   History of Present Illness            Pt presents to clinic for annual physical.   History/Other:   Fall Risk Assessment:   She has been screened for Falls and is low risk.      Cognitive Assessment:   She had a completely normal cognitive assessment - see flowsheet entries       Functional Ability/Status:   Reba Price has a completely normal functional assessment. See flowsheet for details.      Depression Screening (PHQ):  PHQ-2 SCORE: 0  , done 4/15/2025        Advanced Directives:   She does NOT have a Living Will. [Do you have a living will?: No]  She does have a POA but we do NOT have it on file in Epic.    Discussed Advance Care Planning with patient (and family/surrogate if present). Standard forms made available to patient in After Visit Summary.      Patient Active Problem List   Diagnosis    Encounter for therapeutic drug monitoring    GERD (gastroesophageal reflux disease)    Hypothyroid    Hyperlipemia    Dyslipidemia    Dry skin    Eye muscle twitches    Allergic rhinitis    Breast lump on left side at 9 o'clock position    Goiter    Hypothyroidism due to Hashimoto's thyroiditis    Incidental lung nodule, > 3mm and < 8mm    Heart palpitations    Degenerative tear of left medial meniscus    Chondromalacia, left knee    Obesity (BMI 30-39.9)    Weight gain    Tremor of both hands    Chronic obstructive pulmonary disease, unspecified (HCC)    Parkinson's disease (HCC)    Hyperlipidemia    Mild persistent asthma with acute exacerbation (HCC)    Wheezing    Millers' cough    Prediabetes    Elevated bilirubin     Allergies:  She has no known allergies.    Current Medications:  Outpatient Medications Marked as Taking for the 4/15/25 encounter (Office Visit) with Emeka  ROGER Vicente   Medication Sig    Tiotropium Bromide Monohydrate (SPIRIVA RESPIMAT) 2.5 MCG/ACT Inhalation Aero Soln Inhale 2 Inhalations into the lungs daily.    albuterol 108 (90 Base) MCG/ACT Inhalation Aero Soln     ipratropium-albuterol 0.5-2.5 (3) MG/3ML Inhalation Solution take twice a day via nebulization as needed for asthma    fluticasone furoate-vilanterol (BREO ELLIPTA) 100-25 MCG/ACT Inhalation Aerosol Powder, Breath Activated Inhale 1 puff into the lungs daily.    levothyroxine 125 MCG Oral Tab Take 1 tablet (125 mcg total) by mouth before breakfast.    polyethylene glycol, PEG 3350, 17 g Oral Powd Pack Take 17 g by mouth daily.    carbidopa-levodopa  MG Oral Tab 1 pill TID    Amantadine HCl 100 MG Oral Cap Take 1 capsule (100 mg total) by mouth 2 (two) times daily.    pantoprazole 40 MG Oral Tab EC Take 1 tablet (40 mg total) by mouth nightly.    rosuvastatin 5 MG Oral Tab Take 1 tablet (5 mg total) by mouth nightly.    magnesium 250 MG Oral Tab Take 1 tablet (250 mg total) by mouth.    OMEGA-3 FATTY ACIDS OR Take by mouth daily.    Ergocalciferol (VITAMIN D CAPS 91346 IU OR) Take 1,000 Units by mouth daily.    metoprolol succinate 50 MG Oral Tablet 24 Hr Take 1 tablet (50 mg total) by mouth daily.    loratadine 10 MG Oral Tab Take 1 tablet (10 mg total) by mouth as needed for Allergies.       Medical History:  She  has a past medical history of Amenorrhea (Last period 12/2016), Anemia (08/11/2014), Constipation (often), Disorder of thyroid, Elevated blood sugar (09/22/2015), Esophageal reflux, Gastroesophageal reflux disease (05/11/2016), Heart burn (07/12/2022), Hemorrhoids (during pregnancies), High cholesterol, Hypercholesterolemia, Hyperlipidemia, Hypothyroidism, Increased appetite (04/28/2022), Leg swelling (while at work & on my feet intermittent foot), Lipoma of chest wall (12/28/2017), Low ferritin (08/26/2014), Lump, breast, Mitral valve prolapse, Numbness (intermittent foot),  Obesity (BMI 30-39.9), Palpitations (2022), Synovitis of left knee (2022), Tremor (left hand & sometime legs), Visual impairment, and Vitamin B12 deficiency (2022).  Surgical History:  She  has a past surgical history that includes ; endometrial biopsy - jar(s): 2 (); adenoidectomy; tonsillectomy; needle biopsy left; other surgical history (Left); francis localization wire 1 site left (cpt=19281); colonoscopy (); and colonoscopy (N/A, 2021).   Family History:  Her family history includes Breast Cancer in her paternal grandmother; Breast Cancer (age of onset: 50) in her paternal cousin female; Cancer in her mother, paternal grandfather, and paternal grandmother; Dementia in her mother; Heart Attack in her maternal grandfather; High Cholesterol in her mother; Hypertension in her mother; Lipids in her brother and mother; Parkinson's disease in her father; Stroke in her father; Tremor in her father.  Social History:  She  reports that she has never smoked. She has never used smokeless tobacco. She reports that she does not currently use alcohol. She reports that she does not use drugs.    Tobacco:  She has never smoked tobacco.    CAGE Alcohol Screen:   CAGE screening score of 0 on 4/15/2025, showing low risk of alcohol abuse.      Patient Care Team:  Rubio Ochoa MD as PCP - General (Internal Medicine)  Bassam Perera MD as Consulting Physician (BARIATRICS)  Ra Greene DO (OBSTETRICS & GYNECOLOGY)  Senthil Newberry MD (NEUROLOGY)  Anaya Alarcon, DEMETRICE as Physical Therapist  More Laughlin PT (Physical Therapy)  Chas Moreno MD (GASTROENTEROLOGY)    Review of Systems   Constitutional:  Negative for activity change, appetite change, chills, diaphoresis, fatigue, fever and unexpected weight change.   HENT:  Negative for congestion.    Eyes:  Negative for visual disturbance.   Respiratory:  Negative for cough, chest tightness, shortness of breath and wheezing.     Cardiovascular:  Negative for chest pain, palpitations and leg swelling.   Gastrointestinal:  Negative for abdominal pain, constipation, diarrhea, nausea and vomiting.   Endocrine: Negative.    Genitourinary:  Negative for difficulty urinating and vaginal bleeding.   Musculoskeletal:  Negative for arthralgias and back pain.   Skin: Negative.    Neurological:  Negative for dizziness, seizures, numbness and headaches.   Hematological: Negative.    Psychiatric/Behavioral: Negative.           Objective:   Physical Exam  Vitals reviewed.   Constitutional:       General: She is not in acute distress.     Appearance: Normal appearance. She is normal weight. She is not ill-appearing.   HENT:      Head: Normocephalic and atraumatic.      Right Ear: Tympanic membrane, ear canal and external ear normal.      Left Ear: Tympanic membrane, ear canal and external ear normal.      Nose: Nose normal.      Mouth/Throat:      Pharynx: Oropharynx is clear.   Eyes:      General: No scleral icterus.        Right eye: No discharge.         Left eye: No discharge.      Extraocular Movements: Extraocular movements intact.      Conjunctiva/sclera: Conjunctivae normal.      Pupils: Pupils are equal, round, and reactive to light.   Neck:      Thyroid: No thyroid mass or thyromegaly.   Cardiovascular:      Rate and Rhythm: Normal rate and regular rhythm.      Pulses: Normal pulses.      Heart sounds: Normal heart sounds.   Pulmonary:      Effort: Pulmonary effort is normal. No respiratory distress.      Breath sounds: Normal breath sounds.   Abdominal:      General: Abdomen is flat. Bowel sounds are normal. There is no distension.      Palpations: Abdomen is soft. There is no mass.      Tenderness: There is no abdominal tenderness.   Musculoskeletal:         General: Normal range of motion.      Cervical back: Normal range of motion and neck supple.      Right lower leg: No edema.      Left lower leg: No edema.   Lymphadenopathy:       Cervical: No cervical adenopathy.   Skin:     General: Skin is warm and dry.      Coloration: Skin is not jaundiced.   Neurological:      General: No focal deficit present.      Mental Status: She is alert and oriented to person, place, and time.      Motor: Motor function is intact.      Gait: Gait normal.   Psychiatric:         Mood and Affect: Mood normal.         Judgment: Judgment normal.           /85 (BP Location: Left arm, Patient Position: Sitting, Cuff Size: adult)   Pulse 89   Ht 5' 4\" (1.626 m)   Wt 201 lb 2 oz (91.2 kg)   LMP 12/01/2016   BMI 34.52 kg/m²  Estimated body mass index is 34.52 kg/m² as calculated from the following:    Height as of this encounter: 5' 4\" (1.626 m).    Weight as of this encounter: 201 lb 2 oz (91.2 kg).    Medicare Hearing Assessment:   Hearing Screening    Time taken: 4/15/2025  2:52 PM  Entry User: Diana Street MA  Screening Method: Finger Rub  Finger Rub Result: Pass         Visual Acuity:   Right Eye Visual Acuity: Corrected Right Eye Chart Acuity: 20/20   Left Eye Visual Acuity: Corrected Left Eye Chart Acuity: 20/25   Both Eyes Visual Acuity: Corrected Both Eyes Chart Acuity: 20/25   Able To Tolerate Visual Acuity: Yes        Assessment & Plan:   Reba Price is a 64 year old female who presents for a Medicare Assessment.     1. Encounter for annual health examination (Primary)  Education provided on healthy lifestyle.  Diet: reduce saturated fats, simple carbs and excess sugars. Hydrate. Leafy greens, legumes, nuts/seeds, healthy sources of Omega 3, lean proteins, complex carbs, berries.   Exercise 30 min daily cardio as tolerated.   Immunizations reviewed and recommendations provided  Preventative health screening recommendations reviewed.   Previous lab and imaging results reviewed.    2. Prediabetes  -A1c ordered  -     Hemoglobin A1C; Future; Expected date: 04/15/2025  -     Comp Metabolic Panel (14); Future; Expected date: 04/15/2025  3.  Elevated bilirubin  -Recheck CMP  -     Comp Metabolic Panel (14); Future; Expected date: 04/15/2025  4. Encounter for screening mammogram for breast cancer  -Mammogram ordered.   -     [facility] BREANNA 2D+3D SCREENING BILAT (CPT=77067/39858); Future; Expected date: 04/15/2025  5. Parkinson's disease, unspecified whether dyskinesia present, unspecified whether manifestations fluctuate (HCC)  -Stable, CPM, following with neurology.   6. Chronic obstructive pulmonary disease, unspecified COPD type (HCC)  -Pt is scheduled with pulmonology 6/2025. Using Breo. Some coughing persists when off medication. No new or worsening sx reported. Advised to keep appt with pulm. Will add antimuscarinic Spiriva. Reviewed use.   7. Hyperlipidemia, unspecified hyperlipidemia type  -FLP ordered.   8. Heart palpitations  Stable, follows with Cardiology. Continue metoprolol.   9. Mild persistent asthma with acute exacerbation (HCC)  -Pt is scheduled with pulmonology 6/2025. Using Breo. Some coughing persists when off medication. No new or worsening sx reported. Advised to keep appt with pulm. Will add antimuscarinic Spiriva. Reviewed use.   10. Incidental lung nodule, > 3mm and < 8mm  -CT chest completed 6/2021; no suspicious pulmonary findings. 6 mm in diameter benign calcification/granuloma in the right posterior chest wall adjacent to the 8 rib which appears to be corresponding to intermediate nodular focus that was noted on 5/25/2021.   11. Hypothyroidism due to Hashimoto's thyroiditis  -Stable, CPM.   12. Obesity (BMI 30-39.9)  -Healthy lifestyle and weight loss. Provided information for weight specialist.   13. Tremor of both hands  -Hx of Parkison's. Stable, following with Neurology.   14. Gastroesophageal reflux disease without esophagitis  -Stable, CPM.   15. Chondromalacia, left knee  -Stable, follow with Ortho PRN.   16. Dry skin  -Stable, reviewed tx.   Other orders  -     Spiriva Respimat; Inhale 2 Inhalations into the lungs daily.   Dispense: 4 g; Refill: 2  [unfilled]            The patient indicates understanding of these issues and agrees to the plan.  Continue with current treatment plan.  Lab work ordered.  Reinforced healthy diet, lifestyle, and exercise.      Return in 6 months (on 10/15/2025).     ROGER Brown, 4/15/2025     Supplementary Documentation:   General Health:  In the past six months, have you lost more than 10 pounds without trying?: 2 - No  Has your appetite been poor?: No  Type of Diet: Balanced  How does the patient maintain a good energy level?: Appropriate Exercise, Stretching  How would you describe your daily physical activity?: Moderate  How would you describe your current health state?: Good  How do you maintain positive mental well-being?: Social Interaction, Puzzles, Visiting Family  On a scale of 0 to 10, with 0 being no pain and 10 being severe pain, what is your pain level?: 0 - (None)  In the past six months, have you experienced urine leakage?: 0-No  At any time do you feel concerned for the safety/well-being of yourself and/or your children, in your home or elsewhere?: No  Have you had any immunizations at another office such as Influenza, Hepatitis B, Tetanus, or Pneumococcal?: No    Health Maintenance   Topic Date Due    Pneumococcal Vaccine: 50+ Years (1 of 2 - PCV) Never done    Zoster Vaccines (1 of 2) Never done    COVID-19 Vaccine (5 - 2024-25 season) 09/01/2024    Annual Physical  02/01/2025    Mammogram  07/12/2025    Influenza Vaccine (Season Ended) 10/01/2025    Colorectal Cancer Screening  09/27/2028    DEXA Scan  Completed    Annual Depression Screening  Completed    Fall Risk Screening (Annual)  Completed    Meningococcal B Vaccine  Aged Out

## 2025-04-28 DIAGNOSIS — K21.9 GASTROESOPHAGEAL REFLUX DISEASE: ICD-10-CM

## 2025-04-30 RX ORDER — PANTOPRAZOLE SODIUM 40 MG/1
40 TABLET, DELAYED RELEASE ORAL NIGHTLY
Qty: 90 TABLET | Refills: 3 | Status: SHIPPED | OUTPATIENT
Start: 2025-04-30

## 2025-04-30 NOTE — TELEPHONE ENCOUNTER
Refill Per Protocol     Requested Prescriptions   Pending Prescriptions Disp Refills    PANTOPRAZOLE 40 MG Oral Tab EC [Pharmacy Med Name: Pantoprazole Sodium Ec 40 Mg Tab Nort] 90 tablet 0     Sig: Take 1 tablet (40 mg total) by mouth nightly.       Gastrointestional Medication Protocol Passed - 4/30/2025  3:21 PM        Passed - In person appointment or virtual visit in the past 12 mos or appointment in next 3 mos     Recent Outpatient Visits              2 weeks ago Encounter for annual health examination    [facility], [address], [place] Carola Hendrickson APRN    Office Visit    1 month ago Mild persistent asthma with acute exacerbation (HCC)    [facility] Eliza Sams APRN    Office Visit    8 months ago Lumbar facet arthropathy    [facility] Marquise Aponte,     Telemedicine    8 months ago Constipation, unspecified constipation type    [facility] Chas Moreno MD    Office Visit    9 months ago Spinal stenosis of lumbar region with neurogenic claudication    [facility], [address], [place] Marquise Aponte,     Telemedicine          Future Appointments         Provider Department Appt Notes    In 1 month Jose Madison PA-C [facility] Persistent cough & wheeze with any cold symptoms lasting weeks & months                    Passed - Medication is active on med list

## 2025-05-27 RX ORDER — AMANTADINE HYDROCHLORIDE 100 MG/1
100 CAPSULE, GELATIN COATED ORAL 2 TIMES DAILY
Qty: 180 CAPSULE | Refills: 0 | Status: SHIPPED | OUTPATIENT
Start: 2025-05-27

## 2025-05-27 NOTE — TELEPHONE ENCOUNTER
Requested Prescriptions     Pending Prescriptions Disp Refills    AMANTADINE  MG Oral Cap [Pharmacy Med Name: Amantadine Hcl 100 Mg Cap Jazlyn] 180 capsule 0     Sig: Take 1 capsule (100 mg total) by mouth 2 (two) times daily.       Last OV: 6/4/2024  Next OV:     [place]/;  Amantadine HCl     Dispensed Written Strength Quantity Refills Days Supply Provider Pharmacy   AMANTADINE 100 MG CAP JAZLYN 02/23/2025 06/04/2024 100 180 each  90 Senthil Newberry MD [id number] - [place]...   AMANTADINE 100 MG CAP JAZLYN 11/29/2024 06/04/2024 100 180 each  90 Senthil Newberry MD [id number] - [place]..       Last office visit plan;   Assessment  1. Tremor of both hands     Amantadine was very tried and it was helpful in terms of tremors.    She did do LSVT therapy and it was helpful as well.  Further progression of disease.  Continue with a small dose of carbidopa-levodopa at this point, discussed importance of exercise.     2. PD (Parkinson's disease) ([facility])       Education and counseling provided to patient. Instructed patient to call my office or seek medical attention immediately if symptoms worsen.  Patient verbalized understanding of information given. All questions were answered. All side effects of drugs were discussed.      Return to clinic in: No follow-ups on file.     Senthil Newberry MD           Instructions      Return in about 6 months (around 12/4/2024).

## 2025-06-02 ENCOUNTER — TELEPHONE (OUTPATIENT)
Dept: FAMILY MEDICINE CLINIC | Facility: CLINIC | Age: 65
End: 2025-06-02

## 2025-06-02 NOTE — TELEPHONE ENCOUNTER
Tiotropium Bromide Monohydrate (SPIRIVA RESPIMAT) 2.5 MCG/ACT Inhalation Aero Soln Inhale 2 Inhalations into the lungs daily. 4 g 2     Prior Authorization required  KEY: [id number]

## 2025-06-03 NOTE — TELEPHONE ENCOUNTER
Reba Price (Key: [id number])    For this age group, has the member had an inadequate response to TWO the following formulary alternatives: Advair HFA, Arnuity Ellipta, Breo Ellipta, Breyna (generic for Symbicort)?*

## 2025-06-09 RX ORDER — FLUTICASONE FUROATE AND VILANTEROL 100; 25 UG/1; UG/1
1 POWDER RESPIRATORY (INHALATION) DAILY
Qty: 60 EACH | Refills: 1 | Status: SHIPPED | OUTPATIENT
Start: 2025-06-09

## 2025-06-13 ENCOUNTER — OFFICE VISIT (OUTPATIENT)
Dept: PULMONOLOGY | Facility: CLINIC | Age: 65
End: 2025-06-13
Payer: MEDICARE

## 2025-06-13 VITALS
HEART RATE: 91 BPM | HEIGHT: 64 IN | BODY MASS INDEX: 34.31 KG/M2 | SYSTOLIC BLOOD PRESSURE: 116 MMHG | WEIGHT: 201 LBS | OXYGEN SATURATION: 98 % | DIASTOLIC BLOOD PRESSURE: 73 MMHG

## 2025-06-13 DIAGNOSIS — J45.30 MILD PERSISTENT ASTHMA WITHOUT COMPLICATION (HCC): ICD-10-CM

## 2025-06-13 DIAGNOSIS — R05.3 CHRONIC COUGH: Primary | ICD-10-CM

## 2025-06-13 PROBLEM — J44.9 CHRONIC OBSTRUCTIVE PULMONARY DISEASE, UNSPECIFIED (HCC): Status: RESOLVED | Noted: 2022-10-04 | Resolved: 2025-06-13

## 2025-06-13 PROBLEM — R05.8: Status: RESOLVED | Noted: 2025-04-01 | Resolved: 2025-06-13

## 2025-06-13 PROBLEM — J45.31 MILD PERSISTENT ASTHMA WITH ACUTE EXACERBATION (HCC): Status: RESOLVED | Noted: 2025-03-18 | Resolved: 2025-06-13

## 2025-06-13 PROBLEM — R06.2 WHEEZING: Status: RESOLVED | Noted: 2025-04-01 | Resolved: 2025-06-13

## 2025-06-13 PROBLEM — R91.1 INCIDENTAL LUNG NODULE, > 3MM AND < 8MM: Status: RESOLVED | Noted: 2021-06-10 | Resolved: 2025-06-13

## 2025-06-13 PROCEDURE — 99203 OFFICE O/P NEW LOW 30 MIN: CPT | Performed by: PHYSICIAN ASSISTANT

## 2025-06-13 NOTE — PATIENT INSTRUCTIONS
Resume Breo 1 puff daily. Rinse mouth, gargle, and spit to follow.  You can use albuterol 2 puffs as needed every 6 hours for shortness of breath, wheezing, and chest tightness.  Once you are finished with Breo, we will plan to switch you to inhaled corticosteroid inhaler as long as you are improved and see how you do on this. Please let me know via phone [phone] or [facility] message in about 3 weeks with how you are doing on Breo.  Continue pantoprazole.  Continue allergy medication.  Recommend pneumonia vaccine (PCV20) which you can get at your pharmacy. Please let us know when you get this done so we can update your medical records.

## 2025-06-13 NOTE — PROGRESS NOTES
Pulmonary Consult Note    History of Present Illness:  Reba Price is a 64 year old female presenting to pulmonary clinic today for chronic cough, referred by Eliza DURAN. She has had an intermittent cough most of her life. Cough is worse and lingers for weeks to months every time she gets sick with upper respiratory illness. She had cold-like symptoms in February. She received dexamethasone, azithromycin, and Breo and symptoms resolved. She had another cold 3 weeks ago and is still coughing although feels 80% better. Her grand kids were sick with same symptoms. Cough is mostly dry but occasionally has clear phlegm. She wakes up with coughing 3-4 nights per week. She is no longer wheezing but does wheeze when she is sick. She has dyspnea only with coughing fits, otherwise no shortness of breath. She denies known history of asthma or lung disease, however, she does recall taking ICS for years in the past. Her brother has asthma. She has nebulizer and albuterol inhaler but does not like to use due to palpitations and worsening tremors. She had PFTs 2025 which showed only air trapping. She does not smoke but does have history of secondhand smoke exposure in childhood. She believes she has seasonal allergies and takes as needed Claritin. She has acid reflux and takes pantoprazole 40 mg daily which controls symptoms for the most part. She has Parkinson's and has noticed some dysphagia which is worse as her posture and stiffness have worsened.    Past Medical History: Parkinson's disease, prediabetes, hypothyroidism, hyperlipidemia, gastroesophageal reflux disease    Past Surgical History: Left knee arthroscopy and meniscus repair, endometrial biopsy, tonsillectomy, adenoidectomy, left breast fibroadenoma excision    Family Medical History: Mother  with dementia, cervical cancer, and hypertension; father  with Parkinson's disease and stroke    Social History: , has 2 kids,  retired school nurse  -Tobacco: Lifelong nonsmoker  -Alcohol: None  -Vaping: None  -Exposure: Secondhand smoke exposure (birth-age 16)  -Pets: None    Allergies: Patient has no known allergies.     Medications: has a current medication list which includes the following prescription(s): fluticasone furoate-vilanterol, amantadine hcl, pantoprazole, albuterol, ipratropium-albuterol, fluticasone furoate-vilanterol, levothyroxine, polyethylene glycol (peg 3350), carbidopa-levodopa, rosuvastatin, magnesium, omega-3 fatty acids, ergocalciferol, metoprolol succinate er, loratadine, and spiriva respimat.    Review of Systems:   Constitutional: No fever or chills. No weight loss or weight gain.  HEENT: +Postnasal drainage. +Hoarse voice. No vision changes.   Cardio: No chest pain or palpitations.  Respiratory: See HPI.  GI: +Acid reflux (controlled on PPI).  Extremities: No lower extremity swelling or pain.   Neurologic: No headache.  Skin: No rash.  Psych: No depression.     Physical Exam:  /73   Pulse 91   Ht 5' 4\" (1.626 m)   Wt 201 lb (91.2 kg)   LMP 12/01/2016   SpO2 98%   BMI 34.50 kg/m²    Constitutional: Obese. No acute distress.  HEENT: Head NC/AT. PEERL. No tonsillar or uvula enlargement.  Cardio: Regular rate and rhythm. Normal S1 and S2. No murmurs.   Respiratory: Thorax symmetrical with no labored breathing. Clear to ausculation bilaterally with symmetrical breath sounds. No wheezing, rhonchi, or crackles.   Extremities: No clubbing or cyanosis. No LE edema. No calf tenderness.  Neurologic: A&Ox3. No gross motor deficits.  Skin: Warm, dry.  Lymphatic: No cervical or supraclavicular lymphadenopathy.  Psych: Calm, cooperative. Pleasant affect.    Results:  -PFTs 3/2025: Normal spirometry. No significant bronchodilator response. Air trapping is present. Normal diffusing capacity.    Assessment/Plan:  Chronic cough  History suggestive of asthma and PFTs with air trapping. She responded well to  dexamethasone and ICS/LABA recently. She is significantly improved at present although cough is not entirely resolved. Wheezing resolved. Also has GERD on PPI, seasonal allergic rhinitis on Claritin, and Parkinson's disease with possible dysphagia, all of which can contribute to chronic cough. Recommend resume ICS/LABA as she has Breo at home. Explained she should remain on maintenance inhaler therapy with ICS.  Plan:  -Resume ICS/LABA (Breo)  -Patient to let me know how she is doing in 3-4 weeks; consider ICS at this time  -Albuterol MDI or nebulizer PRN  -Continue PPI and lifestyle/diet changes for GERD  -Continue Claritin  -Consider further evaluation of dysphagia (speech eval vs VFSS)  -Recommend PCV20 at pharmacy due to insurance  -Annual influenza vaccine  -Follow up in 3 months or sooner if needed    Jose Madison PA-C  Pulmonary Medicine  6/13/2025

## 2025-07-04 ENCOUNTER — PATIENT MESSAGE (OUTPATIENT)
Dept: PULMONOLOGY | Facility: CLINIC | Age: 65
End: 2025-07-04

## 2025-07-07 RX ORDER — FLUTICASONE PROPIONATE AND SALMETEROL XINAFOATE 115; 21 UG/1; UG/1
2 AEROSOL, METERED RESPIRATORY (INHALATION) 2 TIMES DAILY
Qty: 1 EACH | Refills: 2 | Status: SHIPPED | OUTPATIENT
Start: 2025-07-07

## 2025-08-13 ENCOUNTER — TELEPHONE (OUTPATIENT)
Dept: PHYSICAL MEDICINE AND REHAB | Facility: CLINIC | Age: 65
End: 2025-08-13

## 2025-08-13 ENCOUNTER — OFFICE VISIT (OUTPATIENT)
Dept: PHYSICAL MEDICINE AND REHAB | Facility: CLINIC | Age: 65
End: 2025-08-13

## 2025-08-13 VITALS — HEIGHT: 64 IN | WEIGHT: 201 LBS | BODY MASS INDEX: 34.31 KG/M2

## 2025-08-13 DIAGNOSIS — M47.816 LUMBAR FACET ARTHROPATHY: ICD-10-CM

## 2025-08-13 DIAGNOSIS — M47.816 LUMBAR FACET ARTHROPATHY: Primary | ICD-10-CM

## 2025-08-13 DIAGNOSIS — M67.911 TENDINOPATHY OF ROTATOR CUFF, RIGHT: Primary | ICD-10-CM

## 2025-08-13 PROCEDURE — 99214 OFFICE O/P EST MOD 30 MIN: CPT | Performed by: PHYSICAL MEDICINE & REHABILITATION

## 2025-08-18 ENCOUNTER — APPOINTMENT (OUTPATIENT)
Dept: SURGERY | Facility: CLINIC | Age: 65
End: 2025-08-18

## 2025-08-18 PROBLEM — M47.816 LUMBAR FACET ARTHROPATHY: Status: ACTIVE | Noted: 2025-08-18

## 2025-08-19 ENCOUNTER — HOSPITAL ENCOUNTER (OUTPATIENT)
Dept: MAMMOGRAPHY | Age: 65
Discharge: HOME | End: 2025-08-19
Attending: NURSE PRACTITIONER

## 2025-08-19 DIAGNOSIS — Z12.31 ENCOUNTER FOR SCREENING MAMMOGRAM FOR BREAST CANCER: ICD-10-CM

## 2025-08-19 PROCEDURE — 77067 SCR MAMMO BI INCL CAD: CPT | Performed by: NURSE PRACTITIONER

## 2025-08-19 PROCEDURE — 77063 BREAST TOMOSYNTHESIS BI: CPT | Performed by: NURSE PRACTITIONER

## (undated) DIAGNOSIS — M23.204 DEGENERATIVE TEAR OF LEFT MEDIAL MENISCUS: Primary | ICD-10-CM

## (undated) DIAGNOSIS — M94.262 CHONDROMALACIA, LEFT KNEE: ICD-10-CM

## (undated) DEVICE — BLADE SHVR 13CM 4MM EXCLBR

## (undated) DEVICE — ENCORE® LATEX ACCLAIM SIZE 7.5, STERILE LATEX POWDER-FREE SURGICAL GLOVE: Brand: ENCORE

## (undated) DEVICE — AMBIENT SUPER MULTIVAC 50 WITH                                    INTEGRATED FINGER SWITCHES IFS: Brand: COBLATION

## (undated) DEVICE — SUTURE ETHILON 4-0 662G

## (undated) DEVICE — GAMMEX® NON-LATEX PI ORTHO SIZE 7.5, STERILE POLYISOPRENE POWDER-FREE SURGICAL GLOVE: Brand: GAMMEX

## (undated) DEVICE — CHLORAPREP 26ML APPLICATOR

## (undated) DEVICE — SOL  .9 3000ML

## (undated) DEVICE — TUBING IRR 16FT CNT WV 3 ASCP

## (undated) DEVICE — SNARE CAPTIFLEX MICRO-OVL OLY

## (undated) DEVICE — KIT CLEAN ENDOKIT 1.1OZ GOWNX2

## (undated) DEVICE — Device: Brand: DEFENDO AIR/WATER/SUCTION AND BIOPSY VALVE

## (undated) DEVICE — GAMMEX® NON-LATEX PI ORTHO SIZE 8, STERILE POLYISOPRENE POWDER-FREE SURGICAL GLOVE: Brand: GAMMEX

## (undated) DEVICE — 35 ML SYRINGE REGULAR TIP: Brand: MONOJECT

## (undated) DEVICE — Device

## (undated) DEVICE — CONMED SCOPE SAVER BITE BLOCK, 20X27 MM: Brand: SCOPE SAVER

## (undated) DEVICE — FORCEP RADIAL JAW 4

## (undated) DEVICE — SUCTION CANISTER, 3000CC,SAFELINER: Brand: DEROYAL

## (undated) DEVICE — LINE MNTR ADLT SET O2 INTMD

## (undated) DEVICE — Device: Brand: CUSTOM PROCEDURE KIT

## (undated) DEVICE — CLIP LGT 11MM OPEN 2.8MM 235CM

## (undated) DEVICE — MEDI-VAC NON-CONDUCTIVE SUCTION TUBING 6MM X 1.8M (6FT.) L: Brand: CARDINAL HEALTH

## (undated) DEVICE — SNARE OPTMZ PLPCTM TRP

## (undated) DEVICE — ARTHROSCOPY: Brand: MEDLINE INDUSTRIES, INC.

## (undated) DEVICE — MEDI-VAC NON-CONDUCTIVE SUCTION TUBING: Brand: CARDINAL HEALTH

## (undated) DEVICE — DISPOSABLE TOURNIQUET CUFF SINGLE BLADDER, DUAL PORT AND QUICK CONNECT CONNECTOR: Brand: COLOR CUFF

## (undated) NOTE — LETTER
[facility]  Administration of Anesthesia  1. Lizeth Burkett, or _________________________________ acting on her behalf, (Patient) (Dependent/Representative) request to receive anesthesia for my pending procedure/operation/treatment. bleeding, seizure, cardiac arrest and death. 7. AWARENESS: I understand that it is possible (but unlikely) to have explicit memory of events from the operating room while under general anesthesia.   8. ELECTROCONVULSIVE THERAPY PATIENTS: This consent serve below affirms that prior to the time of the procedure, I have explained to the patient and/or his/her guardian, the risks and benefits of undergoing anesthesia, as well as any reasonable alternatives.     ___________________________________________________

## (undated) NOTE — LETTER
12[facility]29/17        [facility]      Dear Marva Ligonier records indicate that you have outstanding lab work and or testing that was ordered for you and has not yet been completed:          CBC W Differential W Platel

## (undated) NOTE — ED AVS SNAPSHOT
[facility] in [address] [facility]  [address]    Phone:  [phone]    Fax:  [phone]   MRN: [id number]    Department:  [facility] in [address] [place] [address]   Date of Visit: under your health insurance plan. Please contact your insurance company and physician's office to determine coverage and benefits available for follow-up care and referrals.      It is our goal to assure that you are completely satisfied with every aspect doctor until you can check with your doctor. Please bring the medication list to your next doctor's appointment. Any imaging studies and labs completed today can be reviewed in your [email or web address] account.   You may have had testing done that requires us to co [address]. MyChart     Visit My eStore App  You can access your MyChart to more actively manage your health care and view more details from this visit by going to [email or web address].   If you've recently had a stay at the [facility]

## (undated) NOTE — LETTER
[address] [place] [address]  Authorization for Surgical Operation or Procedure  Date: ______________       Time: _______________  1.  I hereby authorize Dr. Iza Hood , my physician and the assistant, to perform the following o own blood, or a directed donor transfusion, I will discuss this with my physician.     5. I consent to the photographing of the operations or procedures to be performed for the purposes of advancing medicine, science, and/or education, provided my identity of the procedure, I have explained to the patient and/or her guardian, the risks and benefits involved in the proposed treatment and any reasonable alternative to the proposed treatment.  I have also explained the risks and benefits involved in the refusal

## (undated) NOTE — MR AVS SNAPSHOT
[facility] [place] [address]  [id number] [phone]               Thank you for choosing us for your health care visit with Fabio Chi MD.  We are glad to serve you and happy to provide you with this summary of MyChart     Visit [employer]  You can access your MyChart to more actively manage your health care and view more details from this visit by going to [email or web address].   If you've recently had a stay at the Hospital you can access your discharge ins track your progress   You don’t need to join a gym. Home exercises work great.  Put more priority on exercise in your life                    Visit [facility] online at  [email or web address]

## (undated) NOTE — LETTER
01/04/21        Arjet [facility] Tanisha [address] [facility] [address]      [facility],    15[address] [facility] records indicate that you have outstanding lab work and or testing that was ordered for you and has not yet been completed:  Orders Placed This Encounte

## (undated) NOTE — LETTER
4/4/2022          To Whom It May Concern:    Vianca Mejia is currently under my medical care and may return to work on seated position only, and is not allowed to lift more then 10 lbs for the next 4 weeks. If you require additional information please contact our office.         Sincerely,    Vanessa Osman MD

## (undated) NOTE — LETTER
AUTHORIZATION FOR SURGICAL OPERATION OR OTHER PROCEDURE    1.  I hereby authorize KRYS Mcclure and [address] [place] [address] staff assigned to my case to perform the following operation and/or procedure at the [address] [place] [address]:    __________ ______/______/_____                    Time:  ________ A. M.  P.M.        Patient Name:  ______________________________________________________  (please print)      Patient signature:  ___________________________________________________             Relations

## (undated) NOTE — LETTER
04/16/21        Laila Garay [address] [facility] [address]      Dear Andi Contreras,    15[address] [facility] records indicate that you have outstanding lab work and or testing that was ordered for you and has not yet been completed:  Orders Placed This Encounte

## (undated) NOTE — LETTER
10/29/20        [facility] [address] [place] [address]      Dear Dulce Maria Camilo records indicate that you have outstanding lab work and or testing that was ordered for you and has not yet been completed:  Orders Placed This Encounte

## (undated) NOTE — LETTER
Kari Pelaez Md  [address] [place] [address], [address] [place] [address]       04/02/19        Patient: Aliya Khan   YOB: 1960   Date of Visit: 4/2/2019       Dear  Dr. Danuta Herman MD,      Thank you for referring Aliya Khan to my prac

## (undated) NOTE — LETTER
03/27/20        Hazel Jose  Marcosyinka Rollinsjenny [address] [place] [address] [address] [place] [address]      Dear Rambo Hill,    3224 [facility] records indicate that you have outstanding lab work and or testing that was ordered for you and has not yet been completed:  Orders Placed This Encounte

## (undated) NOTE — LETTER
AUTHORIZATION FOR SURGICAL OPERATION OR OTHER PROCEDURE    1.  I hereby authorize Dr. Nolberto aBnuelos  and [facility] staff assigned to my case to perform the following operation and/or procedure at the [facility]:    Cortisone injection in [place]ef Time:  ________ A. M.  P.M.        Patient Name:  ______________________________________________________  (please print)      Patient signature:  ___________________________________________________             Relationship to Patient:           []  Parent

## (undated) NOTE — LETTER
AUTHORIZATION FOR SURGICAL OPERATION OR OTHER PROCEDURE    1. I hereby authorize Dr. Stewart Pham and the [facility] staff assigned to my case to perform the following operation and/or procedure at the [facility]:  Ultrasound guided right SI joint injection with corticosteroid    _______________________________________________________________________________________________      _______________________________________________________________________________________________    2. My physician has explained the nature and purpose of the operation or other procedure, possible alternative methods of treatment, the risks involved, and the possibility of complication to me. I acknowledge that no guarantee has been made as to the result that may be obtained. 3.  I recognize that, during the course of this operation, or other procedure, unforseen conditions may necessitate additional or different procedure than those listed above. I, therefore, further authorize and request that the above named physician, his/her physician assistants or designees perform such procedures as are, in his/her professional opinion, necessary and desirable. 4.  Any tissue or organs removed in the operation or other procedure may be disposed of by and at the discretion of the [facility] staff and [facility]. 5.  I understand that in the event of a medical emergency, I will be transported by local paramedics to [facility] or other hospital emergency department. 6.  I certify that I have read and fully understand the above consent to operation and/or other procedure. 7.  I acknowledge that my physician has explained sedation/analgesia administration to me including the risks and benefits. I consent to the administration of sedation/analgesia as may be necessary or desirable in the judgement of my physician.     Josefa Burkitt  7/20/1960  [id number]         Patient signature: ___________________________________________________               Statement of Physician  My signature below affirms that prior to the time of the procedure, I have explained to the patient and/or his/her guardian, the risks and benefits involved in the proposed treatment and any reasonable alternative to the proposed treatment. I have also explained the risks and benefits involved in the refusal of the proposed treatment and have answered the patient's questions.                         Date:  ______/______/_______  Provider                      Signature:  __________________________________________________________       Time:  ___________ AGABBY BLAKE

## (undated) NOTE — LETTER
Date: March 15, 2023      Patient Name: Ruperto Chavez      : 1960        Thank you for choosing  [facility] as your health care provider. Your physician has deemed the following medical service(s) necessary. However, your insurance plan may not pay for all of your health care and costs and may deny payment for this service. The fact that your insurance plan does not pay for an item or service does not mean you should not receive it. The purpose of this form is to help you make an informed decision about whether or not you want to receive this service(s) that may not be paid for by your insurance plan. CPT Code Description     Cost     _________ Ultrasound guided right SI joint injection with corticosteroid      _________ ______________________________ _____________      _________ ______________________________ _____________      I understand that the above mentioned service(s) or supply may not be covered by my insurance company.  I agree to be financially responsible for the cost of this service or supply in the event of my insurance denies payment as a non-covered benefit.        ______________________________________________________________________  Signature of Patient or Patient's Representative  Relationship  Date    ______________________________________________________________________  Signature of Witness to signing of form   Printed Name

## (undated) NOTE — MR AVS SNAPSHOT
[address]  [phone]               Thank you for choosing us for your health care visit with Buddy Kim.  Kavon Pham MD.  We are glad to serve you and happy to provide you with this summar Take 1 tablet (5 mg total) by mouth nightly. Commonly known as:  CRESTOR           * Notice: This list has 2 medication(s) that are the same as other medications prescribed for you.  Read the directions carefully, and ask your doctor or other care provid

## (undated) NOTE — LETTER
03/28/18        [address] [facility]      Dear Irina Mckeon records indicate that you have outstanding lab work and or testing that was ordered for you and has not yet been completed:          TSH W Reflex To Free T4 [E]

## (undated) NOTE — LETTER
Patient Name: Spencer Gotti  YOB: 1960          MRN number:  [id number]  Date:  7/20/2023  Referring Physician:  Travis Agosto MD    Progress Summary    Dear Dr. Rivera Hutchins,    Pt has attended 8, cancelled 0, and no shown 0 visits in Physical Therapy. Assessment: Jessica Tarango is making good progress in PT for the instruction of LSVT BIG exercise program. She is independent in the 7 Daily Maximal exercises, able to walk 1/2 mile with improved gait pattern. She reports improvement in movement and balance, decrease in freezing and tremor. She will benefit from completing the program by participating 4 days per week for 2 weeks, 1 hour sessions. Goals:    (to be met in 16 visits)  Increase DGI score to 20/24 to decrease fall risk  Pt will ambulate with normal gait with arm swing, no shuffle  Pt will be able to get on/off the floor without assistance  Pt will be independent in LSVT BIG exercise program  Pt will be able to walk 20 mins without LBP    Rehab Potential: good    Plan: Continue skilled Physical Therapy 4 x/week or a total of 8 visits over a 90 day period. Treatment will include: LSVT BIG exercise program, gait training, manual therapy lumbar spine and L foot and HEP instruction. Patient/Family/Caregiver was advised of these findings, precautions, and treatment options and has agreed to actively participate in planning and for this course of care. Thank you for your referral. Please co-sign or sign and return this letter via fax as soon as possible to [phone]. If you have any questions, please contact me at Dept: [phone]. Sincerely,  Electronically signed by therapist: Kerrie Barros PT  [de-identified] certification required: Yes  I certify the need for these services furnished under this plan of treatment and while under my care.     X___________________________________________________ Date____________________    Certification From: 9/03/1184  To:10/18/2023

## (undated) NOTE — MR AVS SNAPSHOT
[facility] [address]  [address]  [phone]               Thank you for choosing us for your health care visit with Balta Miller MD.  We are glad to serve you and happy to provide you with this summary [place], [address], [address] [facility]  [address]  [address] [place] [address]    [facility] [address]  [address] [place]    It is the patient's responsibility to check Ifrah Arvizu MD   [address] [place] [address]   Phone:  [phone]   Fax:  [phone]    Diagnoses:  Skin lesions   Order:  [facility] - Internal    [facility]   [address]   [address] [facility] [id number]         Referral Orders      Normal O schedule your appointment. Failure to obtain required authorization numbers can create reimbursement difficulties for you. Assoc Dx:   Breast lump on left side at 9 o'clock position [N63]          Reason for Today's Visit     CPX           Medical Issues your doctor or other care provider to review them with you.          Where to Get Your Medications      These medications were sent to [facility] [address], [place] [address]     Phone:  [phone]

## (undated) NOTE — LETTER
[facility] [place] [address]  [address] [facility] [address]  Dept: [phone]  Dept Fax: [phone]: [phone]         March 11, 2020    Patient: Yazmin Sanders   YOB: 1960   Date of Visit: 3/11/2020

## (undated) NOTE — LETTER
04/13/19        [address] [facility]      Dear Solitario July records indicate that you have outstanding lab work and or testing that was ordered for you and has not yet been completed:  Orders Placed This Encounter      C

## (undated) NOTE — LETTER
02/02/21        Walter Garay [address] [facility] [address]      Dear Zuly Ocampo records indicate that you have outstanding lab work and or testing that was ordered for you and has not yet been completed:  Orders Placed This Encounte

## (undated) NOTE — LETTER
08/01/18        915 [facility]      Dear Zuly Ocampo records indicate that you have outstanding lab work and or testing that was ordered for you and has not yet been completed:          TSH W Reflex To Free T4 [E]

## (undated) NOTE — Clinical Note
Ifrah Arvizu Md  [address] [place] [address], [address] [place] [address]       06/20/2017        Patient: Amarilis Manzo   YOB: 1960   Date of Visit: 6/20/2017       Dear  Dr. Nuria Sawyer MD,      Thank you for referring Amarilis Manzo to my p

## (undated) NOTE — LETTER
05/30/18        915 [facility]      Dear Monica Hutchins records indicate that you have outstanding lab work and or testing that was ordered for you and has not yet been completed:          TSH W Reflex To Free T4 [E]